# Patient Record
Sex: MALE | Race: WHITE | NOT HISPANIC OR LATINO
[De-identification: names, ages, dates, MRNs, and addresses within clinical notes are randomized per-mention and may not be internally consistent; named-entity substitution may affect disease eponyms.]

---

## 2017-01-20 ENCOUNTER — APPOINTMENT (OUTPATIENT)
Dept: UROLOGY | Facility: CLINIC | Age: 82
End: 2017-01-20

## 2017-07-21 ENCOUNTER — APPOINTMENT (OUTPATIENT)
Dept: UROLOGY | Facility: CLINIC | Age: 82
End: 2017-07-21

## 2017-07-26 LAB
APPEARANCE: CLEAR
BACTERIA UR CULT: NORMAL
BACTERIA: ABNORMAL
BILIRUBIN URINE: NEGATIVE
BLOOD URINE: ABNORMAL
CALCIUM OXALATE CRYSTALS: ABNORMAL
COLOR: ABNORMAL
GLUCOSE QUALITATIVE U: NORMAL MG/DL
HYALINE CASTS: 0 /LPF
KETONES URINE: NEGATIVE
LEUKOCYTE ESTERASE URINE: ABNORMAL
MICROSCOPIC-UA: NORMAL
NITRITE URINE: NEGATIVE
PH URINE: 5.5
PROTEIN URINE: 30 MG/DL
RED BLOOD CELLS URINE: 25 /HPF
SPECIFIC GRAVITY URINE: 1.02
SQUAMOUS EPITHELIAL CELLS: 2 /HPF
UROBILINOGEN URINE: 1 MG/DL
WHITE BLOOD CELLS URINE: 30 /HPF

## 2018-01-19 ENCOUNTER — APPOINTMENT (OUTPATIENT)
Dept: UROLOGY | Facility: CLINIC | Age: 83
End: 2018-01-19
Payer: MEDICARE

## 2018-01-19 PROCEDURE — 99214 OFFICE O/P EST MOD 30 MIN: CPT

## 2018-01-29 ENCOUNTER — APPOINTMENT (OUTPATIENT)
Dept: UROLOGY | Facility: CLINIC | Age: 83
End: 2018-01-29

## 2018-02-12 ENCOUNTER — APPOINTMENT (OUTPATIENT)
Dept: UROLOGY | Facility: CLINIC | Age: 83
End: 2018-02-12
Payer: MEDICARE

## 2018-02-12 ENCOUNTER — OUTPATIENT (OUTPATIENT)
Dept: OUTPATIENT SERVICES | Facility: HOSPITAL | Age: 83
LOS: 1 days | End: 2018-02-12
Payer: MEDICARE

## 2018-02-12 DIAGNOSIS — K46.9 UNSPECIFIED ABDOMINAL HERNIA WITHOUT OBSTRUCTION OR GANGRENE: Chronic | ICD-10-CM

## 2018-02-12 DIAGNOSIS — Z95.810 PRESENCE OF AUTOMATIC (IMPLANTABLE) CARDIAC DEFIBRILLATOR: Chronic | ICD-10-CM

## 2018-02-12 DIAGNOSIS — R97.20 ELEVATED PROSTATE, SPECIFIC ANTIGEN [PSA]: ICD-10-CM

## 2018-02-12 DIAGNOSIS — R35.0 FREQUENCY OF MICTURITION: ICD-10-CM

## 2018-02-12 DIAGNOSIS — Z98.89 OTHER SPECIFIED POSTPROCEDURAL STATES: Chronic | ICD-10-CM

## 2018-02-12 PROCEDURE — 76872 US TRANSRECTAL: CPT

## 2018-02-12 PROCEDURE — 76872 US TRANSRECTAL: CPT | Mod: 26

## 2018-02-12 PROCEDURE — 99213 OFFICE O/P EST LOW 20 MIN: CPT | Mod: 25

## 2018-02-16 DIAGNOSIS — N40.1 BENIGN PROSTATIC HYPERPLASIA WITH LOWER URINARY TRACT SYMPTOMS: ICD-10-CM

## 2018-02-16 DIAGNOSIS — R97.20 ELEVATED PROSTATE SPECIFIC ANTIGEN [PSA]: ICD-10-CM

## 2019-02-25 ENCOUNTER — APPOINTMENT (OUTPATIENT)
Dept: UROLOGY | Facility: CLINIC | Age: 84
End: 2019-02-25

## 2019-05-28 ENCOUNTER — TRANSCRIPTION ENCOUNTER (OUTPATIENT)
Age: 84
End: 2019-05-28

## 2019-05-31 ENCOUNTER — APPOINTMENT (OUTPATIENT)
Dept: UROLOGY | Facility: CLINIC | Age: 84
End: 2019-05-31
Payer: MEDICARE

## 2019-05-31 VITALS
HEART RATE: 88 BPM | OXYGEN SATURATION: 98 % | TEMPERATURE: 97.7 F | WEIGHT: 144 LBS | HEIGHT: 70 IN | BODY MASS INDEX: 20.62 KG/M2 | SYSTOLIC BLOOD PRESSURE: 125 MMHG | DIASTOLIC BLOOD PRESSURE: 78 MMHG

## 2019-05-31 PROCEDURE — 99213 OFFICE O/P EST LOW 20 MIN: CPT

## 2019-05-31 NOTE — HISTORY OF PRESENT ILLNESS
[FreeTextEntry1] : 83yo gentleman with cc of elevated PSA and hematuria. Pt with hx of TURP x3, last in 2016.  At baseline, he reports some spraying of urinary stream. Nocturia 2x on average. No urinary incontinence. No feelings of incomplete emptying but he has feelings of needing to go soon after.  No longer on any BPH meds. No significant bother. No issues with UTIs. Pt on eliquis for afib. He was previously on coumadin. He has had issues in the past with gross hematuria. He was switched to eliquis to aid in decreasing risk of hematuria. Minimal hematuria since switch to eliquis--none since July 2017. Never any sequelae. Several days ago he developed change in urine color with orange tone. He passed a clot. Has been clear since then. Has been taking eliquis throughout. UA showed only 3-10 RBC. UCx pending but appears negative. \par \par He has noted a slow increase in nocturia. A year ago he was getting up 2-3 times. Now its more (3-4). \par \par He recently went to see PCP and PSA was checked and noted to be elevated at 6.4. Pt remembers it last being 4.9 in 2015. Sisters son with prostate ca dx 62yo and tx with RT. Plan was made for eval with prostate sizing to calculate density and further stratify risk. Prostate today measured as 74cc. This corresponds to a density of 0.09. This is very reassuring that increased PSA is a function of increased prostate volume. \par \par Pt reports that L groin pain is still present. Pain is dull and constant. No radiation. No bulge. Pain is exacerbated by voiding. No clear alleviating factors. No nausea/vomiting. No fevers or chills. No dysuria. No increased urinary frequency or urgency.

## 2019-05-31 NOTE — ASSESSMENT
[FreeTextEntry1] : Gross hematuria (micro on urine) now resolved. Has had work-up multiple times prior. \par --F/u UCx\par --If recurs, CTU and cysto\par \par Nocturia. \par --Trial of flomax

## 2019-12-19 ENCOUNTER — APPOINTMENT (OUTPATIENT)
Dept: PULMONOLOGY | Facility: CLINIC | Age: 84
End: 2019-12-19
Payer: MEDICARE

## 2019-12-19 VITALS
BODY MASS INDEX: 21.92 KG/M2 | OXYGEN SATURATION: 96 % | WEIGHT: 148 LBS | HEART RATE: 88 BPM | RESPIRATION RATE: 16 BRPM | HEIGHT: 69 IN | SYSTOLIC BLOOD PRESSURE: 114 MMHG | DIASTOLIC BLOOD PRESSURE: 72 MMHG

## 2019-12-19 LAB — POCT - HEMOGLOBIN (HGB), QUANTITATIVE, TRANSCUTANEOUS: 10.8

## 2019-12-19 PROCEDURE — 94727 GAS DIL/WSHOT DETER LNG VOL: CPT

## 2019-12-19 PROCEDURE — 94060 EVALUATION OF WHEEZING: CPT

## 2019-12-19 PROCEDURE — 94729 DIFFUSING CAPACITY: CPT

## 2019-12-19 PROCEDURE — 88738 HGB QUANT TRANSCUTANEOUS: CPT

## 2019-12-19 PROCEDURE — 71046 X-RAY EXAM CHEST 2 VIEWS: CPT

## 2019-12-19 PROCEDURE — 99214 OFFICE O/P EST MOD 30 MIN: CPT | Mod: 25

## 2019-12-19 RX ORDER — TAMSULOSIN HYDROCHLORIDE 0.4 MG/1
0.4 CAPSULE ORAL
Qty: 30 | Refills: 11 | Status: DISCONTINUED | COMMUNITY
Start: 2019-05-31 | End: 2019-12-19

## 2019-12-19 NOTE — ASSESSMENT
[FreeTextEntry1] : Patient qualifies for oxygen based on oxygen desaturation with exertion relieved with supplementation.\par \par

## 2019-12-19 NOTE — PHYSICAL EXAM
[General Appearance - Well Developed] : well developed [Normal Appearance] : normal appearance [General Appearance - Well Nourished] : well nourished [Well Groomed] : well groomed [No Deformities] : no deformities [General Appearance - In No Acute Distress] : no acute distress [Normal Conjunctiva] : the conjunctiva exhibited no abnormalities [Eyelids - No Xanthelasma] : the eyelids demonstrated no xanthelasmas [Normal Oropharynx] : normal oropharynx [Low Lying Soft Palate] : low lying soft palate [Elongated Uvula] : elongated uvula [Neck Appearance] : the appearance of the neck was normal [Neck Cervical Mass (___cm)] : no neck mass was observed [Thyroid Diffuse Enlargement] : the thyroid was not enlarged [Thyroid Nodule] : there were no palpable thyroid nodules [FreeTextEntry1] : Reduced aeration bilaterally [Abdomen Soft] : soft [Abdomen Tenderness] : non-tender [Abdomen Mass (___ Cm)] : no abdominal mass palpated [Abnormal Walk] : normal gait [Gait - Sufficient For Exercise Testing] : the gait was sufficient for exercise testing [Nail Clubbing] : no clubbing of the fingernails [Cyanosis, Localized] : no localized cyanosis [Petechial Hemorrhages (___cm)] : no petechial hemorrhages [Skin Color & Pigmentation] : normal skin color and pigmentation [] : no rash [No Venous Stasis] : no venous stasis [Skin Lesions] : no skin lesions [No Skin Ulcers] : no skin ulcer [No Xanthoma] : no  xanthoma was observed [Deep Tendon Reflexes (DTR)] : deep tendon reflexes were 2+ and symmetric [Sensation] : the sensory exam was normal to light touch and pinprick [No Focal Deficits] : no focal deficits [Oriented To Time, Place, And Person] : oriented to person, place, and time [Impaired Insight] : insight and judgment were intact [Affect] : the affect was normal

## 2019-12-19 NOTE — HISTORY OF PRESENT ILLNESS
[FreeTextEntry1] : Followup for CHF\par Valvular heart disease\par COPD\par Uses oxygen at night, does not have portable oxygen\par Some shortness of breath with exertion

## 2019-12-19 NOTE — PROCEDURE
[FreeTextEntry1] : PFTs demonstrate moderate combined obstruction and restriction with interval decline\par Walking oximetry demonstrates oxygen desaturation with exertion to 85%\par With 3 L of nasal oxygen, oxygen saturation was maintained with exertion over 90%

## 2020-10-23 ENCOUNTER — APPOINTMENT (OUTPATIENT)
Dept: UROLOGY | Facility: CLINIC | Age: 85
End: 2020-10-23
Payer: MEDICARE

## 2020-10-23 VITALS
BODY MASS INDEX: 21.62 KG/M2 | WEIGHT: 146 LBS | DIASTOLIC BLOOD PRESSURE: 88 MMHG | SYSTOLIC BLOOD PRESSURE: 148 MMHG | HEART RATE: 92 BPM | HEIGHT: 69 IN

## 2020-10-23 VITALS — TEMPERATURE: 97.9 F

## 2020-10-23 PROCEDURE — 99213 OFFICE O/P EST LOW 20 MIN: CPT

## 2020-10-23 PROCEDURE — 99072 ADDL SUPL MATRL&STAF TM PHE: CPT

## 2020-10-23 RX ORDER — MONTELUKAST SODIUM 10 MG/1
TABLET, FILM COATED ORAL
Refills: 0 | Status: ACTIVE | COMMUNITY

## 2020-10-23 RX ORDER — OMEPRAZOLE 40 MG/1
40 CAPSULE, DELAYED RELEASE ORAL
Qty: 30 | Refills: 0 | Status: DISCONTINUED | COMMUNITY
Start: 2017-11-07 | End: 2020-10-23

## 2020-10-23 NOTE — ASSESSMENT
[FreeTextEntry1] : Gross hematuria in the past. None recently. has known BPH. No recent episode of hematuria. \par --RTC in 1y\par

## 2020-10-23 NOTE — HISTORY OF PRESENT ILLNESS
[FreeTextEntry1] : 84yo gentleman with cc of elevated PSA and hematuria. Pt with hx of TURP x3, last in 2016.  At baseline, he reports some spraying of urinary stream. Nocturia 2x on average. No urinary incontinence. No feelings of incomplete emptying but he has feelings of needing to go soon after.  No longer on any BPH meds. No significant bother. No issues with UTIs. Pt on eliquis for afib. He was previously on coumadin. He has had issues in the past with gross hematuria. He was switched to eliquis to aid in decreasing risk of hematuria. Minimal hematuria since switch to eliquis--none since July 2017. Had one bleeding episode since that change last year. None since last visit. \par \par He has noted a slow increase in nocturia. A year ago he was getting up 2-3 times. Now its more (3-4) but has been this way for the last year. He was trialed on meds with tamsulosin but did not feel it helped. \par \par He recently went to see PCP and PSA was checked and noted to be elevated at 6.4. Pt remembers it last being 4.9 in 2015. Sisters son with prostate ca dx 64yo and tx with RT. Plan was made for eval with prostate sizing to calculate density and further stratify risk. Prostate last measured as 74cc. This corresponds to a density of 0.09. This is very reassuring that increased PSA is a function of increased prostate volume.

## 2020-12-22 ENCOUNTER — APPOINTMENT (OUTPATIENT)
Dept: PULMONOLOGY | Facility: CLINIC | Age: 85
End: 2020-12-22
Payer: MEDICARE

## 2020-12-22 VITALS
TEMPERATURE: 98 F | HEIGHT: 69 IN | BODY MASS INDEX: 22.07 KG/M2 | SYSTOLIC BLOOD PRESSURE: 143 MMHG | DIASTOLIC BLOOD PRESSURE: 78 MMHG | WEIGHT: 149 LBS | OXYGEN SATURATION: 96 % | HEART RATE: 105 BPM

## 2020-12-22 PROCEDURE — 99213 OFFICE O/P EST LOW 20 MIN: CPT

## 2020-12-22 PROCEDURE — 99072 ADDL SUPL MATRL&STAF TM PHE: CPT

## 2020-12-23 NOTE — HISTORY OF PRESENT ILLNESS
[FreeTextEntry1] : Followup for CHF\par Valvular heart disease\par COPD\par Uses oxygen at night, does not have portable oxygen\par

## 2020-12-23 NOTE — PHYSICAL EXAM
[TextBox_11] : baggy eyelids [General Appearance - Well Developed] : well developed [Normal Appearance] : normal appearance [Well Groomed] : well groomed [General Appearance - Well Nourished] : well nourished [No Deformities] : no deformities [General Appearance - In No Acute Distress] : no acute distress [Normal Conjunctiva] : the conjunctiva exhibited no abnormalities [Eyelids - No Xanthelasma] : the eyelids demonstrated no xanthelasmas [Normal Oropharynx] : normal oropharynx [Low Lying Soft Palate] : low lying soft palate [Elongated Uvula] : elongated uvula [Neck Appearance] : the appearance of the neck was normal [Neck Cervical Mass (___cm)] : no neck mass was observed [Thyroid Diffuse Enlargement] : the thyroid was not enlarged [Thyroid Nodule] : there were no palpable thyroid nodules [FreeTextEntry1] : Reduced aeration bilaterally [Abdomen Soft] : soft [Abdomen Tenderness] : non-tender [Abdomen Mass (___ Cm)] : no abdominal mass palpated [Abnormal Walk] : normal gait [Gait - Sufficient For Exercise Testing] : the gait was sufficient for exercise testing [Nail Clubbing] : no clubbing of the fingernails [Cyanosis, Localized] : no localized cyanosis [Petechial Hemorrhages (___cm)] : no petechial hemorrhages [Skin Color & Pigmentation] : normal skin color and pigmentation [] : no rash [No Venous Stasis] : no venous stasis [Skin Lesions] : no skin lesions [No Skin Ulcers] : no skin ulcer [No Xanthoma] : no  xanthoma was observed [Deep Tendon Reflexes (DTR)] : deep tendon reflexes were 2+ and symmetric [Sensation] : the sensory exam was normal to light touch and pinprick [No Focal Deficits] : no focal deficits [Oriented To Time, Place, And Person] : oriented to person, place, and time [Impaired Insight] : insight and judgment were intact [Affect] : the affect was normal

## 2021-01-29 ENCOUNTER — APPOINTMENT (OUTPATIENT)
Dept: PULMONOLOGY | Facility: CLINIC | Age: 86
End: 2021-01-29
Payer: MEDICARE

## 2021-01-29 VITALS
DIASTOLIC BLOOD PRESSURE: 68 MMHG | TEMPERATURE: 98.5 F | SYSTOLIC BLOOD PRESSURE: 108 MMHG | OXYGEN SATURATION: 94 % | HEART RATE: 91 BPM

## 2021-01-29 PROCEDURE — 99072 ADDL SUPL MATRL&STAF TM PHE: CPT

## 2021-01-29 PROCEDURE — 99213 OFFICE O/P EST LOW 20 MIN: CPT | Mod: 25

## 2021-01-29 PROCEDURE — 94618 PULMONARY STRESS TESTING: CPT

## 2021-01-30 NOTE — ASSESSMENT
[FreeTextEntry1] : Patient qualifies with continued oxygen supplementation for diagnosis of congestive heart failure and oxygen desaturation with exertion.\par \par

## 2021-01-30 NOTE — PHYSICAL EXAM
[TextBox_11] : baggy eyelids [General Appearance - Well Developed] : well developed [Normal Appearance] : normal appearance [Well Groomed] : well groomed [General Appearance - Well Nourished] : well nourished [No Deformities] : no deformities [General Appearance - In No Acute Distress] : no acute distress [Normal Conjunctiva] : the conjunctiva exhibited no abnormalities [Eyelids - No Xanthelasma] : the eyelids demonstrated no xanthelasmas [Normal Oropharynx] : normal oropharynx [Low Lying Soft Palate] : low lying soft palate [Elongated Uvula] : elongated uvula [Neck Appearance] : the appearance of the neck was normal [Thyroid Diffuse Enlargement] : the thyroid was not enlarged [Neck Cervical Mass (___cm)] : no neck mass was observed [Thyroid Nodule] : there were no palpable thyroid nodules [FreeTextEntry1] : Reduced aeration bilaterally [Abdomen Soft] : soft [Abdomen Tenderness] : non-tender [Abdomen Mass (___ Cm)] : no abdominal mass palpated [Gait - Sufficient For Exercise Testing] : the gait was sufficient for exercise testing [Abnormal Walk] : normal gait [Nail Clubbing] : no clubbing of the fingernails [Cyanosis, Localized] : no localized cyanosis [Petechial Hemorrhages (___cm)] : no petechial hemorrhages [] : no rash [Skin Color & Pigmentation] : normal skin color and pigmentation [No Venous Stasis] : no venous stasis [Skin Lesions] : no skin lesions [No Skin Ulcers] : no skin ulcer [No Xanthoma] : no  xanthoma was observed [Deep Tendon Reflexes (DTR)] : deep tendon reflexes were 2+ and symmetric [Sensation] : the sensory exam was normal to light touch and pinprick [No Focal Deficits] : no focal deficits [Oriented To Time, Place, And Person] : oriented to person, place, and time [Impaired Insight] : insight and judgment were intact [Affect] : the affect was normal

## 2021-01-30 NOTE — HISTORY OF PRESENT ILLNESS
[FreeTextEntry1] : Followup for CHF\par Valvular heart disease\par COPD\par Uses oxygen at night\par He reports that he has a portable oxygen system although at the last visit he said that he did not and he needs 1.  We had attempted to do overnight oximetry but was an unsuccessful study.\par

## 2021-01-30 NOTE — PROCEDURE
[FreeTextEntry1] : Walking pulse oximetry:.  Baseline saturation 93% decreased to 87% with 6 minutes of exertion.\par \par Walking oximetry with supplemental oxygen baseline 93% oxygen saturation maintained above 90% with exertion for 6 minutes.

## 2021-07-16 ENCOUNTER — APPOINTMENT (OUTPATIENT)
Dept: PULMONOLOGY | Facility: CLINIC | Age: 86
End: 2021-07-16
Payer: MEDICARE

## 2021-07-16 VITALS
DIASTOLIC BLOOD PRESSURE: 73 MMHG | SYSTOLIC BLOOD PRESSURE: 112 MMHG | OXYGEN SATURATION: 95 % | TEMPERATURE: 98.2 F | HEART RATE: 84 BPM

## 2021-07-16 PROCEDURE — 94010 BREATHING CAPACITY TEST: CPT

## 2021-07-16 PROCEDURE — 99213 OFFICE O/P EST LOW 20 MIN: CPT | Mod: 25

## 2021-07-16 RX ORDER — GLYCOPYRROLATE 1 MG/1
1 TABLET ORAL
Refills: 0 | Status: DISCONTINUED | COMMUNITY
End: 2021-07-16

## 2021-07-16 RX ORDER — OMEPRAZOLE 20 MG/1
20 TABLET, DELAYED RELEASE ORAL
Refills: 0 | Status: ACTIVE | COMMUNITY

## 2021-07-16 RX ORDER — APIXABAN 5 MG/1
5 TABLET, FILM COATED ORAL
Refills: 0 | Status: ACTIVE | COMMUNITY

## 2021-07-16 RX ORDER — LORATADINE 10 MG/1
10 CAPSULE, LIQUID FILLED ORAL
Refills: 0 | Status: ACTIVE | COMMUNITY

## 2021-07-16 RX ORDER — GLYCOPYRROLATE 1 MG/1
1 TABLET ORAL 3 TIMES DAILY
Qty: 90 | Refills: 5 | Status: DISCONTINUED | COMMUNITY
Start: 2021-03-22 | End: 2021-07-16

## 2021-07-17 NOTE — ASSESSMENT
[FreeTextEntry1] : Overall stable\par \par Trial of inhaler-Trelegy\par Proper use and technique for inhaler demonstrated and explained.\par \par \par \par

## 2021-07-17 NOTE — HISTORY OF PRESENT ILLNESS
[FreeTextEntry1] : Followup for CHF\par Valvular heart disease\par COPD\par Uses oxygen at night\par chronic dyspnea\par

## 2021-11-11 ENCOUNTER — APPOINTMENT (OUTPATIENT)
Dept: UROLOGY | Facility: CLINIC | Age: 86
End: 2021-11-11

## 2021-12-17 ENCOUNTER — APPOINTMENT (OUTPATIENT)
Dept: UROLOGY | Facility: CLINIC | Age: 86
End: 2021-12-17
Payer: MEDICARE

## 2021-12-17 DIAGNOSIS — R35.1 NOCTURIA: ICD-10-CM

## 2021-12-17 PROCEDURE — 99214 OFFICE O/P EST MOD 30 MIN: CPT

## 2021-12-20 LAB
APPEARANCE: CLEAR
BACTERIA UR CULT: NORMAL
BACTERIA: NEGATIVE
BILIRUBIN URINE: NEGATIVE
BLOOD URINE: NEGATIVE
COLOR: YELLOW
GLUCOSE QUALITATIVE U: NEGATIVE
HYALINE CASTS: 0 /LPF
KETONES URINE: NEGATIVE
LEUKOCYTE ESTERASE URINE: NEGATIVE
MICROSCOPIC-UA: NORMAL
NITRITE URINE: NEGATIVE
PH URINE: 6.5
PROTEIN URINE: ABNORMAL
RED BLOOD CELLS URINE: 8 /HPF
SPECIFIC GRAVITY URINE: 1.01
SQUAMOUS EPITHELIAL CELLS: 0 /HPF
UROBILINOGEN URINE: NORMAL
WHITE BLOOD CELLS URINE: 1 /HPF

## 2021-12-20 NOTE — PHYSICAL EXAM
[General Appearance - Well Developed] : well developed [General Appearance - Well Nourished] : well nourished [Normal Appearance] : normal appearance [Well Groomed] : well groomed [General Appearance - In No Acute Distress] : no acute distress [Abdomen Soft] : soft [Abdomen Tenderness] : non-tender [Edema] : no peripheral edema [] : no respiratory distress [Respiration, Rhythm And Depth] : normal respiratory rhythm and effort [Exaggerated Use Of Accessory Muscles For Inspiration] : no accessory muscle use [Oriented To Time, Place, And Person] : oriented to person, place, and time [Affect] : the affect was normal [Mood] : the mood was normal [Not Anxious] : not anxious [No Focal Deficits] : no focal deficits [Urinary Bladder Findings] : the bladder was normal on palpation

## 2021-12-20 NOTE — HISTORY OF PRESENT ILLNESS
[FreeTextEntry1] : 87 yo gentleman with cc of elevated PSA and hematuria. Pt with hx of TURP x3, last in 2016. At baseline, he reports some spraying of urinary stream. Nocturia 2x on average. No urinary incontinence. No feelings of incomplete emptying but he has feelings of needing to go soon after. No longer on any BPH meds. No significant bother. No issues with UTIs. Pt on eliquis for afib. He was previously on coumadin. He has had issues in the past with gross hematuria. He was switched to eliquis to aid in decreasing risk of hematuria. Minimal hematuria since switch to eliquis--none since July 2017. Had one bleeding episode since that change last year. None since last visit. \par \par He has noted a slow increase in nocturia. A year ago he was getting up 2-3 times. Now its more (3-4) but has been this way for the last year. He was trialed on meds with tamsulosin but did not feel it helped. \par \par In 2019 pt went to see PCP and PSA was checked and noted to be elevated at 6.4. Pt remembers it last being 4.9 in 2015. Sisters son with prostate ca dx 62yo and tx with RT. Plan was made for eval with prostate sizing to calculate density and further stratify risk. Prostate last measured as 74cc. This corresponds to a density of 0.09. This is very reassuring that increased PSA is a function of increased prostate volume. \par \par Today (12/17/2021), patient returns to clinic for his yearly follow up. He states that his symptoms of urinary frequency have remained the same and that he goes about once every hour. He wakes up 3-4x a night which is unchanged from last year. Mr. Morse complains that his stream is weak at times but that it is not bothersome to him. He strains for about 5-10 seconds before urination starts. In October, he had one episode of hematuria (red urine with no clots per patient) and dropped off a urine sample the day after. Urine culture was negative at the time but U/A was not performed. He has had hematuria in the past, but the last episode was many years ago. He has tried tamsulosin in the past and states that it did not help him. He is amenable to trying something else to help his symptoms. \par \par PVR was 15 cc. \par

## 2021-12-20 NOTE — ASSESSMENT
[FreeTextEntry1] : BPH s/p TURP x3 (with frequency, nocturia, straining and weak stream)\par - U/A, Urine Culture\par - Trial of alfuzosin  --> discussed risks and benefits \par - Return to clinic in 1 month to discuss if alfuzosin helps improve symptoms\par \par Gross Hematuria\par - U/A, Urine Culture, and Urine Cytology\par - Due to fact that patient has not had an episode of gross hematuria in many years, join decision making was utilized to decide to work up. \par - Recommend CTU but pt reports high cost. Agreed to renal U/S eval instead. disucssed that this is less sensitive\par - Cystoscopy in 1 month

## 2021-12-22 LAB — URINE CYTOLOGY: NORMAL

## 2022-01-01 ENCOUNTER — APPOINTMENT (OUTPATIENT)
Dept: GASTROENTEROLOGY | Facility: CLINIC | Age: 87
End: 2022-01-01

## 2022-01-01 ENCOUNTER — APPOINTMENT (OUTPATIENT)
Dept: UROLOGY | Facility: CLINIC | Age: 87
End: 2022-01-01

## 2022-01-01 ENCOUNTER — NON-APPOINTMENT (OUTPATIENT)
Age: 87
End: 2022-01-01

## 2022-01-01 ENCOUNTER — APPOINTMENT (OUTPATIENT)
Dept: PULMONOLOGY | Facility: CLINIC | Age: 87
End: 2022-01-01

## 2022-01-01 ENCOUNTER — APPOINTMENT (OUTPATIENT)
Dept: UROLOGY | Facility: CLINIC | Age: 87
End: 2022-01-01
Payer: MEDICARE

## 2022-01-01 ENCOUNTER — TRANSCRIPTION ENCOUNTER (OUTPATIENT)
Age: 87
End: 2022-01-01

## 2022-01-01 ENCOUNTER — APPOINTMENT (OUTPATIENT)
Dept: PULMONOLOGY | Facility: CLINIC | Age: 87
End: 2022-01-01
Payer: MEDICARE

## 2022-01-01 ENCOUNTER — INPATIENT (INPATIENT)
Facility: HOSPITAL | Age: 87
LOS: 4 days | Discharge: NURSING, REHAB OR LONG-TERM CARE | End: 2022-12-22
Attending: INTERNAL MEDICINE | Admitting: INTERNAL MEDICINE
Payer: MEDICARE

## 2022-01-01 ENCOUNTER — INPATIENT (INPATIENT)
Facility: HOSPITAL | Age: 87
LOS: 5 days | Discharge: HOME | End: 2022-12-08
Attending: INTERNAL MEDICINE | Admitting: INTERNAL MEDICINE

## 2022-01-01 VITALS
SYSTOLIC BLOOD PRESSURE: 103 MMHG | OXYGEN SATURATION: 92 % | HEIGHT: 69 IN | WEIGHT: 138 LBS | BODY MASS INDEX: 20.44 KG/M2 | HEART RATE: 88 BPM | TEMPERATURE: 98 F | DIASTOLIC BLOOD PRESSURE: 58 MMHG

## 2022-01-01 VITALS
HEIGHT: 69 IN | HEART RATE: 91 BPM | DIASTOLIC BLOOD PRESSURE: 60 MMHG | WEIGHT: 135.13 LBS | TEMPERATURE: 97.3 F | SYSTOLIC BLOOD PRESSURE: 110 MMHG | OXYGEN SATURATION: 99 % | BODY MASS INDEX: 20.01 KG/M2

## 2022-01-01 VITALS
OXYGEN SATURATION: 91 % | DIASTOLIC BLOOD PRESSURE: 57 MMHG | TEMPERATURE: 99 F | HEART RATE: 81 BPM | RESPIRATION RATE: 18 BRPM | SYSTOLIC BLOOD PRESSURE: 106 MMHG

## 2022-01-01 VITALS
TEMPERATURE: 103 F | DIASTOLIC BLOOD PRESSURE: 78 MMHG | RESPIRATION RATE: 22 BRPM | HEIGHT: 69 IN | HEART RATE: 88 BPM | WEIGHT: 199.96 LBS | OXYGEN SATURATION: 100 % | SYSTOLIC BLOOD PRESSURE: 127 MMHG

## 2022-01-01 VITALS
SYSTOLIC BLOOD PRESSURE: 110 MMHG | TEMPERATURE: 98 F | HEART RATE: 82 BPM | DIASTOLIC BLOOD PRESSURE: 58 MMHG | OXYGEN SATURATION: 99 % | RESPIRATION RATE: 18 BRPM

## 2022-01-01 VITALS
OXYGEN SATURATION: 98 % | TEMPERATURE: 97 F | HEART RATE: 80 BPM | DIASTOLIC BLOOD PRESSURE: 61 MMHG | RESPIRATION RATE: 18 BRPM | SYSTOLIC BLOOD PRESSURE: 99 MMHG

## 2022-01-01 VITALS
OXYGEN SATURATION: 94 % | HEIGHT: 69 IN | WEIGHT: 146 LBS | DIASTOLIC BLOOD PRESSURE: 62 MMHG | BODY MASS INDEX: 21.62 KG/M2 | SYSTOLIC BLOOD PRESSURE: 100 MMHG | HEART RATE: 88 BPM | TEMPERATURE: 98.3 F

## 2022-01-01 VITALS
SYSTOLIC BLOOD PRESSURE: 110 MMHG | TEMPERATURE: 97.2 F | BODY MASS INDEX: 20.44 KG/M2 | WEIGHT: 138 LBS | HEIGHT: 69 IN | DIASTOLIC BLOOD PRESSURE: 60 MMHG

## 2022-01-01 VITALS
HEART RATE: 81 BPM | OXYGEN SATURATION: 100 % | TEMPERATURE: 97.4 F | SYSTOLIC BLOOD PRESSURE: 108 MMHG | DIASTOLIC BLOOD PRESSURE: 72 MMHG

## 2022-01-01 DIAGNOSIS — E43 UNSPECIFIED SEVERE PROTEIN-CALORIE MALNUTRITION: ICD-10-CM

## 2022-01-01 DIAGNOSIS — I47.20 VENTRICULAR TACHYCARDIA, UNSPECIFIED: ICD-10-CM

## 2022-01-01 DIAGNOSIS — Z90.79 ACQUIRED ABSENCE OF OTHER GENITAL ORGAN(S): ICD-10-CM

## 2022-01-01 DIAGNOSIS — Z87.891 PERSONAL HISTORY OF NICOTINE DEPENDENCE: ICD-10-CM

## 2022-01-01 DIAGNOSIS — Z95.810 PRESENCE OF AUTOMATIC (IMPLANTABLE) CARDIAC DEFIBRILLATOR: ICD-10-CM

## 2022-01-01 DIAGNOSIS — K46.9 UNSPECIFIED ABDOMINAL HERNIA WITHOUT OBSTRUCTION OR GANGRENE: Chronic | ICD-10-CM

## 2022-01-01 DIAGNOSIS — I27.20 PULMONARY HYPERTENSION, UNSPECIFIED: ICD-10-CM

## 2022-01-01 DIAGNOSIS — Z98.89 OTHER SPECIFIED POSTPROCEDURAL STATES: Chronic | ICD-10-CM

## 2022-01-01 DIAGNOSIS — N39.0 URINARY TRACT INFECTION, SITE NOT SPECIFIED: ICD-10-CM

## 2022-01-01 DIAGNOSIS — Z95.810 PRESENCE OF AUTOMATIC (IMPLANTABLE) CARDIAC DEFIBRILLATOR: Chronic | ICD-10-CM

## 2022-01-01 DIAGNOSIS — E78.5 HYPERLIPIDEMIA, UNSPECIFIED: ICD-10-CM

## 2022-01-01 DIAGNOSIS — D64.9 ANEMIA, UNSPECIFIED: ICD-10-CM

## 2022-01-01 DIAGNOSIS — K21.9 GASTRO-ESOPHAGEAL REFLUX DISEASE WITHOUT ESOPHAGITIS: ICD-10-CM

## 2022-01-01 DIAGNOSIS — Z87.438 PERSONAL HISTORY OF OTHER DISEASES OF MALE GENITAL ORGANS: ICD-10-CM

## 2022-01-01 DIAGNOSIS — J96.01 ACUTE RESPIRATORY FAILURE WITH HYPOXIA: ICD-10-CM

## 2022-01-01 DIAGNOSIS — N40.0 BENIGN PROSTATIC HYPERPLASIA WITHOUT LOWER URINARY TRACT SYMPTOMS: ICD-10-CM

## 2022-01-01 DIAGNOSIS — Z82.49 FAMILY HISTORY OF ISCHEMIC HEART DISEASE AND OTHER DISEASES OF THE CIRCULATORY SYSTEM: ICD-10-CM

## 2022-01-01 DIAGNOSIS — Z86.16 PERSONAL HISTORY OF COVID-19: ICD-10-CM

## 2022-01-01 DIAGNOSIS — K63.5 POLYP OF COLON: ICD-10-CM

## 2022-01-01 DIAGNOSIS — R63.8 OTHER SYMPTOMS AND SIGNS CONCERNING FOOD AND FLUID INTAKE: ICD-10-CM

## 2022-01-01 DIAGNOSIS — R10.32 LEFT LOWER QUADRANT PAIN: ICD-10-CM

## 2022-01-01 DIAGNOSIS — I42.9 CARDIOMYOPATHY, UNSPECIFIED: ICD-10-CM

## 2022-01-01 DIAGNOSIS — R31.29 OTHER MICROSCOPIC HEMATURIA: ICD-10-CM

## 2022-01-01 DIAGNOSIS — I50.23 ACUTE ON CHRONIC SYSTOLIC (CONGESTIVE) HEART FAILURE: ICD-10-CM

## 2022-01-01 DIAGNOSIS — R33.9 RETENTION OF URINE, UNSPECIFIED: ICD-10-CM

## 2022-01-01 DIAGNOSIS — G47.33 OBSTRUCTIVE SLEEP APNEA (ADULT) (PEDIATRIC): ICD-10-CM

## 2022-01-01 DIAGNOSIS — I35.1 NONRHEUMATIC AORTIC (VALVE) INSUFFICIENCY: ICD-10-CM

## 2022-01-01 DIAGNOSIS — Z91.018 ALLERGY TO OTHER FOODS: ICD-10-CM

## 2022-01-01 DIAGNOSIS — I50.9 HEART FAILURE, UNSPECIFIED: ICD-10-CM

## 2022-01-01 DIAGNOSIS — I34.0 NONRHEUMATIC MITRAL (VALVE) INSUFFICIENCY: ICD-10-CM

## 2022-01-01 DIAGNOSIS — I71.40 ABDOMINAL AORTIC ANEURYSM, WITHOUT RUPTURE, UNSPECIFIED: ICD-10-CM

## 2022-01-01 DIAGNOSIS — K58.9 IRRITABLE BOWEL SYNDROME W/OUT DIARRHEA: ICD-10-CM

## 2022-01-01 DIAGNOSIS — I11.0 HYPERTENSIVE HEART DISEASE WITH HEART FAILURE: ICD-10-CM

## 2022-01-01 DIAGNOSIS — Z88.5 ALLERGY STATUS TO NARCOTIC AGENT: ICD-10-CM

## 2022-01-01 DIAGNOSIS — J44.9 CHRONIC OBSTRUCTIVE PULMONARY DISEASE, UNSPECIFIED: ICD-10-CM

## 2022-01-01 DIAGNOSIS — A49.1 STREPTOCOCCAL INFECTION, UNSPECIFIED SITE: ICD-10-CM

## 2022-01-01 DIAGNOSIS — N17.9 ACUTE KIDNEY FAILURE, UNSPECIFIED: ICD-10-CM

## 2022-01-01 DIAGNOSIS — Z98.890 OTHER SPECIFIED POSTPROCEDURAL STATES: ICD-10-CM

## 2022-01-01 DIAGNOSIS — U07.1 COVID-19: ICD-10-CM

## 2022-01-01 DIAGNOSIS — Z79.899 OTHER LONG TERM (CURRENT) DRUG THERAPY: ICD-10-CM

## 2022-01-01 DIAGNOSIS — R31.0 GROSS HEMATURIA: ICD-10-CM

## 2022-01-01 DIAGNOSIS — I48.20 CHRONIC ATRIAL FIBRILLATION, UNSPECIFIED: ICD-10-CM

## 2022-01-01 DIAGNOSIS — K58.9 IRRITABLE BOWEL SYNDROME WITHOUT DIARRHEA: ICD-10-CM

## 2022-01-01 DIAGNOSIS — N40.1 BENIGN PROSTATIC HYPERPLASIA WITH LOWER URINARY TRACT SYMPMS: ICD-10-CM

## 2022-01-01 DIAGNOSIS — Z86.79 PERSONAL HISTORY OF OTHER DISEASES OF THE CIRCULATORY SYSTEM: ICD-10-CM

## 2022-01-01 DIAGNOSIS — A40.9 STREPTOCOCCAL SEPSIS, UNSPECIFIED: ICD-10-CM

## 2022-01-01 DIAGNOSIS — A40.8 OTHER STREPTOCOCCAL SEPSIS: ICD-10-CM

## 2022-01-01 DIAGNOSIS — I50.22 CHRONIC SYSTOLIC (CONGESTIVE) HEART FAILURE: ICD-10-CM

## 2022-01-01 DIAGNOSIS — Z79.01 LONG TERM (CURRENT) USE OF ANTICOAGULANTS: ICD-10-CM

## 2022-01-01 DIAGNOSIS — Z20.822 CONTACT WITH AND (SUSPECTED) EXPOSURE TO COVID-19: ICD-10-CM

## 2022-01-01 DIAGNOSIS — I25.10 ATHEROSCLEROTIC HEART DISEASE OF NATIVE CORONARY ARTERY WITHOUT ANGINA PECTORIS: ICD-10-CM

## 2022-01-01 DIAGNOSIS — R06.02 SHORTNESS OF BREATH: ICD-10-CM

## 2022-01-01 DIAGNOSIS — R65.20 SEVERE SEPSIS WITHOUT SEPTIC SHOCK: ICD-10-CM

## 2022-01-01 LAB
-  AMIKACIN: SIGNIFICANT CHANGE UP
-  AMOXICILLIN/CLAVULANIC ACID: SIGNIFICANT CHANGE UP
-  AMPICILLIN/SULBACTAM: SIGNIFICANT CHANGE UP
-  AMPICILLIN: SIGNIFICANT CHANGE UP
-  AZTREONAM: SIGNIFICANT CHANGE UP
-  CEFAZOLIN: SIGNIFICANT CHANGE UP
-  CEFEPIME: SIGNIFICANT CHANGE UP
-  CEFOXITIN: SIGNIFICANT CHANGE UP
-  CEFTRIAXONE: SIGNIFICANT CHANGE UP
-  CEFTRIAXONE: SIGNIFICANT CHANGE UP
-  CIPROFLOXACIN: SIGNIFICANT CHANGE UP
-  CLINDAMYCIN: SIGNIFICANT CHANGE UP
-  ERTAPENEM: SIGNIFICANT CHANGE UP
-  ERYTHROMYCIN: SIGNIFICANT CHANGE UP
-  GENTAMICIN: SIGNIFICANT CHANGE UP
-  IMIPENEM: SIGNIFICANT CHANGE UP
-  LEVOFLOXACIN: SIGNIFICANT CHANGE UP
-  LEVOFLOXACIN: SIGNIFICANT CHANGE UP
-  MEROPENEM: SIGNIFICANT CHANGE UP
-  NITROFURANTOIN: SIGNIFICANT CHANGE UP
-  PENICILLIN: SIGNIFICANT CHANGE UP
-  PIPERACILLIN/TAZOBACTAM: SIGNIFICANT CHANGE UP
-  STREPTOCOCCUS SP. (NOT GRP A, B OR S PNEUMONIAE): SIGNIFICANT CHANGE UP
-  TOBRAMYCIN: SIGNIFICANT CHANGE UP
-  TRIMETHOPRIM/SULFAMETHOXAZOLE: SIGNIFICANT CHANGE UP
-  VANCOMYCIN: SIGNIFICANT CHANGE UP
24R-OH-CALCIDIOL SERPL-MCNC: 66.7 NG/ML — SIGNIFICANT CHANGE UP (ref 30–80)
24R-OH-CALCIDIOL SERPL-MCNC: 70.2 NG/ML — SIGNIFICANT CHANGE UP (ref 30–80)
ALBUMIN SERPL ELPH-MCNC: 2.9 G/DL — LOW (ref 3.3–5)
ALBUMIN SERPL ELPH-MCNC: 2.9 G/DL — LOW (ref 3.3–5)
ALBUMIN SERPL ELPH-MCNC: 3 G/DL — LOW (ref 3.3–5)
ALBUMIN SERPL ELPH-MCNC: 3 G/DL — LOW (ref 3.3–5)
ALBUMIN SERPL ELPH-MCNC: 3.3 G/DL — SIGNIFICANT CHANGE UP (ref 3.3–5)
ALBUMIN SERPL ELPH-MCNC: 3.3 G/DL — SIGNIFICANT CHANGE UP (ref 3.3–5)
ALBUMIN SERPL ELPH-MCNC: 3.6 G/DL — SIGNIFICANT CHANGE UP (ref 3.3–5)
ALBUMIN SERPL ELPH-MCNC: 4 G/DL — SIGNIFICANT CHANGE UP (ref 3.3–5)
ALBUMIN SERPL ELPH-MCNC: 4.2 G/DL
ALBUMIN SERPL ELPH-MCNC: 4.2 G/DL — SIGNIFICANT CHANGE UP (ref 3.3–5)
ALP BLD-CCNC: 164 U/L
ALP SERPL-CCNC: 148 U/L — HIGH (ref 40–120)
ALP SERPL-CCNC: 154 U/L — HIGH (ref 40–120)
ALP SERPL-CCNC: 156 U/L — HIGH (ref 40–120)
ALP SERPL-CCNC: 162 U/L — HIGH (ref 40–120)
ALP SERPL-CCNC: 164 U/L — HIGH (ref 40–120)
ALP SERPL-CCNC: 168 U/L — HIGH (ref 40–120)
ALP SERPL-CCNC: 177 U/L — HIGH (ref 40–120)
ALP SERPL-CCNC: 181 U/L — HIGH (ref 40–120)
ALP SERPL-CCNC: 183 U/L — HIGH (ref 40–120)
ALT FLD-CCNC: 13 U/L — SIGNIFICANT CHANGE UP (ref 4–41)
ALT FLD-CCNC: 14 U/L — SIGNIFICANT CHANGE UP (ref 4–41)
ALT FLD-CCNC: 15 U/L — SIGNIFICANT CHANGE UP (ref 4–41)
ALT FLD-CCNC: 17 U/L — SIGNIFICANT CHANGE UP (ref 4–41)
ALT FLD-CCNC: 18 U/L — SIGNIFICANT CHANGE UP (ref 12–78)
ALT FLD-CCNC: 19 U/L — SIGNIFICANT CHANGE UP (ref 12–78)
ALT FLD-CCNC: 19 U/L — SIGNIFICANT CHANGE UP (ref 12–78)
ALT FLD-CCNC: 21 U/L — SIGNIFICANT CHANGE UP (ref 12–78)
ALT FLD-CCNC: 22 U/L — SIGNIFICANT CHANGE UP (ref 12–78)
ALT SERPL-CCNC: 23 U/L
AMMONIA BLD-MCNC: 30 UMOL/L — SIGNIFICANT CHANGE UP (ref 11–32)
ANION GAP SERPL CALC-SCNC: 10 MMOL/L — SIGNIFICANT CHANGE UP (ref 7–14)
ANION GAP SERPL CALC-SCNC: 10 MMOL/L — SIGNIFICANT CHANGE UP (ref 7–14)
ANION GAP SERPL CALC-SCNC: 12 MMOL/L
ANION GAP SERPL CALC-SCNC: 12 MMOL/L — SIGNIFICANT CHANGE UP (ref 7–14)
ANION GAP SERPL CALC-SCNC: 2 MMOL/L — LOW (ref 5–17)
ANION GAP SERPL CALC-SCNC: 2 MMOL/L — LOW (ref 5–17)
ANION GAP SERPL CALC-SCNC: 3 MMOL/L — LOW (ref 5–17)
ANION GAP SERPL CALC-SCNC: 3 MMOL/L — LOW (ref 5–17)
ANION GAP SERPL CALC-SCNC: 4 MMOL/L — LOW (ref 5–17)
ANION GAP SERPL CALC-SCNC: 7 MMOL/L — SIGNIFICANT CHANGE UP (ref 5–17)
ANION GAP SERPL CALC-SCNC: 7 MMOL/L — SIGNIFICANT CHANGE UP (ref 7–14)
ANION GAP SERPL CALC-SCNC: 7 MMOL/L — SIGNIFICANT CHANGE UP (ref 7–14)
ANION GAP SERPL CALC-SCNC: 9 MMOL/L — SIGNIFICANT CHANGE UP (ref 7–14)
ANION GAP SERPL CALC-SCNC: 9 MMOL/L — SIGNIFICANT CHANGE UP (ref 7–14)
APPEARANCE UR: ABNORMAL
APTT BLD: 42.5 SEC — HIGH (ref 27–36.3)
AST SERPL-CCNC: 20 U/L — SIGNIFICANT CHANGE UP (ref 15–37)
AST SERPL-CCNC: 20 U/L — SIGNIFICANT CHANGE UP (ref 4–40)
AST SERPL-CCNC: 22 U/L — SIGNIFICANT CHANGE UP (ref 15–37)
AST SERPL-CCNC: 23 U/L — SIGNIFICANT CHANGE UP (ref 4–40)
AST SERPL-CCNC: 28 U/L — SIGNIFICANT CHANGE UP (ref 15–37)
AST SERPL-CCNC: 29 U/L — SIGNIFICANT CHANGE UP (ref 4–40)
AST SERPL-CCNC: 30 U/L — SIGNIFICANT CHANGE UP (ref 4–40)
AST SERPL-CCNC: 31 U/L
AST SERPL-CCNC: 32 U/L — SIGNIFICANT CHANGE UP (ref 15–37)
AST SERPL-CCNC: 38 U/L — HIGH (ref 15–37)
BACTERIA # UR AUTO: ABNORMAL
BASE EXCESS BLDA CALC-SCNC: 12.9 MMOL/L — HIGH (ref -2–3)
BASOPHILS # BLD AUTO: 0.02 K/UL — SIGNIFICANT CHANGE UP (ref 0–0.2)
BASOPHILS # BLD AUTO: 0.04 K/UL — SIGNIFICANT CHANGE UP (ref 0–0.2)
BASOPHILS # BLD AUTO: 0.04 K/UL — SIGNIFICANT CHANGE UP (ref 0–0.2)
BASOPHILS # BLD AUTO: 0.05 K/UL — SIGNIFICANT CHANGE UP (ref 0–0.2)
BASOPHILS # BLD AUTO: 0.06 K/UL
BASOPHILS NFR BLD AUTO: 0.2 % — SIGNIFICANT CHANGE UP (ref 0–2)
BASOPHILS NFR BLD AUTO: 0.9 % — SIGNIFICANT CHANGE UP (ref 0–2)
BASOPHILS NFR BLD AUTO: 1 % — SIGNIFICANT CHANGE UP (ref 0–2)
BASOPHILS NFR BLD AUTO: 1.2 % — SIGNIFICANT CHANGE UP (ref 0–2)
BASOPHILS NFR BLD AUTO: 1.6 %
BILIRUB DIRECT SERPL-MCNC: 0.3 MG/DL — SIGNIFICANT CHANGE UP (ref 0–0.3)
BILIRUB DIRECT SERPL-MCNC: 0.8 MG/DL — HIGH (ref 0–0.3)
BILIRUB INDIRECT FLD-MCNC: 0.3 MG/DL — SIGNIFICANT CHANGE UP (ref 0–1)
BILIRUB INDIRECT FLD-MCNC: 0.7 MG/DL — SIGNIFICANT CHANGE UP (ref 0.2–1)
BILIRUB SERPL-MCNC: 0.6 MG/DL — SIGNIFICANT CHANGE UP (ref 0.2–1.2)
BILIRUB SERPL-MCNC: 0.8 MG/DL — SIGNIFICANT CHANGE UP (ref 0.2–1.2)
BILIRUB SERPL-MCNC: 0.9 MG/DL — SIGNIFICANT CHANGE UP (ref 0.2–1.2)
BILIRUB SERPL-MCNC: 1.1 MG/DL — SIGNIFICANT CHANGE UP (ref 0.2–1.2)
BILIRUB SERPL-MCNC: 1.2 MG/DL
BILIRUB SERPL-MCNC: 1.3 MG/DL — HIGH (ref 0.2–1.2)
BILIRUB SERPL-MCNC: 1.5 MG/DL — HIGH (ref 0.2–1.2)
BILIRUB SERPL-MCNC: 1.6 MG/DL — HIGH (ref 0.2–1.2)
BILIRUB SERPL-MCNC: 2.2 MG/DL — HIGH (ref 0.2–1.2)
BILIRUB SERPL-MCNC: 2.3 MG/DL — HIGH (ref 0.2–1.2)
BILIRUB UR-MCNC: NEGATIVE — SIGNIFICANT CHANGE UP
BLD GP AB SCN SERPL QL: NEGATIVE — SIGNIFICANT CHANGE UP
BLOOD GAS COMMENTS ARTERIAL: SIGNIFICANT CHANGE UP
BUN SERPL-MCNC: 25 MG/DL — HIGH (ref 7–23)
BUN SERPL-MCNC: 25 MG/DL — HIGH (ref 7–23)
BUN SERPL-MCNC: 26 MG/DL
BUN SERPL-MCNC: 28 MG/DL — HIGH (ref 7–23)
BUN SERPL-MCNC: 28 MG/DL — HIGH (ref 7–23)
BUN SERPL-MCNC: 29 MG/DL — HIGH (ref 7–23)
BUN SERPL-MCNC: 29 MG/DL — HIGH (ref 7–23)
BUN SERPL-MCNC: 30 MG/DL — HIGH (ref 7–23)
BUN SERPL-MCNC: 30 MG/DL — HIGH (ref 7–23)
BUN SERPL-MCNC: 32 MG/DL — HIGH (ref 7–23)
BUN SERPL-MCNC: 35 MG/DL — HIGH (ref 7–23)
BUN SERPL-MCNC: 36 MG/DL — HIGH (ref 7–23)
CA-I BLD-SCNC: 5.9 MG/DL — HIGH (ref 4.5–5.6)
CALCIUM SERPL-MCNC: 10.1 MG/DL — SIGNIFICANT CHANGE UP (ref 8.4–10.5)
CALCIUM SERPL-MCNC: 10.2 MG/DL — SIGNIFICANT CHANGE UP (ref 8.4–10.5)
CALCIUM SERPL-MCNC: 10.5 MG/DL
CALCIUM SERPL-MCNC: 10.6 MG/DL — HIGH (ref 8.5–10.1)
CALCIUM SERPL-MCNC: 10.7 MG/DL — HIGH (ref 8.4–10.5)
CALCIUM SERPL-MCNC: 10.7 MG/DL — HIGH (ref 8.5–10.1)
CALCIUM SERPL-MCNC: 10.8 MG/DL — HIGH (ref 8.4–10.5)
CALCIUM SERPL-MCNC: 10.8 — SIGNIFICANT CHANGE UP
CALCIUM SERPL-MCNC: 10.9 MG/DL — HIGH (ref 8.4–10.5)
CALCIUM SERPL-MCNC: 10.9 MG/DL — HIGH (ref 8.4–10.5)
CALCIUM SERPL-MCNC: 11.4 MG/DL — HIGH (ref 8.5–10.1)
CALCIUM SERPL-MCNC: 11.4 MG/DL — HIGH (ref 8.5–10.1)
CALCIUM SERPL-MCNC: 11.7 MG/DL — HIGH (ref 8.5–10.1)
CALCIUM SERPL-MCNC: 11.9 MG/DL — HIGH (ref 8.5–10.1)
CHLORIDE SERPL-SCNC: 100 MMOL/L — SIGNIFICANT CHANGE UP (ref 96–108)
CHLORIDE SERPL-SCNC: 100 MMOL/L — SIGNIFICANT CHANGE UP (ref 98–107)
CHLORIDE SERPL-SCNC: 100 MMOL/L — SIGNIFICANT CHANGE UP (ref 98–107)
CHLORIDE SERPL-SCNC: 101 MMOL/L
CHLORIDE SERPL-SCNC: 102 MMOL/L — SIGNIFICANT CHANGE UP (ref 98–107)
CHLORIDE SERPL-SCNC: 103 MMOL/L — SIGNIFICANT CHANGE UP (ref 98–107)
CHLORIDE SERPL-SCNC: 98 MMOL/L — SIGNIFICANT CHANGE UP (ref 96–108)
CHLORIDE SERPL-SCNC: 98 MMOL/L — SIGNIFICANT CHANGE UP (ref 98–107)
CHLORIDE SERPL-SCNC: 99 MMOL/L — SIGNIFICANT CHANGE UP (ref 96–108)
CK MB BLD-MCNC: 6.3 % — HIGH (ref 0–2.5)
CK MB CFR SERPL CALC: 4.6 NG/ML — SIGNIFICANT CHANGE UP
CK SERPL-CCNC: 73 U/L — SIGNIFICANT CHANGE UP (ref 30–200)
CO2 BLDA-SCNC: 44 MMOL/L — HIGH (ref 19–24)
CO2 SERPL-SCNC: 24 MMOL/L
CO2 SERPL-SCNC: 26 MMOL/L — SIGNIFICANT CHANGE UP (ref 22–31)
CO2 SERPL-SCNC: 27 MMOL/L — SIGNIFICANT CHANGE UP (ref 22–31)
CO2 SERPL-SCNC: 28 MMOL/L — SIGNIFICANT CHANGE UP (ref 22–31)
CO2 SERPL-SCNC: 28 MMOL/L — SIGNIFICANT CHANGE UP (ref 22–31)
CO2 SERPL-SCNC: 33 MMOL/L — HIGH (ref 22–31)
CO2 SERPL-SCNC: 35 MMOL/L — HIGH (ref 22–31)
CO2 SERPL-SCNC: 36 MMOL/L — HIGH (ref 22–31)
CO2 SERPL-SCNC: 36 MMOL/L — HIGH (ref 22–31)
CO2 SERPL-SCNC: 40 MMOL/L — HIGH (ref 22–31)
COLOR SPEC: YELLOW — SIGNIFICANT CHANGE UP
CREAT SERPL-MCNC: 0.77 MG/DL — SIGNIFICANT CHANGE UP (ref 0.5–1.3)
CREAT SERPL-MCNC: 0.81 MG/DL — SIGNIFICANT CHANGE UP (ref 0.5–1.3)
CREAT SERPL-MCNC: 0.84 MG/DL — SIGNIFICANT CHANGE UP (ref 0.5–1.3)
CREAT SERPL-MCNC: 0.89 MG/DL — SIGNIFICANT CHANGE UP (ref 0.5–1.3)
CREAT SERPL-MCNC: 0.89 MG/DL — SIGNIFICANT CHANGE UP (ref 0.5–1.3)
CREAT SERPL-MCNC: 0.91 MG/DL — SIGNIFICANT CHANGE UP (ref 0.5–1.3)
CREAT SERPL-MCNC: 0.92 MG/DL — SIGNIFICANT CHANGE UP (ref 0.5–1.3)
CREAT SERPL-MCNC: 0.94 MG/DL
CREAT SERPL-MCNC: 0.96 MG/DL — SIGNIFICANT CHANGE UP (ref 0.5–1.3)
CREAT SERPL-MCNC: 0.97 MG/DL — SIGNIFICANT CHANGE UP (ref 0.5–1.3)
CREAT SERPL-MCNC: 0.99 MG/DL — SIGNIFICANT CHANGE UP (ref 0.5–1.3)
CREAT SERPL-MCNC: 1.01 MG/DL — SIGNIFICANT CHANGE UP (ref 0.5–1.3)
CREAT SERPL-MCNC: 1.02 MG/DL — SIGNIFICANT CHANGE UP (ref 0.5–1.3)
CREAT SERPL-MCNC: 1.04 MG/DL — SIGNIFICANT CHANGE UP (ref 0.5–1.3)
CREAT SERPL-MCNC: 1.32 MG/DL — HIGH (ref 0.5–1.3)
CULTURE RESULTS: SIGNIFICANT CHANGE UP
DIFF PNL FLD: ABNORMAL
EGFR: 53 ML/MIN/1.73M2 — LOW
EGFR: 70 ML/MIN/1.73M2 — SIGNIFICANT CHANGE UP
EGFR: 72 ML/MIN/1.73M2 — SIGNIFICANT CHANGE UP
EGFR: 72 ML/MIN/1.73M2 — SIGNIFICANT CHANGE UP
EGFR: 74 ML/MIN/1.73M2 — SIGNIFICANT CHANGE UP
EGFR: 76 ML/MIN/1.73M2 — SIGNIFICANT CHANGE UP
EGFR: 77 ML/MIN/1.73M2 — SIGNIFICANT CHANGE UP
EGFR: 79 ML/MIN/1.73M2
EGFR: 81 ML/MIN/1.73M2 — SIGNIFICANT CHANGE UP
EGFR: 82 ML/MIN/1.73M2 — SIGNIFICANT CHANGE UP
EGFR: 83 ML/MIN/1.73M2 — SIGNIFICANT CHANGE UP
EGFR: 83 ML/MIN/1.73M2 — SIGNIFICANT CHANGE UP
EGFR: 85 ML/MIN/1.73M2 — SIGNIFICANT CHANGE UP
EGFR: 86 ML/MIN/1.73M2 — SIGNIFICANT CHANGE UP
EGFR: 87 ML/MIN/1.73M2 — SIGNIFICANT CHANGE UP
EOSINOPHIL # BLD AUTO: 0 K/UL — SIGNIFICANT CHANGE UP (ref 0–0.5)
EOSINOPHIL # BLD AUTO: 0.09 K/UL — SIGNIFICANT CHANGE UP (ref 0–0.5)
EOSINOPHIL # BLD AUTO: 0.11 K/UL — SIGNIFICANT CHANGE UP (ref 0–0.5)
EOSINOPHIL # BLD AUTO: 0.12 K/UL — SIGNIFICANT CHANGE UP (ref 0–0.5)
EOSINOPHIL # BLD AUTO: 0.13 K/UL
EOSINOPHIL NFR BLD AUTO: 0 % — SIGNIFICANT CHANGE UP (ref 0–6)
EOSINOPHIL NFR BLD AUTO: 2.1 % — SIGNIFICANT CHANGE UP (ref 0–6)
EOSINOPHIL NFR BLD AUTO: 2.7 % — SIGNIFICANT CHANGE UP (ref 0–6)
EOSINOPHIL NFR BLD AUTO: 3 % — SIGNIFICANT CHANGE UP (ref 0–6)
EOSINOPHIL NFR BLD AUTO: 3.4 %
EPI CELLS # UR: SIGNIFICANT CHANGE UP
FERRITIN SERPL-MCNC: 31 NG/ML — SIGNIFICANT CHANGE UP (ref 30–400)
FLUAV AG NPH QL: SIGNIFICANT CHANGE UP
FLUAV AG NPH QL: SIGNIFICANT CHANGE UP
FLUBV AG NPH QL: SIGNIFICANT CHANGE UP
FLUBV AG NPH QL: SIGNIFICANT CHANGE UP
FOLATE SERPL-MCNC: >20 NG/ML — HIGH (ref 3.1–17.5)
GAS PNL BLDA: SIGNIFICANT CHANGE UP
GLUCOSE BLDC GLUCOMTR-MCNC: 120 MG/DL — HIGH (ref 70–99)
GLUCOSE SERPL-MCNC: 105 MG/DL — HIGH (ref 70–99)
GLUCOSE SERPL-MCNC: 106 MG/DL — HIGH (ref 70–99)
GLUCOSE SERPL-MCNC: 114 MG/DL — HIGH (ref 70–99)
GLUCOSE SERPL-MCNC: 116 MG/DL — HIGH (ref 70–99)
GLUCOSE SERPL-MCNC: 120 MG/DL — HIGH (ref 70–99)
GLUCOSE SERPL-MCNC: 124 MG/DL — HIGH (ref 70–99)
GLUCOSE SERPL-MCNC: 126 MG/DL — HIGH (ref 70–99)
GLUCOSE SERPL-MCNC: 135 MG/DL — HIGH (ref 70–99)
GLUCOSE SERPL-MCNC: 139 MG/DL
GLUCOSE SERPL-MCNC: 165 MG/DL — HIGH (ref 70–99)
GLUCOSE SERPL-MCNC: 85 MG/DL — SIGNIFICANT CHANGE UP (ref 70–99)
GLUCOSE SERPL-MCNC: 95 MG/DL — SIGNIFICANT CHANGE UP (ref 70–99)
GLUCOSE SERPL-MCNC: 96 MG/DL — SIGNIFICANT CHANGE UP (ref 70–99)
GLUCOSE SERPL-MCNC: 97 MG/DL — SIGNIFICANT CHANGE UP (ref 70–99)
GLUCOSE UR QL: NEGATIVE MG/DL — SIGNIFICANT CHANGE UP
GRAM STN FLD: SIGNIFICANT CHANGE UP
HCO3 BLDA-SCNC: 42 MMOL/L — HIGH (ref 21–28)
HCT VFR BLD CALC: 22.7 % — LOW (ref 39–50)
HCT VFR BLD CALC: 24.1 % — LOW (ref 39–50)
HCT VFR BLD CALC: 25.3 % — LOW (ref 39–50)
HCT VFR BLD CALC: 25.4 % — LOW (ref 39–50)
HCT VFR BLD CALC: 25.5 % — LOW (ref 39–50)
HCT VFR BLD CALC: 25.8 % — LOW (ref 39–50)
HCT VFR BLD CALC: 26.8 % — LOW (ref 39–50)
HCT VFR BLD CALC: 27.2 %
HCT VFR BLD CALC: 27.3 % — LOW (ref 39–50)
HCT VFR BLD CALC: 27.7 % — LOW (ref 39–50)
HCT VFR BLD CALC: 28.4 % — LOW (ref 39–50)
HCT VFR BLD CALC: 28.7 % — LOW (ref 39–50)
HCT VFR BLD CALC: 29.3 % — LOW (ref 39–50)
HCT VFR BLD CALC: 32.4 % — LOW (ref 39–50)
HCT VFR BLD CALC: 32.7 % — LOW (ref 39–50)
HCT VFR BLD CALC: 33.2 % — LOW (ref 39–50)
HCT VFR BLD CALC: 34.9 % — LOW (ref 39–50)
HCT VFR BLD CALC: 34.9 % — LOW (ref 39–50)
HCT VFR BLD CALC: 35.8 % — LOW (ref 39–50)
HGB BLD-MCNC: 10 G/DL — LOW (ref 13–17)
HGB BLD-MCNC: 10 G/DL — LOW (ref 13–17)
HGB BLD-MCNC: 6.5 G/DL — CRITICAL LOW (ref 13–17)
HGB BLD-MCNC: 6.9 G/DL — CRITICAL LOW (ref 13–17)
HGB BLD-MCNC: 7.2 G/DL — LOW (ref 13–17)
HGB BLD-MCNC: 7.3 G/DL — LOW (ref 13–17)
HGB BLD-MCNC: 7.4 G/DL — LOW (ref 13–17)
HGB BLD-MCNC: 7.8 G/DL
HGB BLD-MCNC: 7.8 G/DL — LOW (ref 13–17)
HGB BLD-MCNC: 7.9 G/DL — LOW (ref 13–17)
HGB BLD-MCNC: 8 G/DL — LOW (ref 13–17)
HGB BLD-MCNC: 8.2 G/DL — LOW (ref 13–17)
HGB BLD-MCNC: 8.6 G/DL — LOW (ref 13–17)
HGB BLD-MCNC: 8.6 G/DL — LOW (ref 13–17)
HGB BLD-MCNC: 9 G/DL — LOW (ref 13–17)
HGB BLD-MCNC: 9.5 G/DL — LOW (ref 13–17)
HGB BLD-MCNC: 9.6 G/DL — LOW (ref 13–17)
HGB BLD-MCNC: 9.7 G/DL — LOW (ref 13–17)
HGB BLD-MCNC: 9.9 G/DL — LOW (ref 13–17)
HOROWITZ INDEX BLDA+IHG-RTO: 32 — SIGNIFICANT CHANGE UP
IANC: 2.62 K/UL — SIGNIFICANT CHANGE UP (ref 1.8–7.4)
IANC: 2.72 K/UL — SIGNIFICANT CHANGE UP (ref 1.8–7.4)
IANC: 3.07 K/UL — SIGNIFICANT CHANGE UP (ref 1.8–7.4)
IMM GRANULOCYTES NFR BLD AUTO: 0.3 %
IMM GRANULOCYTES NFR BLD AUTO: 0.5 % — SIGNIFICANT CHANGE UP (ref 0–0.9)
IMM GRANULOCYTES NFR BLD AUTO: 0.5 % — SIGNIFICANT CHANGE UP (ref 0–0.9)
IMM GRANULOCYTES NFR BLD AUTO: 0.7 % — SIGNIFICANT CHANGE UP (ref 0–0.9)
IMM GRANULOCYTES NFR BLD AUTO: 1.6 % — HIGH (ref 0–0.9)
INR BLD: 1.45 RATIO — HIGH (ref 0.88–1.16)
INR BLD: 2.13 RATIO — HIGH (ref 0.88–1.16)
IRON SATN MFR SERPL: 28 UG/DL — LOW (ref 45–165)
IRON SATN MFR SERPL: 8 % — LOW (ref 14–50)
KETONES UR-MCNC: NEGATIVE — SIGNIFICANT CHANGE UP
LACTATE SERPL-SCNC: 1.5 MMOL/L — SIGNIFICANT CHANGE UP (ref 0.7–2)
LEUKOCYTE ESTERASE UR-ACNC: ABNORMAL
LYMPHOCYTES # BLD AUTO: 0.6 K/UL — LOW (ref 1–3.3)
LYMPHOCYTES # BLD AUTO: 0.64 K/UL — LOW (ref 1–3.3)
LYMPHOCYTES # BLD AUTO: 0.74 K/UL — LOW (ref 1–3.3)
LYMPHOCYTES # BLD AUTO: 0.75 K/UL
LYMPHOCYTES # BLD AUTO: 0.75 K/UL — LOW (ref 1–3.3)
LYMPHOCYTES # BLD AUTO: 14.8 % — SIGNIFICANT CHANGE UP (ref 13–44)
LYMPHOCYTES # BLD AUTO: 18.2 % — SIGNIFICANT CHANGE UP (ref 13–44)
LYMPHOCYTES # BLD AUTO: 18.8 % — SIGNIFICANT CHANGE UP (ref 13–44)
LYMPHOCYTES # BLD AUTO: 4.9 % — LOW (ref 13–44)
LYMPHOCYTES NFR BLD AUTO: 19.8 %
MAGNESIUM SERPL-MCNC: 1.7 MG/DL — SIGNIFICANT CHANGE UP (ref 1.6–2.6)
MAGNESIUM SERPL-MCNC: 1.8 MG/DL — SIGNIFICANT CHANGE UP (ref 1.6–2.6)
MAGNESIUM SERPL-MCNC: 1.8 MG/DL — SIGNIFICANT CHANGE UP (ref 1.6–2.6)
MAGNESIUM SERPL-MCNC: 1.9 MG/DL — SIGNIFICANT CHANGE UP (ref 1.6–2.6)
MAGNESIUM SERPL-MCNC: 2 MG/DL — SIGNIFICANT CHANGE UP (ref 1.6–2.6)
MAGNESIUM SERPL-MCNC: 2.3 MG/DL — SIGNIFICANT CHANGE UP (ref 1.6–2.6)
MAGNESIUM SERPL-MCNC: 2.4 MG/DL — SIGNIFICANT CHANGE UP (ref 1.6–2.6)
MAN DIFF?: NORMAL
MCHC RBC-ENTMCNC: 24.3 PG — LOW (ref 27–34)
MCHC RBC-ENTMCNC: 24.5 PG — LOW (ref 27–34)
MCHC RBC-ENTMCNC: 24.7 PG — LOW (ref 27–34)
MCHC RBC-ENTMCNC: 24.8 PG — LOW (ref 27–34)
MCHC RBC-ENTMCNC: 25 PG — LOW (ref 27–34)
MCHC RBC-ENTMCNC: 25.4 PG
MCHC RBC-ENTMCNC: 25.5 PG — LOW (ref 27–34)
MCHC RBC-ENTMCNC: 25.5 PG — LOW (ref 27–34)
MCHC RBC-ENTMCNC: 25.6 PG — LOW (ref 27–34)
MCHC RBC-ENTMCNC: 25.7 PG — LOW (ref 27–34)
MCHC RBC-ENTMCNC: 25.7 PG — LOW (ref 27–34)
MCHC RBC-ENTMCNC: 25.8 PG — LOW (ref 27–34)
MCHC RBC-ENTMCNC: 25.9 PG — LOW (ref 27–34)
MCHC RBC-ENTMCNC: 26.1 PG — LOW (ref 27–34)
MCHC RBC-ENTMCNC: 27.9 G/DL — LOW (ref 32–36)
MCHC RBC-ENTMCNC: 28.2 GM/DL — LOW (ref 32–36)
MCHC RBC-ENTMCNC: 28.4 G/DL — LOW (ref 32–36)
MCHC RBC-ENTMCNC: 28.6 GM/DL — LOW (ref 32–36)
MCHC RBC-ENTMCNC: 28.6 GM/DL — LOW (ref 32–36)
MCHC RBC-ENTMCNC: 28.7 G/DL — LOW (ref 32–36)
MCHC RBC-ENTMCNC: 28.7 GM/DL
MCHC RBC-ENTMCNC: 28.7 GM/DL — LOW (ref 32–36)
MCHC RBC-ENTMCNC: 28.9 G/DL — LOW (ref 32–36)
MCHC RBC-ENTMCNC: 28.9 GM/DL — LOW (ref 32–36)
MCHC RBC-ENTMCNC: 29.1 G/DL — LOW (ref 32–36)
MCHC RBC-ENTMCNC: 29.1 GM/DL — LOW (ref 32–36)
MCHC RBC-ENTMCNC: 29.2 GM/DL — LOW (ref 32–36)
MCHC RBC-ENTMCNC: 29.9 G/DL — LOW (ref 32–36)
MCHC RBC-ENTMCNC: 30 GM/DL — LOW (ref 32–36)
MCHC RBC-ENTMCNC: 30 GM/DL — LOW (ref 32–36)
MCHC RBC-ENTMCNC: 30.3 GM/DL — LOW (ref 32–36)
MCHC RBC-ENTMCNC: 30.6 GM/DL — LOW (ref 32–36)
MCHC RBC-ENTMCNC: 30.7 GM/DL — LOW (ref 32–36)
MCV RBC AUTO: 83.5 FL — SIGNIFICANT CHANGE UP (ref 80–100)
MCV RBC AUTO: 84.4 FL — SIGNIFICANT CHANGE UP (ref 80–100)
MCV RBC AUTO: 84.5 FL — SIGNIFICANT CHANGE UP (ref 80–100)
MCV RBC AUTO: 84.7 FL — SIGNIFICANT CHANGE UP (ref 80–100)
MCV RBC AUTO: 84.8 FL — SIGNIFICANT CHANGE UP (ref 80–100)
MCV RBC AUTO: 85 FL — SIGNIFICANT CHANGE UP (ref 80–100)
MCV RBC AUTO: 85 FL — SIGNIFICANT CHANGE UP (ref 80–100)
MCV RBC AUTO: 85.5 FL — SIGNIFICANT CHANGE UP (ref 80–100)
MCV RBC AUTO: 85.5 FL — SIGNIFICANT CHANGE UP (ref 80–100)
MCV RBC AUTO: 85.7 FL — SIGNIFICANT CHANGE UP (ref 80–100)
MCV RBC AUTO: 85.8 FL — SIGNIFICANT CHANGE UP (ref 80–100)
MCV RBC AUTO: 86.1 FL — SIGNIFICANT CHANGE UP (ref 80–100)
MCV RBC AUTO: 87.3 FL — SIGNIFICANT CHANGE UP (ref 80–100)
MCV RBC AUTO: 88.1 FL — SIGNIFICANT CHANGE UP (ref 80–100)
MCV RBC AUTO: 88.6 FL
MCV RBC AUTO: 89 FL — SIGNIFICANT CHANGE UP (ref 80–100)
MCV RBC AUTO: 89.3 FL — SIGNIFICANT CHANGE UP (ref 80–100)
MCV RBC AUTO: 91.1 FL — SIGNIFICANT CHANGE UP (ref 80–100)
MCV RBC AUTO: 91.3 FL — SIGNIFICANT CHANGE UP (ref 80–100)
METHOD TYPE: SIGNIFICANT CHANGE UP
MONOCYTES # BLD AUTO: 0.43 K/UL — SIGNIFICANT CHANGE UP (ref 0–0.9)
MONOCYTES # BLD AUTO: 0.43 K/UL — SIGNIFICANT CHANGE UP (ref 0–0.9)
MONOCYTES # BLD AUTO: 0.46 K/UL — SIGNIFICANT CHANGE UP (ref 0–0.9)
MONOCYTES # BLD AUTO: 0.49 K/UL
MONOCYTES # BLD AUTO: 0.74 K/UL — SIGNIFICANT CHANGE UP (ref 0–0.9)
MONOCYTES NFR BLD AUTO: 10.6 % — SIGNIFICANT CHANGE UP (ref 2–14)
MONOCYTES NFR BLD AUTO: 10.6 % — SIGNIFICANT CHANGE UP (ref 2–14)
MONOCYTES NFR BLD AUTO: 10.8 % — SIGNIFICANT CHANGE UP (ref 2–14)
MONOCYTES NFR BLD AUTO: 13 %
MONOCYTES NFR BLD AUTO: 6 % — SIGNIFICANT CHANGE UP (ref 2–14)
NEUTROPHILS # BLD AUTO: 10.76 K/UL — HIGH (ref 1.8–7.4)
NEUTROPHILS # BLD AUTO: 2.34 K/UL
NEUTROPHILS # BLD AUTO: 2.62 K/UL — SIGNIFICANT CHANGE UP (ref 1.8–7.4)
NEUTROPHILS # BLD AUTO: 2.72 K/UL — SIGNIFICANT CHANGE UP (ref 1.8–7.4)
NEUTROPHILS # BLD AUTO: 3.07 K/UL — SIGNIFICANT CHANGE UP (ref 1.8–7.4)
NEUTROPHILS NFR BLD AUTO: 61.9 %
NEUTROPHILS NFR BLD AUTO: 65.9 % — SIGNIFICANT CHANGE UP (ref 43–77)
NEUTROPHILS NFR BLD AUTO: 66.8 % — SIGNIFICANT CHANGE UP (ref 43–77)
NEUTROPHILS NFR BLD AUTO: 70.9 % — SIGNIFICANT CHANGE UP (ref 43–77)
NEUTROPHILS NFR BLD AUTO: 87.3 % — HIGH (ref 43–77)
NITRITE UR-MCNC: NEGATIVE — SIGNIFICANT CHANGE UP
NRBC # BLD: 0 /100 WBCS — SIGNIFICANT CHANGE UP (ref 0–0)
NRBC # FLD: 0 K/UL — SIGNIFICANT CHANGE UP (ref 0–0)
NT-PROBNP SERPL-MCNC: 681 PG/ML
NT-PROBNP SERPL-SCNC: 8787 PG/ML — HIGH (ref 0–450)
OB PNL STL: POSITIVE
ORGANISM # SPEC MICROSCOPIC CNT: SIGNIFICANT CHANGE UP
PCO2 BLDA: 72 MMHG — CRITICAL HIGH (ref 32–46)
PH BLDA: 7.37 — SIGNIFICANT CHANGE UP (ref 7.35–7.45)
PH UR: 5 — SIGNIFICANT CHANGE UP (ref 5–8)
PHOSPHATE SERPL-MCNC: 2.5 MG/DL — SIGNIFICANT CHANGE UP (ref 2.5–4.5)
PHOSPHATE SERPL-MCNC: 2.7 MG/DL — SIGNIFICANT CHANGE UP (ref 2.5–4.5)
PHOSPHATE SERPL-MCNC: 3 MG/DL — SIGNIFICANT CHANGE UP (ref 2.5–4.5)
PHOSPHATE SERPL-MCNC: 3.2 MG/DL — SIGNIFICANT CHANGE UP (ref 2.5–4.5)
PHOSPHATE SERPL-MCNC: 3.4 MG/DL — SIGNIFICANT CHANGE UP (ref 2.5–4.5)
PHOSPHATE SERPL-MCNC: 3.5 MG/DL — SIGNIFICANT CHANGE UP (ref 2.5–4.5)
PLATELET # BLD AUTO: 124 K/UL — LOW (ref 150–400)
PLATELET # BLD AUTO: 127 K/UL — LOW (ref 150–400)
PLATELET # BLD AUTO: 137 K/UL — LOW (ref 150–400)
PLATELET # BLD AUTO: 138 K/UL — LOW (ref 150–400)
PLATELET # BLD AUTO: 138 K/UL — LOW (ref 150–400)
PLATELET # BLD AUTO: 140 K/UL — LOW (ref 150–400)
PLATELET # BLD AUTO: 145 K/UL — LOW (ref 150–400)
PLATELET # BLD AUTO: 146 K/UL — LOW (ref 150–400)
PLATELET # BLD AUTO: 148 K/UL — LOW (ref 150–400)
PLATELET # BLD AUTO: 149 K/UL — LOW (ref 150–400)
PLATELET # BLD AUTO: 150 K/UL — SIGNIFICANT CHANGE UP (ref 150–400)
PLATELET # BLD AUTO: 154 K/UL — SIGNIFICANT CHANGE UP (ref 150–400)
PLATELET # BLD AUTO: 168 K/UL
PLATELET # BLD AUTO: 172 K/UL — SIGNIFICANT CHANGE UP (ref 150–400)
PLATELET # BLD AUTO: 173 K/UL — SIGNIFICANT CHANGE UP (ref 150–400)
PLATELET # BLD AUTO: 181 K/UL — SIGNIFICANT CHANGE UP (ref 150–400)
PLATELET # BLD AUTO: 183 K/UL — SIGNIFICANT CHANGE UP (ref 150–400)
PLATELET # BLD AUTO: 196 K/UL — SIGNIFICANT CHANGE UP (ref 150–400)
PLATELET # BLD AUTO: 198 K/UL — SIGNIFICANT CHANGE UP (ref 150–400)
PO2 BLDA: 179 MMHG — HIGH (ref 83–108)
POTASSIUM SERPL-MCNC: 3.5 MMOL/L — SIGNIFICANT CHANGE UP (ref 3.5–5.3)
POTASSIUM SERPL-MCNC: 3.8 MMOL/L — SIGNIFICANT CHANGE UP (ref 3.5–5.3)
POTASSIUM SERPL-MCNC: 3.9 MMOL/L — SIGNIFICANT CHANGE UP (ref 3.5–5.3)
POTASSIUM SERPL-MCNC: 4.1 MMOL/L — SIGNIFICANT CHANGE UP (ref 3.5–5.3)
POTASSIUM SERPL-MCNC: 4.5 MMOL/L — SIGNIFICANT CHANGE UP (ref 3.5–5.3)
POTASSIUM SERPL-MCNC: 4.5 MMOL/L — SIGNIFICANT CHANGE UP (ref 3.5–5.3)
POTASSIUM SERPL-MCNC: 4.6 MMOL/L — SIGNIFICANT CHANGE UP (ref 3.5–5.3)
POTASSIUM SERPL-MCNC: 4.8 MMOL/L — SIGNIFICANT CHANGE UP (ref 3.5–5.3)
POTASSIUM SERPL-SCNC: 3.5 MMOL/L — SIGNIFICANT CHANGE UP (ref 3.5–5.3)
POTASSIUM SERPL-SCNC: 3.8 MMOL/L — SIGNIFICANT CHANGE UP (ref 3.5–5.3)
POTASSIUM SERPL-SCNC: 3.9 MMOL/L — SIGNIFICANT CHANGE UP (ref 3.5–5.3)
POTASSIUM SERPL-SCNC: 4.1 MMOL/L — SIGNIFICANT CHANGE UP (ref 3.5–5.3)
POTASSIUM SERPL-SCNC: 4.3 MMOL/L
POTASSIUM SERPL-SCNC: 4.5 MMOL/L — SIGNIFICANT CHANGE UP (ref 3.5–5.3)
POTASSIUM SERPL-SCNC: 4.5 MMOL/L — SIGNIFICANT CHANGE UP (ref 3.5–5.3)
POTASSIUM SERPL-SCNC: 4.6 MMOL/L — SIGNIFICANT CHANGE UP (ref 3.5–5.3)
POTASSIUM SERPL-SCNC: 4.8 MMOL/L — SIGNIFICANT CHANGE UP (ref 3.5–5.3)
PROT SERPL-MCNC: 5.7 G/DL — LOW (ref 6–8.3)
PROT SERPL-MCNC: 6 G/DL — SIGNIFICANT CHANGE UP (ref 6–8.3)
PROT SERPL-MCNC: 6.3 GM/DL — SIGNIFICANT CHANGE UP (ref 6–8.3)
PROT SERPL-MCNC: 6.4 G/DL
PROT SERPL-MCNC: 6.5 GM/DL — SIGNIFICANT CHANGE UP (ref 6–8.3)
PROT SERPL-MCNC: 6.6 GM/DL — SIGNIFICANT CHANGE UP (ref 6–8.3)
PROT SERPL-MCNC: 6.6 GM/DL — SIGNIFICANT CHANGE UP (ref 6–8.3)
PROT SERPL-MCNC: 6.7 G/DL — SIGNIFICANT CHANGE UP (ref 6–8.3)
PROT SERPL-MCNC: 6.8 GM/DL — SIGNIFICANT CHANGE UP (ref 6–8.3)
PROT SERPL-MCNC: 6.9 G/DL — SIGNIFICANT CHANGE UP (ref 6–8.3)
PROT UR-MCNC: 100 MG/DL
PROTHROM AB SERPL-ACNC: 16.9 SEC — HIGH (ref 10.5–13.4)
PROTHROM AB SERPL-ACNC: 24.9 SEC — HIGH (ref 10.5–13.4)
PTH RELATED PROT SERPL-MCNC: <2 PMOL/L — SIGNIFICANT CHANGE UP
PTH-INTACT FLD-MCNC: 110 PG/ML — HIGH (ref 15–65)
PTH-INTACT FLD-MCNC: 93 PG/ML — HIGH (ref 15–65)
RBC # BLD: 2.67 M/UL — LOW (ref 4.2–5.8)
RBC # BLD: 2.82 M/UL — LOW (ref 4.2–5.8)
RBC # BLD: 2.96 M/UL — LOW (ref 4.2–5.8)
RBC # BLD: 2.96 M/UL — LOW (ref 4.2–5.8)
RBC # BLD: 3 M/UL — LOW (ref 4.2–5.8)
RBC # BLD: 3.07 M/UL
RBC # BLD: 3.09 M/UL — LOW (ref 4.2–5.8)
RBC # BLD: 3.11 M/UL — LOW (ref 4.2–5.8)
RBC # BLD: 3.16 M/UL — LOW (ref 4.2–5.8)
RBC # BLD: 3.21 M/UL — LOW (ref 4.2–5.8)
RBC # BLD: 3.23 M/UL — LOW (ref 4.2–5.8)
RBC # BLD: 3.35 M/UL — LOW (ref 4.2–5.8)
RBC # BLD: 3.36 M/UL — LOW (ref 4.2–5.8)
RBC # BLD: 3.47 M/UL — LOW (ref 4.2–5.8)
RBC # BLD: 3.71 M/UL — LOW (ref 4.2–5.8)
RBC # BLD: 3.71 M/UL — LOW (ref 4.2–5.8)
RBC # BLD: 3.73 M/UL — LOW (ref 4.2–5.8)
RBC # BLD: 3.83 M/UL — LOW (ref 4.2–5.8)
RBC # BLD: 3.91 M/UL — LOW (ref 4.2–5.8)
RBC # BLD: 3.92 M/UL — LOW (ref 4.2–5.8)
RBC # FLD: 16.1 % — HIGH (ref 10.3–14.5)
RBC # FLD: 16.1 % — HIGH (ref 10.3–14.5)
RBC # FLD: 16.2 % — HIGH (ref 10.3–14.5)
RBC # FLD: 16.4 % — HIGH (ref 10.3–14.5)
RBC # FLD: 16.6 % — HIGH (ref 10.3–14.5)
RBC # FLD: 16.6 % — HIGH (ref 10.3–14.5)
RBC # FLD: 16.8 % — HIGH (ref 10.3–14.5)
RBC # FLD: 17.4 %
RBC # FLD: 18.1 % — HIGH (ref 10.3–14.5)
RBC # FLD: 18.1 % — HIGH (ref 10.3–14.5)
RBC # FLD: 18.2 % — HIGH (ref 10.3–14.5)
RBC # FLD: 18.3 % — HIGH (ref 10.3–14.5)
RBC # FLD: 18.4 % — HIGH (ref 10.3–14.5)
RBC # FLD: 18.5 % — HIGH (ref 10.3–14.5)
RBC CASTS # UR COMP ASSIST: SIGNIFICANT CHANGE UP /HPF (ref 0–4)
RETICS #: 39.2 K/UL — SIGNIFICANT CHANGE UP (ref 25–125)
RETICS/RBC NFR: 1.3 % — SIGNIFICANT CHANGE UP (ref 0.5–2.5)
RH IG SCN BLD-IMP: POSITIVE — SIGNIFICANT CHANGE UP
RSV RNA NPH QL NAA+NON-PROBE: SIGNIFICANT CHANGE UP
SAO2 % BLDA: 99.5 % — HIGH (ref 94–98)
SARS-COV-2 RNA SPEC QL NAA+PROBE: DETECTED
SARS-COV-2 RNA SPEC QL NAA+PROBE: SIGNIFICANT CHANGE UP
SODIUM SERPL-SCNC: 134 MMOL/L — LOW (ref 135–145)
SODIUM SERPL-SCNC: 135 MMOL/L — SIGNIFICANT CHANGE UP (ref 135–145)
SODIUM SERPL-SCNC: 135 MMOL/L — SIGNIFICANT CHANGE UP (ref 135–145)
SODIUM SERPL-SCNC: 136 MMOL/L — SIGNIFICANT CHANGE UP (ref 135–145)
SODIUM SERPL-SCNC: 136 MMOL/L — SIGNIFICANT CHANGE UP (ref 135–145)
SODIUM SERPL-SCNC: 137 MMOL/L — SIGNIFICANT CHANGE UP (ref 135–145)
SODIUM SERPL-SCNC: 138 MMOL/L
SODIUM SERPL-SCNC: 138 MMOL/L — SIGNIFICANT CHANGE UP (ref 135–145)
SODIUM SERPL-SCNC: 138 MMOL/L — SIGNIFICANT CHANGE UP (ref 135–145)
SODIUM SERPL-SCNC: 139 MMOL/L — SIGNIFICANT CHANGE UP (ref 135–145)
SODIUM SERPL-SCNC: 142 MMOL/L — SIGNIFICANT CHANGE UP (ref 135–145)
SP GR SPEC: 1.01 — SIGNIFICANT CHANGE UP (ref 1.01–1.02)
SPECIMEN SOURCE: SIGNIFICANT CHANGE UP
TIBC SERPL-MCNC: 347 UG/DL — SIGNIFICANT CHANGE UP (ref 220–430)
TROPONIN I, HIGH SENSITIVITY RESULT: 90.2 NG/L — HIGH
TROPONIN I, HIGH SENSITIVITY RESULT: 93.2 NG/L — HIGH
TROPONIN T, HIGH SENSITIVITY RESULT: 66 NG/L — CRITICAL HIGH
TSH SERPL-MCNC: 0.87 UIU/ML — SIGNIFICANT CHANGE UP (ref 0.36–3.74)
UIBC SERPL-MCNC: 319 UG/DL — SIGNIFICANT CHANGE UP (ref 110–370)
UROBILINOGEN FLD QL: 4 MG/DL
VIT B12 SERPL-MCNC: 1043 PG/ML — HIGH (ref 200–900)
VIT D25+D1,25 OH+D1,25 PNL SERPL-MCNC: 50.9 PG/ML — SIGNIFICANT CHANGE UP (ref 19.9–79.3)
WBC # BLD: 10.5 K/UL — SIGNIFICANT CHANGE UP (ref 3.8–10.5)
WBC # BLD: 12.32 K/UL — HIGH (ref 3.8–10.5)
WBC # BLD: 12.39 K/UL — HIGH (ref 3.8–10.5)
WBC # BLD: 3.73 K/UL — LOW (ref 3.8–10.5)
WBC # BLD: 3.98 K/UL — SIGNIFICANT CHANGE UP (ref 3.8–10.5)
WBC # BLD: 4 K/UL — SIGNIFICANT CHANGE UP (ref 3.8–10.5)
WBC # BLD: 4.07 K/UL — SIGNIFICANT CHANGE UP (ref 3.8–10.5)
WBC # BLD: 4.09 K/UL — SIGNIFICANT CHANGE UP (ref 3.8–10.5)
WBC # BLD: 4.33 K/UL — SIGNIFICANT CHANGE UP (ref 3.8–10.5)
WBC # BLD: 4.73 K/UL — SIGNIFICANT CHANGE UP (ref 3.8–10.5)
WBC # BLD: 5.07 K/UL — SIGNIFICANT CHANGE UP (ref 3.8–10.5)
WBC # BLD: 5.23 K/UL — SIGNIFICANT CHANGE UP (ref 3.8–10.5)
WBC # BLD: 5.97 K/UL — SIGNIFICANT CHANGE UP (ref 3.8–10.5)
WBC # BLD: 6.88 K/UL — SIGNIFICANT CHANGE UP (ref 3.8–10.5)
WBC # BLD: 6.91 K/UL — SIGNIFICANT CHANGE UP (ref 3.8–10.5)
WBC # BLD: 6.97 K/UL — SIGNIFICANT CHANGE UP (ref 3.8–10.5)
WBC # BLD: 7.57 K/UL — SIGNIFICANT CHANGE UP (ref 3.8–10.5)
WBC # BLD: 9.56 K/UL — SIGNIFICANT CHANGE UP (ref 3.8–10.5)
WBC # FLD AUTO: 10.5 K/UL — SIGNIFICANT CHANGE UP (ref 3.8–10.5)
WBC # FLD AUTO: 12.32 K/UL — HIGH (ref 3.8–10.5)
WBC # FLD AUTO: 12.39 K/UL — HIGH (ref 3.8–10.5)
WBC # FLD AUTO: 3.73 K/UL — LOW (ref 3.8–10.5)
WBC # FLD AUTO: 3.78 K/UL
WBC # FLD AUTO: 3.98 K/UL — SIGNIFICANT CHANGE UP (ref 3.8–10.5)
WBC # FLD AUTO: 4 K/UL — SIGNIFICANT CHANGE UP (ref 3.8–10.5)
WBC # FLD AUTO: 4.07 K/UL — SIGNIFICANT CHANGE UP (ref 3.8–10.5)
WBC # FLD AUTO: 4.09 K/UL — SIGNIFICANT CHANGE UP (ref 3.8–10.5)
WBC # FLD AUTO: 4.33 K/UL — SIGNIFICANT CHANGE UP (ref 3.8–10.5)
WBC # FLD AUTO: 4.73 K/UL — SIGNIFICANT CHANGE UP (ref 3.8–10.5)
WBC # FLD AUTO: 5.07 K/UL — SIGNIFICANT CHANGE UP (ref 3.8–10.5)
WBC # FLD AUTO: 5.23 K/UL — SIGNIFICANT CHANGE UP (ref 3.8–10.5)
WBC # FLD AUTO: 5.97 K/UL — SIGNIFICANT CHANGE UP (ref 3.8–10.5)
WBC # FLD AUTO: 6.88 K/UL — SIGNIFICANT CHANGE UP (ref 3.8–10.5)
WBC # FLD AUTO: 6.91 K/UL — SIGNIFICANT CHANGE UP (ref 3.8–10.5)
WBC # FLD AUTO: 6.97 K/UL — SIGNIFICANT CHANGE UP (ref 3.8–10.5)
WBC # FLD AUTO: 7.57 K/UL — SIGNIFICANT CHANGE UP (ref 3.8–10.5)
WBC # FLD AUTO: 9.56 K/UL — SIGNIFICANT CHANGE UP (ref 3.8–10.5)
WBC UR QL: ABNORMAL

## 2022-01-01 PROCEDURE — 71045 X-RAY EXAM CHEST 1 VIEW: CPT | Mod: 26

## 2022-01-01 PROCEDURE — 71046 X-RAY EXAM CHEST 2 VIEWS: CPT

## 2022-01-01 PROCEDURE — 78278 ACUTE GI BLOOD LOSS IMAGING: CPT | Mod: 26

## 2022-01-01 PROCEDURE — 99223 1ST HOSP IP/OBS HIGH 75: CPT

## 2022-01-01 PROCEDURE — 52000 CYSTOURETHROSCOPY: CPT

## 2022-01-01 PROCEDURE — 99222 1ST HOSP IP/OBS MODERATE 55: CPT

## 2022-01-01 PROCEDURE — 99214 OFFICE O/P EST MOD 30 MIN: CPT | Mod: 25

## 2022-01-01 PROCEDURE — 99233 SBSQ HOSP IP/OBS HIGH 50: CPT

## 2022-01-01 PROCEDURE — 93010 ELECTROCARDIOGRAM REPORT: CPT

## 2022-01-01 PROCEDURE — 71250 CT THORAX DX C-: CPT | Mod: 26

## 2022-01-01 PROCEDURE — 99232 SBSQ HOSP IP/OBS MODERATE 35: CPT

## 2022-01-01 PROCEDURE — 99212 OFFICE O/P EST SF 10 MIN: CPT

## 2022-01-01 PROCEDURE — 99285 EMERGENCY DEPT VISIT HI MDM: CPT

## 2022-01-01 PROCEDURE — 99239 HOSP IP/OBS DSCHRG MGMT >30: CPT

## 2022-01-01 PROCEDURE — 99231 SBSQ HOSP IP/OBS SF/LOW 25: CPT | Mod: GC

## 2022-01-01 PROCEDURE — 99214 OFFICE O/P EST MOD 30 MIN: CPT

## 2022-01-01 PROCEDURE — 99213 OFFICE O/P EST LOW 20 MIN: CPT | Mod: 25

## 2022-01-01 PROCEDURE — 99222 1ST HOSP IP/OBS MODERATE 55: CPT | Mod: GC

## 2022-01-01 PROCEDURE — 99053 MED SERV 10PM-8AM 24 HR FAC: CPT

## 2022-01-01 PROCEDURE — 94010 BREATHING CAPACITY TEST: CPT

## 2022-01-01 PROCEDURE — 99232 SBSQ HOSP IP/OBS MODERATE 35: CPT | Mod: GC

## 2022-01-01 PROCEDURE — 74177 CT ABD & PELVIS W/CONTRAST: CPT | Mod: 26

## 2022-01-01 PROCEDURE — 74174 CTA ABD&PLVS W/CONTRAST: CPT | Mod: 26

## 2022-01-01 PROCEDURE — 70450 CT HEAD/BRAIN W/O DYE: CPT | Mod: 26

## 2022-01-01 PROCEDURE — 93306 TTE W/DOPPLER COMPLETE: CPT | Mod: 26

## 2022-01-01 PROCEDURE — 82270 OCCULT BLOOD FECES: CPT

## 2022-01-01 RX ORDER — PANTOPRAZOLE SODIUM 20 MG/1
80 TABLET, DELAYED RELEASE ORAL ONCE
Refills: 0 | Status: COMPLETED | OUTPATIENT
Start: 2022-01-01 | End: 2022-01-01

## 2022-01-01 RX ORDER — CHOLECALCIFEROL (VITAMIN D3) 125 MCG
2000 CAPSULE ORAL DAILY
Refills: 0 | Status: DISCONTINUED | OUTPATIENT
Start: 2022-01-01 | End: 2022-01-01

## 2022-01-01 RX ORDER — ACETAMINOPHEN 500 MG
650 TABLET ORAL EVERY 6 HOURS
Refills: 0 | Status: DISCONTINUED | OUTPATIENT
Start: 2022-01-01 | End: 2022-01-01

## 2022-01-01 RX ORDER — REMDESIVIR 5 MG/ML
200 INJECTION INTRAVENOUS EVERY 24 HOURS
Refills: 0 | Status: COMPLETED | OUTPATIENT
Start: 2022-01-01 | End: 2022-01-01

## 2022-01-01 RX ORDER — APIXABAN 2.5 MG/1
1 TABLET, FILM COATED ORAL
Qty: 0 | Refills: 0 | DISCHARGE

## 2022-01-01 RX ORDER — MONTELUKAST 4 MG/1
10 TABLET, CHEWABLE ORAL AT BEDTIME
Refills: 0 | Status: DISCONTINUED | OUTPATIENT
Start: 2022-01-01 | End: 2022-01-01

## 2022-01-01 RX ORDER — SPIRONOLACTONE 25 MG/1
12.5 TABLET, FILM COATED ORAL DAILY
Refills: 0 | Status: DISCONTINUED | OUTPATIENT
Start: 2022-01-01 | End: 2022-01-01

## 2022-01-01 RX ORDER — FUROSEMIDE 40 MG
20 TABLET ORAL DAILY
Refills: 0 | Status: DISCONTINUED | OUTPATIENT
Start: 2022-01-01 | End: 2022-01-01

## 2022-01-01 RX ORDER — ALBUTEROL 90 UG/1
2 AEROSOL, METERED ORAL EVERY 6 HOURS
Refills: 0 | Status: DISCONTINUED | OUTPATIENT
Start: 2022-01-01 | End: 2022-01-01

## 2022-01-01 RX ORDER — ACETAMINOPHEN 500 MG
650 TABLET ORAL ONCE
Refills: 0 | Status: COMPLETED | OUTPATIENT
Start: 2022-01-01 | End: 2022-01-01

## 2022-01-01 RX ORDER — IRON SUCROSE 20 MG/ML
200 INJECTION, SOLUTION INTRAVENOUS ONCE
Refills: 0 | Status: COMPLETED | OUTPATIENT
Start: 2022-01-01 | End: 2022-01-01

## 2022-01-01 RX ORDER — ATORVASTATIN CALCIUM 80 MG/1
1 TABLET, FILM COATED ORAL
Qty: 0 | Refills: 0 | DISCHARGE
Start: 2022-01-01

## 2022-01-01 RX ORDER — REMDESIVIR 5 MG/ML
INJECTION INTRAVENOUS
Refills: 0 | Status: COMPLETED | OUTPATIENT
Start: 2022-01-01 | End: 2022-01-01

## 2022-01-01 RX ORDER — ASCORBIC ACID 60 MG
1 TABLET,CHEWABLE ORAL
Qty: 0 | Refills: 0 | DISCHARGE

## 2022-01-01 RX ORDER — GLYCOPYRROLATE 1 MG/1
1 TABLET ORAL
Qty: 90 | Refills: 5 | Status: ACTIVE | COMMUNITY
Start: 2021-03-23 | End: 1900-01-01

## 2022-01-01 RX ORDER — CARVEDILOL PHOSPHATE 80 MG/1
3.12 CAPSULE, EXTENDED RELEASE ORAL EVERY 12 HOURS
Refills: 0 | Status: DISCONTINUED | OUTPATIENT
Start: 2022-01-01 | End: 2022-01-01

## 2022-01-01 RX ORDER — TORSEMIDE 20 MG/1
20 TABLET ORAL
Refills: 0 | Status: ACTIVE | COMMUNITY

## 2022-01-01 RX ORDER — CEFPODOXIME PROXETIL 200 MG/1
200 TABLET, FILM COATED ORAL
Qty: 14 | Refills: 0 | Status: ACTIVE | COMMUNITY
Start: 2022-01-01 | End: 1900-01-01

## 2022-01-01 RX ORDER — AZITHROMYCIN 500 MG/1
500 TABLET, FILM COATED ORAL ONCE
Refills: 0 | Status: COMPLETED | OUTPATIENT
Start: 2022-01-01 | End: 2022-01-01

## 2022-01-01 RX ORDER — FUROSEMIDE 40 MG
20 TABLET ORAL
Qty: 0 | Refills: 0 | DISCHARGE
Start: 2022-01-01

## 2022-01-01 RX ORDER — MONTELUKAST 4 MG/1
10 TABLET, CHEWABLE ORAL DAILY
Refills: 0 | Status: DISCONTINUED | OUTPATIENT
Start: 2022-01-01 | End: 2022-01-01

## 2022-01-01 RX ORDER — BUDESONIDE AND FORMOTEROL FUMARATE DIHYDRATE 160; 4.5 UG/1; UG/1
2 AEROSOL RESPIRATORY (INHALATION)
Refills: 0 | Status: DISCONTINUED | OUTPATIENT
Start: 2022-01-01 | End: 2022-01-01

## 2022-01-01 RX ORDER — LACTULOSE 10 G/15ML
10 SOLUTION ORAL
Refills: 0 | Status: COMPLETED | OUTPATIENT
Start: 2022-01-01 | End: 2022-01-01

## 2022-01-01 RX ORDER — MIDODRINE HYDROCHLORIDE 2.5 MG/1
5 TABLET ORAL THREE TIMES A DAY
Refills: 0 | Status: DISCONTINUED | OUTPATIENT
Start: 2022-01-01 | End: 2022-01-01

## 2022-01-01 RX ORDER — LOSARTAN POTASSIUM 100 MG/1
25 TABLET, FILM COATED ORAL EVERY 24 HOURS
Refills: 0 | Status: DISCONTINUED | OUTPATIENT
Start: 2022-01-01 | End: 2022-01-01

## 2022-01-01 RX ORDER — LORATADINE 10 MG/1
10 TABLET ORAL DAILY
Refills: 0 | Status: DISCONTINUED | OUTPATIENT
Start: 2022-01-01 | End: 2022-01-01

## 2022-01-01 RX ORDER — REMDESIVIR 5 MG/ML
100 INJECTION INTRAVENOUS EVERY 24 HOURS
Refills: 0 | Status: COMPLETED | OUTPATIENT
Start: 2022-01-01 | End: 2022-01-01

## 2022-01-01 RX ORDER — MIDODRINE HYDROCHLORIDE 2.5 MG/1
1 TABLET ORAL
Qty: 0 | Refills: 0 | DISCHARGE
Start: 2022-01-01

## 2022-01-01 RX ORDER — HEPARIN SODIUM 5000 [USP'U]/ML
5000 INJECTION INTRAVENOUS; SUBCUTANEOUS EVERY 12 HOURS
Refills: 0 | Status: DISCONTINUED | OUTPATIENT
Start: 2022-01-01 | End: 2022-01-01

## 2022-01-01 RX ORDER — FUROSEMIDE 40 MG
40 TABLET ORAL DAILY
Refills: 0 | Status: DISCONTINUED | OUTPATIENT
Start: 2022-01-01 | End: 2022-01-01

## 2022-01-01 RX ORDER — AMIODARONE HYDROCHLORIDE 400 MG/1
400 TABLET ORAL EVERY 8 HOURS
Refills: 0 | Status: DISCONTINUED | OUTPATIENT
Start: 2022-01-01 | End: 2022-01-01

## 2022-01-01 RX ORDER — LOSARTAN POTASSIUM 25 MG/1
25 TABLET, FILM COATED ORAL
Refills: 0 | Status: DISCONTINUED | COMMUNITY
End: 2022-01-01

## 2022-01-01 RX ORDER — ASPIRIN/CALCIUM CARB/MAGNESIUM 324 MG
1 TABLET ORAL
Qty: 0 | Refills: 0 | DISCHARGE
Start: 2022-01-01

## 2022-01-01 RX ORDER — TAMSULOSIN HYDROCHLORIDE 0.4 MG/1
0.4 CAPSULE ORAL AT BEDTIME
Refills: 0 | Status: DISCONTINUED | OUTPATIENT
Start: 2022-01-01 | End: 2022-01-01

## 2022-01-01 RX ORDER — FUROSEMIDE 40 MG
20 TABLET ORAL ONCE
Refills: 0 | Status: COMPLETED | OUTPATIENT
Start: 2022-01-01 | End: 2022-01-01

## 2022-01-01 RX ORDER — FUROSEMIDE 40 MG
20 TABLET ORAL
Refills: 0 | Status: DISCONTINUED | OUTPATIENT
Start: 2022-01-01 | End: 2022-01-01

## 2022-01-01 RX ORDER — ROBINUL 0.2 MG/ML
1 INJECTION INTRAMUSCULAR; INTRAVENOUS
Refills: 0 | Status: DISCONTINUED | OUTPATIENT
Start: 2022-01-01 | End: 2022-01-01

## 2022-01-01 RX ORDER — FLUTICASONE PROPIONATE 220 MCG
0 AEROSOL WITH ADAPTER (GRAM) INHALATION
Qty: 0 | Refills: 0 | DISCHARGE

## 2022-01-01 RX ORDER — SPIRONOLACTONE 25 MG/1
12.5 TABLET, FILM COATED ORAL
Qty: 0 | Refills: 0 | DISCHARGE

## 2022-01-01 RX ORDER — LORATADINE 10 MG/1
1 TABLET ORAL
Qty: 0 | Refills: 0 | DISCHARGE

## 2022-01-01 RX ORDER — SODIUM CHLORIDE 9 MG/ML
500 INJECTION INTRAMUSCULAR; INTRAVENOUS; SUBCUTANEOUS ONCE
Refills: 0 | Status: DISCONTINUED | OUTPATIENT
Start: 2022-01-01 | End: 2022-01-01

## 2022-01-01 RX ORDER — ALFUZOSIN HYDROCHLORIDE 10 MG/1
10 TABLET, EXTENDED RELEASE ORAL
Qty: 30 | Refills: 11 | Status: DISCONTINUED | COMMUNITY
Start: 2021-12-17 | End: 2022-01-01

## 2022-01-01 RX ORDER — MAGNESIUM SULFATE 500 MG/ML
2 VIAL (ML) INJECTION ONCE
Refills: 0 | Status: COMPLETED | OUTPATIENT
Start: 2022-01-01 | End: 2022-01-01

## 2022-01-01 RX ORDER — FLUTICASONE FUROATE, UMECLIDINIUM BROMIDE AND VILANTEROL TRIFENATATE 100; 62.5; 25 UG/1; UG/1; UG/1
100-62.5-25 POWDER RESPIRATORY (INHALATION) DAILY
Qty: 3 | Refills: 2 | Status: ACTIVE | COMMUNITY
Start: 2021-07-17 | End: 1900-01-01

## 2022-01-01 RX ORDER — CEFTRIAXONE 500 MG/1
1000 INJECTION, POWDER, FOR SOLUTION INTRAMUSCULAR; INTRAVENOUS ONCE
Refills: 0 | Status: COMPLETED | OUTPATIENT
Start: 2022-01-01 | End: 2022-01-01

## 2022-01-01 RX ORDER — DICYCLOMINE HYDROCHLORIDE 10 MG/1
10 CAPSULE ORAL 3 TIMES DAILY
Qty: 90 | Refills: 3 | Status: DISCONTINUED | COMMUNITY
Start: 2022-01-01 | End: 2022-01-01

## 2022-01-01 RX ORDER — ASPIRIN/CALCIUM CARB/MAGNESIUM 324 MG
81 TABLET ORAL DAILY
Refills: 0 | Status: DISCONTINUED | OUTPATIENT
Start: 2022-01-01 | End: 2022-01-01

## 2022-01-01 RX ORDER — ASCORBIC ACID 60 MG
500 TABLET,CHEWABLE ORAL DAILY
Refills: 0 | Status: DISCONTINUED | OUTPATIENT
Start: 2022-01-01 | End: 2022-01-01

## 2022-01-01 RX ORDER — ATORVASTATIN CALCIUM 80 MG/1
20 TABLET, FILM COATED ORAL AT BEDTIME
Refills: 0 | Status: DISCONTINUED | OUTPATIENT
Start: 2022-01-01 | End: 2022-01-01

## 2022-01-01 RX ORDER — ROBINUL 0.2 MG/ML
1 INJECTION INTRAMUSCULAR; INTRAVENOUS
Qty: 0 | Refills: 0 | DISCHARGE

## 2022-01-01 RX ORDER — FLUTICASONE FUROATE, UMECLIDINIUM BROMIDE AND VILANTEROL TRIFENATATE 200; 62.5; 25 UG/1; UG/1; UG/1
1 POWDER RESPIRATORY (INHALATION)
Qty: 0 | Refills: 0 | DISCHARGE

## 2022-01-01 RX ORDER — AMIODARONE HYDROCHLORIDE 400 MG/1
TABLET ORAL
Refills: 0 | Status: DISCONTINUED | OUTPATIENT
Start: 2022-01-01 | End: 2022-01-01

## 2022-01-01 RX ORDER — CARVEDILOL PHOSPHATE 80 MG/1
1 CAPSULE, EXTENDED RELEASE ORAL
Qty: 0 | Refills: 0 | DISCHARGE

## 2022-01-01 RX ORDER — AMOXICILLIN AND CLAVULANATE POTASSIUM 875; 125 MG/1; MG/1
875-125 TABLET, COATED ORAL
Qty: 4 | Refills: 0 | Status: ACTIVE | COMMUNITY
Start: 2022-01-01 | End: 1900-01-01

## 2022-01-01 RX ORDER — ALFUZOSIN HYDROCHLORIDE 10 MG/1
1 TABLET, EXTENDED RELEASE ORAL
Qty: 0 | Refills: 0 | DISCHARGE

## 2022-01-01 RX ORDER — CEFTRIAXONE 500 MG/1
1000 INJECTION, POWDER, FOR SOLUTION INTRAMUSCULAR; INTRAVENOUS EVERY 24 HOURS
Refills: 0 | Status: DISCONTINUED | OUTPATIENT
Start: 2022-01-01 | End: 2022-01-01

## 2022-01-01 RX ORDER — AMIODARONE HYDROCHLORIDE 400 MG/1
200 TABLET ORAL DAILY
Refills: 0 | Status: DISCONTINUED | OUTPATIENT
Start: 2022-01-01 | End: 2022-01-01

## 2022-01-01 RX ORDER — CEFTRIAXONE 500 MG/1
2 INJECTION, POWDER, FOR SOLUTION INTRAMUSCULAR; INTRAVENOUS
Qty: 0 | Refills: 0 | DISCHARGE
Start: 2022-01-01

## 2022-01-01 RX ORDER — FLUTICASONE PROPIONATE 50 MCG
1 SPRAY, SUSPENSION NASAL
Refills: 0 | Status: DISCONTINUED | OUTPATIENT
Start: 2022-01-01 | End: 2022-01-01

## 2022-01-01 RX ORDER — MONTELUKAST 4 MG/1
1 TABLET, CHEWABLE ORAL
Qty: 0 | Refills: 0 | DISCHARGE

## 2022-01-01 RX ORDER — LOSARTAN POTASSIUM 100 MG/1
1 TABLET, FILM COATED ORAL
Qty: 0 | Refills: 0 | DISCHARGE

## 2022-01-01 RX ORDER — CEFTRIAXONE 500 MG/1
2000 INJECTION, POWDER, FOR SOLUTION INTRAMUSCULAR; INTRAVENOUS EVERY 24 HOURS
Refills: 0 | Status: DISCONTINUED | OUTPATIENT
Start: 2022-01-01 | End: 2022-01-01

## 2022-01-01 RX ORDER — AMIODARONE HYDROCHLORIDE 400 MG/1
1 TABLET ORAL
Qty: 0 | Refills: 0 | DISCHARGE
Start: 2022-01-01

## 2022-01-01 RX ADMIN — Medication 20 MILLIGRAM(S): at 13:34

## 2022-01-01 RX ADMIN — Medication 2000 UNIT(S): at 09:20

## 2022-01-01 RX ADMIN — LOSARTAN POTASSIUM 25 MILLIGRAM(S): 100 TABLET, FILM COATED ORAL at 21:09

## 2022-01-01 RX ADMIN — Medication 25 GRAM(S): at 01:09

## 2022-01-01 RX ADMIN — CEFTRIAXONE 100 MILLIGRAM(S): 500 INJECTION, POWDER, FOR SOLUTION INTRAMUSCULAR; INTRAVENOUS at 11:19

## 2022-01-01 RX ADMIN — REMDESIVIR 500 MILLIGRAM(S): 5 INJECTION INTRAVENOUS at 13:26

## 2022-01-01 RX ADMIN — MONTELUKAST 10 MILLIGRAM(S): 4 TABLET, CHEWABLE ORAL at 21:08

## 2022-01-01 RX ADMIN — Medication 2000 UNIT(S): at 12:00

## 2022-01-01 RX ADMIN — Medication 650 MILLIGRAM(S): at 21:53

## 2022-01-01 RX ADMIN — ATORVASTATIN CALCIUM 20 MILLIGRAM(S): 80 TABLET, FILM COATED ORAL at 20:43

## 2022-01-01 RX ADMIN — LORATADINE 10 MILLIGRAM(S): 10 TABLET ORAL at 10:18

## 2022-01-01 RX ADMIN — AMIODARONE HYDROCHLORIDE 400 MILLIGRAM(S): 400 TABLET ORAL at 21:24

## 2022-01-01 RX ADMIN — BUDESONIDE AND FORMOTEROL FUMARATE DIHYDRATE 2 PUFF(S): 160; 4.5 AEROSOL RESPIRATORY (INHALATION) at 10:38

## 2022-01-01 RX ADMIN — REMDESIVIR 500 MILLIGRAM(S): 5 INJECTION INTRAVENOUS at 18:19

## 2022-01-01 RX ADMIN — LORATADINE 10 MILLIGRAM(S): 10 TABLET ORAL at 12:41

## 2022-01-01 RX ADMIN — CEFTRIAXONE 100 MILLIGRAM(S): 500 INJECTION, POWDER, FOR SOLUTION INTRAMUSCULAR; INTRAVENOUS at 10:20

## 2022-01-01 RX ADMIN — BUDESONIDE AND FORMOTEROL FUMARATE DIHYDRATE 2 PUFF(S): 160; 4.5 AEROSOL RESPIRATORY (INHALATION) at 08:47

## 2022-01-01 RX ADMIN — CEFTRIAXONE 100 MILLIGRAM(S): 500 INJECTION, POWDER, FOR SOLUTION INTRAMUSCULAR; INTRAVENOUS at 11:36

## 2022-01-01 RX ADMIN — BUDESONIDE AND FORMOTEROL FUMARATE DIHYDRATE 2 PUFF(S): 160; 4.5 AEROSOL RESPIRATORY (INHALATION) at 10:18

## 2022-01-01 RX ADMIN — CEFTRIAXONE 100 MILLIGRAM(S): 500 INJECTION, POWDER, FOR SOLUTION INTRAMUSCULAR; INTRAVENOUS at 09:34

## 2022-01-01 RX ADMIN — MONTELUKAST 10 MILLIGRAM(S): 4 TABLET, CHEWABLE ORAL at 22:09

## 2022-01-01 RX ADMIN — AMIODARONE HYDROCHLORIDE 400 MILLIGRAM(S): 400 TABLET ORAL at 21:28

## 2022-01-01 RX ADMIN — Medication 1 DROP(S): at 17:26

## 2022-01-01 RX ADMIN — CEFTRIAXONE 100 MILLIGRAM(S): 500 INJECTION, POWDER, FOR SOLUTION INTRAMUSCULAR; INTRAVENOUS at 11:22

## 2022-01-01 RX ADMIN — LORATADINE 10 MILLIGRAM(S): 10 TABLET ORAL at 09:20

## 2022-01-01 RX ADMIN — ROBINUL 1 MILLIGRAM(S): 0.2 INJECTION INTRAMUSCULAR; INTRAVENOUS at 05:53

## 2022-01-01 RX ADMIN — BUDESONIDE AND FORMOTEROL FUMARATE DIHYDRATE 2 PUFF(S): 160; 4.5 AEROSOL RESPIRATORY (INHALATION) at 21:22

## 2022-01-01 RX ADMIN — Medication 20 MILLIGRAM(S): at 05:07

## 2022-01-01 RX ADMIN — LACTULOSE 10 GRAM(S): 10 SOLUTION ORAL at 05:25

## 2022-01-01 RX ADMIN — LORATADINE 10 MILLIGRAM(S): 10 TABLET ORAL at 13:25

## 2022-01-01 RX ADMIN — ATORVASTATIN CALCIUM 20 MILLIGRAM(S): 80 TABLET, FILM COATED ORAL at 21:28

## 2022-01-01 RX ADMIN — Medication 20 MILLIGRAM(S): at 12:04

## 2022-01-01 RX ADMIN — MONTELUKAST 10 MILLIGRAM(S): 4 TABLET, CHEWABLE ORAL at 21:09

## 2022-01-01 RX ADMIN — TAMSULOSIN HYDROCHLORIDE 0.4 MILLIGRAM(S): 0.4 CAPSULE ORAL at 21:22

## 2022-01-01 RX ADMIN — Medication 500 MILLIGRAM(S): at 09:20

## 2022-01-01 RX ADMIN — Medication 1 SPRAY(S): at 18:22

## 2022-01-01 RX ADMIN — MIDODRINE HYDROCHLORIDE 5 MILLIGRAM(S): 2.5 TABLET ORAL at 18:33

## 2022-01-01 RX ADMIN — LACTULOSE 10 GRAM(S): 10 SOLUTION ORAL at 17:05

## 2022-01-01 RX ADMIN — HEPARIN SODIUM 5000 UNIT(S): 5000 INJECTION INTRAVENOUS; SUBCUTANEOUS at 18:25

## 2022-01-01 RX ADMIN — Medication 81 MILLIGRAM(S): at 11:45

## 2022-01-01 RX ADMIN — Medication 500 MILLIGRAM(S): at 12:00

## 2022-01-01 RX ADMIN — Medication 650 MILLIGRAM(S): at 05:47

## 2022-01-01 RX ADMIN — BUDESONIDE AND FORMOTEROL FUMARATE DIHYDRATE 2 PUFF(S): 160; 4.5 AEROSOL RESPIRATORY (INHALATION) at 20:51

## 2022-01-01 RX ADMIN — HEPARIN SODIUM 5000 UNIT(S): 5000 INJECTION INTRAVENOUS; SUBCUTANEOUS at 05:07

## 2022-01-01 RX ADMIN — CARVEDILOL PHOSPHATE 3.12 MILLIGRAM(S): 80 CAPSULE, EXTENDED RELEASE ORAL at 21:21

## 2022-01-01 RX ADMIN — Medication 500 MILLIGRAM(S): at 10:18

## 2022-01-01 RX ADMIN — LACTULOSE 10 GRAM(S): 10 SOLUTION ORAL at 18:26

## 2022-01-01 RX ADMIN — Medication 20 MILLIGRAM(S): at 13:25

## 2022-01-01 RX ADMIN — Medication 2000 UNIT(S): at 11:36

## 2022-01-01 RX ADMIN — MONTELUKAST 10 MILLIGRAM(S): 4 TABLET, CHEWABLE ORAL at 20:56

## 2022-01-01 RX ADMIN — Medication 2000 UNIT(S): at 13:25

## 2022-01-01 RX ADMIN — REMDESIVIR 500 MILLIGRAM(S): 5 INJECTION INTRAVENOUS at 21:22

## 2022-01-01 RX ADMIN — CARVEDILOL PHOSPHATE 3.12 MILLIGRAM(S): 80 CAPSULE, EXTENDED RELEASE ORAL at 05:42

## 2022-01-01 RX ADMIN — HEPARIN SODIUM 5000 UNIT(S): 5000 INJECTION INTRAVENOUS; SUBCUTANEOUS at 18:38

## 2022-01-01 RX ADMIN — BUDESONIDE AND FORMOTEROL FUMARATE DIHYDRATE 2 PUFF(S): 160; 4.5 AEROSOL RESPIRATORY (INHALATION) at 09:19

## 2022-01-01 RX ADMIN — REMDESIVIR 500 MILLIGRAM(S): 5 INJECTION INTRAVENOUS at 19:09

## 2022-01-01 RX ADMIN — Medication 1 DROP(S): at 18:23

## 2022-01-01 RX ADMIN — MIDODRINE HYDROCHLORIDE 5 MILLIGRAM(S): 2.5 TABLET ORAL at 12:04

## 2022-01-01 RX ADMIN — Medication 20 MILLIGRAM(S): at 17:04

## 2022-01-01 RX ADMIN — HEPARIN SODIUM 5000 UNIT(S): 5000 INJECTION INTRAVENOUS; SUBCUTANEOUS at 18:33

## 2022-01-01 RX ADMIN — Medication 1 SPRAY(S): at 21:22

## 2022-01-01 RX ADMIN — HEPARIN SODIUM 5000 UNIT(S): 5000 INJECTION INTRAVENOUS; SUBCUTANEOUS at 17:45

## 2022-01-01 RX ADMIN — ROBINUL 1 MILLIGRAM(S): 0.2 INJECTION INTRAMUSCULAR; INTRAVENOUS at 17:28

## 2022-01-01 RX ADMIN — AMIODARONE HYDROCHLORIDE 400 MILLIGRAM(S): 400 TABLET ORAL at 06:11

## 2022-01-01 RX ADMIN — TAMSULOSIN HYDROCHLORIDE 0.4 MILLIGRAM(S): 0.4 CAPSULE ORAL at 21:08

## 2022-01-01 RX ADMIN — PANTOPRAZOLE SODIUM 80 MILLIGRAM(S): 20 TABLET, DELAYED RELEASE ORAL at 19:02

## 2022-01-01 RX ADMIN — Medication 81 MILLIGRAM(S): at 14:06

## 2022-01-01 RX ADMIN — SPIRONOLACTONE 12.5 MILLIGRAM(S): 25 TABLET, FILM COATED ORAL at 12:41

## 2022-01-01 RX ADMIN — Medication 20 MILLIGRAM(S): at 05:27

## 2022-01-01 RX ADMIN — Medication 1 SPRAY(S): at 17:28

## 2022-01-01 RX ADMIN — HEPARIN SODIUM 5000 UNIT(S): 5000 INJECTION INTRAVENOUS; SUBCUTANEOUS at 06:11

## 2022-01-01 RX ADMIN — CARVEDILOL PHOSPHATE 3.12 MILLIGRAM(S): 80 CAPSULE, EXTENDED RELEASE ORAL at 17:29

## 2022-01-01 RX ADMIN — LORATADINE 10 MILLIGRAM(S): 10 TABLET ORAL at 12:00

## 2022-01-01 RX ADMIN — TAMSULOSIN HYDROCHLORIDE 0.4 MILLIGRAM(S): 0.4 CAPSULE ORAL at 22:09

## 2022-01-01 RX ADMIN — MIDODRINE HYDROCHLORIDE 5 MILLIGRAM(S): 2.5 TABLET ORAL at 14:07

## 2022-01-01 RX ADMIN — Medication 2000 UNIT(S): at 10:18

## 2022-01-01 RX ADMIN — CARVEDILOL PHOSPHATE 3.12 MILLIGRAM(S): 80 CAPSULE, EXTENDED RELEASE ORAL at 17:10

## 2022-01-01 RX ADMIN — ROBINUL 1 MILLIGRAM(S): 0.2 INJECTION INTRAMUSCULAR; INTRAVENOUS at 21:22

## 2022-01-01 RX ADMIN — CEFTRIAXONE 100 MILLIGRAM(S): 500 INJECTION, POWDER, FOR SOLUTION INTRAMUSCULAR; INTRAVENOUS at 06:21

## 2022-01-01 RX ADMIN — LOSARTAN POTASSIUM 25 MILLIGRAM(S): 100 TABLET, FILM COATED ORAL at 21:22

## 2022-01-01 RX ADMIN — Medication 81 MILLIGRAM(S): at 13:34

## 2022-01-01 RX ADMIN — AMIODARONE HYDROCHLORIDE 400 MILLIGRAM(S): 400 TABLET ORAL at 22:57

## 2022-01-01 RX ADMIN — CEFTRIAXONE 100 MILLIGRAM(S): 500 INJECTION, POWDER, FOR SOLUTION INTRAMUSCULAR; INTRAVENOUS at 09:42

## 2022-01-01 RX ADMIN — Medication 20 MILLIGRAM(S): at 19:01

## 2022-01-01 RX ADMIN — Medication 1 SPRAY(S): at 05:37

## 2022-01-01 RX ADMIN — HEPARIN SODIUM 5000 UNIT(S): 5000 INJECTION INTRAVENOUS; SUBCUTANEOUS at 05:53

## 2022-01-01 RX ADMIN — SPIRONOLACTONE 12.5 MILLIGRAM(S): 25 TABLET, FILM COATED ORAL at 05:42

## 2022-01-01 RX ADMIN — Medication 81 MILLIGRAM(S): at 12:04

## 2022-01-01 RX ADMIN — IRON SUCROSE 200 MILLIGRAM(S): 20 INJECTION, SOLUTION INTRAVENOUS at 13:26

## 2022-01-01 RX ADMIN — Medication 20 MILLIGRAM(S): at 05:41

## 2022-01-01 RX ADMIN — Medication 500 MILLIGRAM(S): at 11:36

## 2022-01-01 RX ADMIN — BUDESONIDE AND FORMOTEROL FUMARATE DIHYDRATE 2 PUFF(S): 160; 4.5 AEROSOL RESPIRATORY (INHALATION) at 22:09

## 2022-01-01 RX ADMIN — AZITHROMYCIN 255 MILLIGRAM(S): 500 TABLET, FILM COATED ORAL at 08:59

## 2022-01-01 RX ADMIN — AMIODARONE HYDROCHLORIDE 400 MILLIGRAM(S): 400 TABLET ORAL at 12:04

## 2022-01-01 RX ADMIN — LORATADINE 10 MILLIGRAM(S): 10 TABLET ORAL at 11:36

## 2022-01-01 RX ADMIN — REMDESIVIR 500 MILLIGRAM(S): 5 INJECTION INTRAVENOUS at 17:26

## 2022-01-01 RX ADMIN — ROBINUL 1 MILLIGRAM(S): 0.2 INJECTION INTRAMUSCULAR; INTRAVENOUS at 18:22

## 2022-01-01 RX ADMIN — HEPARIN SODIUM 5000 UNIT(S): 5000 INJECTION INTRAVENOUS; SUBCUTANEOUS at 17:04

## 2022-01-01 RX ADMIN — BUDESONIDE AND FORMOTEROL FUMARATE DIHYDRATE 2 PUFF(S): 160; 4.5 AEROSOL RESPIRATORY (INHALATION) at 21:09

## 2022-01-01 RX ADMIN — ATORVASTATIN CALCIUM 20 MILLIGRAM(S): 80 TABLET, FILM COATED ORAL at 21:24

## 2022-01-01 RX ADMIN — TAMSULOSIN HYDROCHLORIDE 0.4 MILLIGRAM(S): 0.4 CAPSULE ORAL at 20:55

## 2022-01-01 RX ADMIN — LACTULOSE 10 GRAM(S): 10 SOLUTION ORAL at 05:08

## 2022-01-01 RX ADMIN — Medication 20 MILLIGRAM(S): at 14:07

## 2022-01-01 RX ADMIN — MIDODRINE HYDROCHLORIDE 5 MILLIGRAM(S): 2.5 TABLET ORAL at 05:28

## 2022-01-01 RX ADMIN — Medication 1 SPRAY(S): at 05:54

## 2022-01-01 RX ADMIN — Medication 20 MILLIGRAM(S): at 05:53

## 2022-01-01 RX ADMIN — AMIODARONE HYDROCHLORIDE 400 MILLIGRAM(S): 400 TABLET ORAL at 05:27

## 2022-01-01 RX ADMIN — AMIODARONE HYDROCHLORIDE 400 MILLIGRAM(S): 400 TABLET ORAL at 05:08

## 2022-01-01 RX ADMIN — HEPARIN SODIUM 5000 UNIT(S): 5000 INJECTION INTRAVENOUS; SUBCUTANEOUS at 05:24

## 2022-01-01 RX ADMIN — Medication 20 MILLIGRAM(S): at 05:25

## 2022-01-01 RX ADMIN — Medication 2000 UNIT(S): at 12:41

## 2022-01-01 RX ADMIN — Medication 20 MILLIGRAM(S): at 14:26

## 2022-01-01 RX ADMIN — Medication 20 MILLIGRAM(S): at 06:11

## 2022-01-01 RX ADMIN — Medication 20 MILLIGRAM(S): at 17:28

## 2022-01-01 RX ADMIN — MONTELUKAST 10 MILLIGRAM(S): 4 TABLET, CHEWABLE ORAL at 21:22

## 2022-01-01 RX ADMIN — LOSARTAN POTASSIUM 25 MILLIGRAM(S): 100 TABLET, FILM COATED ORAL at 17:30

## 2022-01-01 RX ADMIN — Medication 650 MILLIGRAM(S): at 21:23

## 2022-01-01 RX ADMIN — TAMSULOSIN HYDROCHLORIDE 0.4 MILLIGRAM(S): 0.4 CAPSULE ORAL at 21:09

## 2022-01-01 RX ADMIN — AMIODARONE HYDROCHLORIDE 400 MILLIGRAM(S): 400 TABLET ORAL at 14:06

## 2022-01-01 RX ADMIN — Medication 20 MILLIGRAM(S): at 09:20

## 2022-01-01 RX ADMIN — CARVEDILOL PHOSPHATE 3.12 MILLIGRAM(S): 80 CAPSULE, EXTENDED RELEASE ORAL at 21:09

## 2022-01-01 RX ADMIN — LOSARTAN POTASSIUM 25 MILLIGRAM(S): 100 TABLET, FILM COATED ORAL at 17:10

## 2022-01-01 RX ADMIN — Medication 500 MILLIGRAM(S): at 12:41

## 2022-01-01 RX ADMIN — BUDESONIDE AND FORMOTEROL FUMARATE DIHYDRATE 2 PUFF(S): 160; 4.5 AEROSOL RESPIRATORY (INHALATION) at 10:10

## 2022-01-01 RX ADMIN — Medication 500 MILLIGRAM(S): at 13:25

## 2022-01-01 RX ADMIN — MIDODRINE HYDROCHLORIDE 5 MILLIGRAM(S): 2.5 TABLET ORAL at 17:05

## 2022-01-01 RX ADMIN — Medication 81 MILLIGRAM(S): at 11:20

## 2022-01-01 RX ADMIN — ROBINUL 1 MILLIGRAM(S): 0.2 INJECTION INTRAMUSCULAR; INTRAVENOUS at 05:37

## 2022-01-01 RX ADMIN — HEPARIN SODIUM 5000 UNIT(S): 5000 INJECTION INTRAVENOUS; SUBCUTANEOUS at 05:27

## 2022-01-04 ENCOUNTER — NON-APPOINTMENT (OUTPATIENT)
Age: 87
End: 2022-01-04

## 2022-01-11 RX ORDER — BUDESONIDE, GLYCOPYRROLATE, AND FORMOTEROL FUMARATE 160; 9; 4.8 UG/1; UG/1; UG/1
160-9-4.8 AEROSOL, METERED RESPIRATORY (INHALATION)
Qty: 1 | Refills: 5 | Status: ACTIVE | COMMUNITY
Start: 2022-01-11 | End: 1900-01-01

## 2022-03-11 NOTE — ASSESSMENT
[FreeTextEntry1] : Overall picture appears to be that of worsening congestive heart failure likely secondary to noncompliance with diuretics.  Resume diuretic therapy and follow-up x-ray short-term.  Also has anemia which needs to be addressed.  Do not have prior records regarding this issue.  Will reach out to patient to discuss further

## 2022-03-11 NOTE — HISTORY OF PRESENT ILLNESS
[TextBox_4] : Increasing shortness of breath with exertion\par Has oxygen for nighttime and POC\par \par Significant increase oxygen use.  Has not been using diuretics regularly because of need to get up to urinate and need to remain near her house.

## 2022-04-01 NOTE — ASSESSMENT
[FreeTextEntry1] : Overall picture appears to be that of worsening congestive heart failure likely secondary to noncompliance with diuretics.  Much improved with resumption of diuretic plus transfusion.  Follow-up in 3 months continue Trelegy samples provided

## 2022-04-01 NOTE — HISTORY OF PRESENT ILLNESS
[TextBox_4] : Prior: Increasing shortness of breath with exertion\par Has oxygen for nighttime and POC\par \par Significant increase oxygen use.  Has not been using diuretics regularly because of need to get up to urinate and need to remain near her house.\par \par Current: 15 pounds down with diuretics.  Received transfusion\par \par Feels much better

## 2022-08-01 PROBLEM — I50.9 CONGESTIVE HEART FAILURE: Status: ACTIVE | Noted: 2019-12-19

## 2022-08-01 PROBLEM — R10.32 LEFT INGUINAL PAIN: Status: ACTIVE | Noted: 2017-07-21

## 2022-08-01 NOTE — ASSESSMENT
[FreeTextEntry1] : Mr. Morse  is an 87-year-old male with several significant comorbid conditions.  The  patient has both cardiac and pulmonary disease and is on anticoagulation.  From the gastrointestinal perspective the patient does have a history of reflux which does not seem to be problematic at the present time.  His appetite is fair but he has lost significant amounts of weight.  There is no dysphagia or nocturnal regurgitation.  The patient's bowel movements are sometimes incomplete with a tenacious looser stool.  He has been on an antispasmodic for many years due to his underlying diverticular spasm.  He has  not demonstrated any significant anticholinergic effects including any possible urinary symptoms.  The patient will be switched over to a different low-dose antispasmodic since the glycopyrrolate is not helping him.  The patient was told to resume soluble fiber.  The patient's stool is strongly guaiac positive and the differential diagnosis does include a significant intracolonic pathology.  The patient had a colonoscopy done 9 years ago and is not a suitable candidate to repeat this exam.  I did recommend a CAT scan of the abdomen and pelvis to assure that there is no significant intra-abdominal pathology .  Patient is not receptive to this suggestion at the present time.  The patient did have bleeding from his gums for several days but I doubt that this is the sole cause of the blood in his stool.  Cachorro  will get back to me with a progress report in 10 days.

## 2022-08-01 NOTE — HISTORY OF PRESENT ILLNESS
[FreeTextEntry1] : I saw patient Cachorro Morse in the office today.  The patient is an 87-year-old male who has a history of congestive heart failure coronary artery disease, hypertension and COPD.  From the gastrointestinal perspective the patient has been treated over the years for gastroesophageal reflux disease as well as irritable bowel and diverticulosis.  The patient has had colonoscopies in his past and the last was approximately 9 years ago.  At that time the patient had a non-adenomatous polyp.  The patient does have known diverticulosis.  Cachorro complains of a sensation of incomplete evacuation with tenacious stools and lower abdominal cramping.  The patient has had significant weight loss but he blames this on his underlying COPD.  He does use oxygen at night.  He does not notice any gross blood in the stool but is recently been evaluated and treated for anemia.  The patient was given iron infusions and 1 blood transfusion but notices that he has not had a remarkable response to this treatment.  The patient does remain weak.  The patient has a known history of urinary issues.  Patient has been on an antispasmodic for a number of years with no adverse urological issues.  Cachorro does not smoke, does not abuse caffeine or ethanol.  Patient is currently on Eliquis and does admit to bleeding at times from his gums.  The patient stopped taking soluble fiber supplement since he stated that it was too expensive.  A bone marrow exam was recommended by the hematologist but the patient refused.

## 2022-08-01 NOTE — PHYSICAL EXAM
[General Appearance - Alert] : alert [General Appearance - In No Acute Distress] : in no acute distress [Respiration, Rhythm And Depth] : normal respiratory rhythm and effort [Apical Impulse] : the apical impulse was normal [Heart Rate And Rhythm] : heart rate was normal and rhythm regular [FreeTextEntry1] : There is a harsh grade 2/6 systolic murmur. [Bowel Sounds] : normal bowel sounds [Abdomen Soft] : soft [Abdomen Tenderness] : non-tender [] : no hepato-splenomegaly [Normal Sphincter Tone] : normal sphincter tone [No Rectal Mass] : no rectal mass [Occult Blood Positive] : stool positive for occult blood

## 2022-08-01 NOTE — REVIEW OF SYSTEMS
[Feeling Poorly] : feeling poorly [Feeling Tired] : feeling tired [Recent Weight Loss (___ Lbs)] : recent [unfilled] ~Ulb weight loss [As noted in HPI] : as noted in HPI [Shortness Of Breath] : shortness of breath [SOB on Exertion] : shortness of breath during exertion [Abdominal Pain] : abdominal pain [Heartburn] : heartburn [As Noted in HPI] : as noted in HPI

## 2022-10-21 PROBLEM — K58.9 IRRITABLE BOWEL SYNDROME: Status: ACTIVE | Noted: 2021-03-22

## 2022-10-21 PROBLEM — D64.9 ANEMIA: Status: ACTIVE | Noted: 2022-01-01

## 2022-10-21 NOTE — ASSESSMENT
[FreeTextEntry1] : Cachorro is an 87-year-old male who has multiple significant comorbid conditions.  Had a recent endovascular repair of an abdominal aortic aneurysm.  The patient is also under treatment for congestive heart failure.  Theoretically the patient does require repeat colonoscopy with the attempt of endoscopic removal of the large polyps that were not addressed in the transverse colon at [facility] [place].  The procedure will require endoscopic mucosal resection with likely a large defect in the colonic mucosa.  Patient will need to hold his anticoagulation for an extended period of time and I will need to reach out to the cardiologist to see if this is technically feasible.  The patient also underwent an endovascular repair of a aneurysm and we will need to decide what the appropriate timing is for scheduling such a procedure.  We will be reaching out to the patient's cardiologist for further input.  Corine will report back to me any acute changes in his gastrointestinal status.

## 2022-10-21 NOTE — HISTORY OF PRESENT ILLNESS
[de-identified] : The patient had an upper endoscopy done which did not reveal any significant upper GI bleeding. [FreeTextEntry1] : Colonoscopy done at [facility] in August 2022 which revealed multiple large polyps.

## 2022-10-21 NOTE — PHYSICAL EXAM
[Alert] : alert [No Acute Distress] : no acute distress [No Respiratory Distress] : no respiratory distress [de-identified] : There was scant bibasilar crepitus. [de-identified] : Heart rate appeared to be regular today. [de-identified] : The abdomen was soft with positive bowel sounds.

## 2022-10-21 NOTE — REVIEW OF SYSTEMS
[Fever] : no fever [Chills] : no chills [Feeling Tired] : feeling tired [SOB on Exertion] : shortness of breath during exertion [As Noted in HPI] : as noted in HPI [FreeTextEntry7] : History of GI bleeding with multiple colon polyps.

## 2022-10-28 NOTE — PHYSICAL EXAM
[General Appearance - Well Developed] : well developed [General Appearance - Well Nourished] : well nourished [Normal Appearance] : normal appearance [Well Groomed] : well groomed [General Appearance - In No Acute Distress] : no acute distress [Abdomen Soft] : soft [Abdomen Tenderness] : non-tender [Urinary Bladder Findings] : the bladder was normal on palpation [Edema] : no peripheral edema [] : no respiratory distress [Respiration, Rhythm And Depth] : normal respiratory rhythm and effort [Exaggerated Use Of Accessory Muscles For Inspiration] : no accessory muscle use [Oriented To Time, Place, And Person] : oriented to person, place, and time [Affect] : the affect was normal [Mood] : the mood was normal [Not Anxious] : not anxious [No Focal Deficits] : no focal deficits

## 2022-11-06 PROBLEM — J44.9 CHRONIC OBSTRUCTIVE PULMONARY DISEASE, UNSPECIFIED COPD TYPE: Status: ACTIVE | Noted: 2019-12-19

## 2022-11-06 PROBLEM — R06.02 CHRONIC SHORTNESS OF BREATH: Status: ACTIVE | Noted: 2022-01-01

## 2022-11-06 NOTE — HISTORY OF PRESENT ILLNESS
[TextBox_4] : History of congestive heart failure and COPD currently on Trelegy inhaler.  Status post hospitalization for endovascular AAA repair.  Postop had issues with urinary retention which appears to have resolved.  Chronic shortness of breath interval change.

## 2022-11-06 NOTE — ASSESSMENT
[FreeTextEntry1] : Combination of CHF and COPD status post endovascular AAA repair recent history of urinary retention.  Significant interval improvement on inhalers though unclear how much this reflects treatment of CHF or COPD we will reach out to urology to see if current inhaler is relevant to his urology issues

## 2022-11-14 PROBLEM — R31.0 GROSS HEMATURIA: Status: ACTIVE | Noted: 2017-01-24

## 2022-11-14 NOTE — HISTORY OF PRESENT ILLNESS
[FreeTextEntry1] : 86yo gentleman with cc of elevated PSA and hematuria. Pt with hx of TURP x3, last in 2016. At baseline, he reports some spraying of urinary stream. Nocturia 2x on average. No urinary incontinence. No feelings of incomplete emptying but he has feelings of needing to go soon after. No longer on any BPH meds. No significant bother. No issues with UTIs. Pt on eliquis for afib. He was previously on coumadin. He has had issues in the past with gross hematuria. He was switched to eliquis to aid in decreasing risk of hematuria. Minimal hematuria since switch to eliquis. \par \par He has noted a slow increase in nocturia. A year ago he was getting up 2-3 times. Now its more (3-4) but has been this way for the last year. He was trialed on meds with tamsulosin but did not feel it helped. Tried alfuzosin and same. He does not have a lot of bother at this point. \par \par In 2019 pt went to see PCP and PSA was checked and noted to be elevated at 6.4. Pt remembers it last being 4.9 in 2015. Sisters son with prostate ca dx 62yo and tx with RT. Plan was made for eval with prostate sizing to calculate density and further stratify risk. Prostate last measured as 74cc. This corresponds to a density of 0.09. This is very reassuring that increased PSA is a function of increased prostate volume. \par \par Pt reports that since last visit, had endovascular AAA repair and was in the hospital for 9d. He had CT, colonoscopy and endoscopy all during visit for what sounds like anemia. He reports during hospitalization they had issue with vazquez placement. Developed gross hematuria. He was discharged with vazquez in place but had dislodgement and ended up going back and having vazquez removed. His urine has been clear since then. Pt still regaining strength and trying to gain some weight.

## 2022-11-14 NOTE — ASSESSMENT
[FreeTextEntry1] : BPH s/p TURP x3. recent bother after vazquez for hospitalization during AAA repair. Emptying well. Reassurance provided\par --Bowel regimen\par --Encourage fluid intake\par \par Gross Hematuria after difficult vazquez. Negative eval

## 2022-11-15 PROBLEM — N39.0 ACUTE UTI: Status: RESOLVED | Noted: 2022-01-01 | Resolved: 2022-01-01

## 2022-12-02 NOTE — ED PROVIDER NOTE - CLINICAL SUMMARY MEDICAL DECISION MAKING FREE TEXT BOX
87-year-old male PMH chronic anemia, hypertension, hyperlipidemia, A. fib on Eliquis, CHF on torsemide and spironolactone, presenting from clinic with low hemoglobin (7.0), associated with dyspnea on exertion, no chest pain/lightheadedness. Given hx and physical, ddx includes but is not limited to  anemia, CHF exacerbation, electrolyte abnormalities.  Low concern for GI bleed given no hematochezia or melena, but will obtain SERGO and fecal occult.  plan for labs EKG chest x-ray reassess. Patient likely TBA for blood transfusion.

## 2022-12-02 NOTE — ED PROVIDER NOTE - OBJECTIVE STATEMENT
87-year-old male PMH chronic anemia, hypertension, hyperlipidemia, A. fib on Eliquis, CHF on torsemide and spironolactone, presenting from clinic with low hemoglobin (7.0), associated with dyspnea on exertion, no chest pain/lightheadedness. *** pending completion 87-year-old male PMH chronic anemia, hypertension, hyperlipidemia, A. fib on Eliquis, CHF on torsemide and spironolactone, presenting from clinic with low hemoglobin (7.0), associated with dyspnea on exertion, no chest pain/lightheadedness. Patient has history of chronic anemia,  was supposed to receive a bone marrow biopsy however was found to have low hemoglobin and was then sent to the emergency department.  Patient denies hematuria, hematochezia, hematemesis, melena, abdominal pain, chest pain, nausea, vomiting, diarrhea.

## 2022-12-02 NOTE — ED ADULT NURSE REASSESSMENT NOTE - CONDITION
Handoff rpt received from THAIS Salazar. Pt + COvid sent by PMD for blood transfusion HGB 7
Tolerating 1st unit PBC, vss no s&s reaction. call bell in reach.

## 2022-12-02 NOTE — ED PROVIDER NOTE - PHYSICAL EXAMINATION
GENERAL: Awake. Alert. NAD. Well nourished.  HEENT: NC/AT, Conjunctival pallor, no scleral icterus. Airway patent. Moist mucous membranes. Mucosal pallor.  LUNGS: CTAB. No wheezes or rales noted.  CARDIAC: Chest non-tender to palpation. RRR.  ABDOMEN: No masses noted. Soft, NT, ND, no rebound, no guarding.  EXT: No edema, no calf tenderness, distal pulses 2+ bilaterally  NEURO: A&Ox3. Moving all extremities. Sensation and strength intact throughout.   SKIN: Warm and dry.  PSYCH: Normal affect.

## 2022-12-02 NOTE — ED ADULT TRIAGE NOTE - CHIEF COMPLAINT QUOTE
Patient sent to ER by MD for blood transfusion with hgb of 7.0. Pt had been at [facility] for bone marrow test originally.

## 2022-12-02 NOTE — ED PROVIDER NOTE - PROGRESS NOTE DETAILS
Toshia Clemente DO (PGY2):  patient hemoglobin 7.2 in emergency department today.  Given history of heart failure, will transfuse for hemoglobin goal greater than 8. Will give Lasix 20 IV with pRBC. Oh: spoke with [facility] health will admit, prbc, lasix, tele monitoring.

## 2022-12-02 NOTE — ED ADULT NURSE REASSESSMENT NOTE - COMFORT CARE
pt using urinal,/assisted to bathroom/plan of care explained/repositioned
call bell in reach./assisted to bathroom/plan of care explained/repositioned

## 2022-12-02 NOTE — ED ADULT NURSE REASSESSMENT NOTE - GENERAL PATIENT STATE
Denies feeling lightheaded, dizzy, denies cp denies shortness of breath. vss./comfortable appearance

## 2022-12-02 NOTE — ED ADULT NURSE REASSESSMENT NOTE - SYMPTOMS
Pt on cm HX of arythmias tele tech reporting episode of v-tach see trend review tt Zeus provided to MD.. Pt denies cp palpitation, no shortness of breath denies dizziness or lightheadness. vss will cont to Monitor. Pt is on tele monitoring. +Pacemaker/defibrillator./none
none

## 2022-12-02 NOTE — ED PROVIDER NOTE - NSICDXPASTSURGICALHX_GEN_ALL_CORE_FT
PAST SURGICAL HISTORY:  AICD (automatic cardioverter/defibrillator) present placed 2006, replaced 2009    H/O angioplasty 1994    Hernia bilateral IHR 1991    S/P TURP (status post transurethral resection of prostate) x 2- Oct, Nov 2014

## 2022-12-02 NOTE — ED PROVIDER NOTE - NSICDXPASTMEDICALHX_GEN_ALL_CORE_FT
PAST MEDICAL HISTORY:  A-fib dx 1981    BPH (benign prostatic hyperplasia)     CAD (coronary artery disease) MI in 1994 balloon angioplasty    CHF (congestive heart failure)     GERD (gastroesophageal reflux disease)     HLD (hyperlipidemia)     HTN (hypertension)     IBS (irritable bowel syndrome) with constipation    PITO (obstructive sleep apnea) sleeps with O2 unable to tolerate cpap    Peripheral edema     SSS (sick sinus syndrome) AICD placed 11/06 generator change 10/09

## 2022-12-02 NOTE — ED PROVIDER NOTE - ATTENDING CONTRIBUTION TO CARE
I (Jose C) agree with above, I performed a history and physical. Counseled ines medical staff, physician assistant, and/or medical student on medical decision making as documented. Medical decisions and treatment interventions were made in real time during the patient encounter. Additionally and/or with the following exceptions: Patient is a 87-year-old male past medical history of COPD, BPH with TURP, hypertension, CHF, GERD, AAA with endovascular repair who is presenting to the emergency department with low hemoglobin.  Was at his hematologist office as an outpatient for a bone marrow biopsy, and was found to have hemoglobin of 7.  Has had transfusions before.  Has chronic shortness of breath on exertion, but no new worsening shortness of breath.  No episodes of chest pain.  Patient is elderly appearing but well-appearing.  Does have conjunctival pallor is neuro intact.  Will obtain EKG, screening labs, troponin, BNP, and chest x-ray , patient has no history of melena but will require SERGO with guaiac.  If patient's hemoglobin is less than 8, plan to transfuse.

## 2022-12-02 NOTE — ED ADULT NURSE REASSESSMENT NOTE - ANCILLARY STATUS
Pt placed on cm 1st unit of PBC intiated to run over 3 hours at 10pm . S&s of transfusion reaction discussed with pt, pt verbalizing understanding pt st" I had a transfusion before in April" ./EKG at bedside

## 2022-12-03 NOTE — PATIENT PROFILE ADULT - FALL HARM RISK - HARM RISK INTERVENTIONS
Assistance OOB with selected safe patient handling equipment/Communicate Risk of Fall with Harm to all staff/Reinforce activity limits and safety measures with patient and family/Tailored Fall Risk Interventions/Use of alarms - bed, chair and/or voice tab/Visual Cue: Yellow wristband and red socks/Bed in lowest position, wheels locked, appropriate side rails in place/Call bell, personal items and telephone in reach/Instruct patient to call for assistance before getting out of bed or chair/Non-slip footwear when patient is out of bed/[place] to call system/Physically safe environment - no spills, clutter or unnecessary equipment/Purposeful Proactive Rounding/Room/bathroom lighting operational, light cord in reach

## 2022-12-03 NOTE — H&P ADULT - PROBLEM SELECTOR PLAN 2
-recent EGD w/ Mendez's esophagus, no melena or black stool.  -no ulcers w/ bleeding  -not on PPI  -low concern for UGIB  -had c-scope that showed 2.5 cm transverse colonic polyp remaining, was to f/u w/ Dr. Brenner. Can email GI here to see if this is an inpatient eval or outpatient.

## 2022-12-03 NOTE — DISCHARGE NOTE PROVIDER - HOSPITAL COURSE
Patient is an 87 year M hx of chronic anemia, CAD, HTN, HLD, afib on eliquis, BPH, AAA (had saccular dilation w/ stent placement August 2022), CHF EF 35% on torsemide prn, colonic polyps w/ 2.5 cm remaining in transverse colon (to be evaluated w/ Dr. Brenner outpt), recent endoscopy w/ Mendez's esophagus, who presents w/ anemia.       Anemia.   Hg 7.2 received 1 unit pRBC  -had received 40 mg iv lasix prior  -had outpatient bone marrow biopsy, will f/u w/ his heme/onc on results in a week  -CTA abd/pelvis preliminary without any AAA leak.       Colonic polyp.   recent EGD w/ Mendez's esophagus, no melena or black stool.  no ulcers w/ bleeding  not on PPI  low concern for UGIB  had c-scope that showed 2.5 cm transverse colonic polyp remaining, was to f/u w/ Dr. Brenner. Can email GI here to see if this is an inpatient eval or outpatient.    2019 novel coronavirus disease (COVID-19).   currently on RA  remdesivir 12/4-12/8.      Chronic systolic congestive heart failure.   -currently euvolemic  -chest xray had mild pulm vasc congestion earlier  -fluid restrict 1.2 L per day  -On IV lasix.       COPD, mild.   symbicort BID  -proair prn.      S/P AAA (abdominal aortic aneurysm) repair.   had stent placed August 2022  -no abdominal pain, low concern for issue w/ AAA  -CT a/p w/ iv contrast preliminarily without any evidence for leak.      BPH (benign prostatic hyperplasia).   can give flomax 0.4 mg daily.      Nutrition, metabolism, and development symptoms.    F-1.2 L F restriction      Hospital course discussed with medical attending. Patient is medically stable for discharge home Patient is an 87 year M hx of chronic anemia, CAD, HTN, HLD, afib on eliquis, BPH, AAA (had saccular dilation w/ stent placement August 2022), CHF EF 35% on torsemide prn, colonic polyps w/ 2.5 cm remaining in transverse colon (to be evaluated w/ Dr. Brenner outpt), recent endoscopy w/ Mendez's esophagus, who presents w/ anemia.       Anemia.   Hg 7.2 received 1 unit pRBC  -had received 40 mg iv lasix prior  -had outpatient bone marrow biopsy, will f/u w/ his heme/onc on results in a week  -CTA abd/pelvis preliminary without any AAA leak.       Colonic polyp.   recent EGD w/ Mendez's esophagus, no melena or black stool.  no ulcers w/ bleeding  not on PPI  low concern for UGIB  had c-scope that showed 2.5 cm transverse colonic polyp remaining, was to f/u w/ Dr. Brenner. Can email GI here to see if this is an inpatient eval or outpatient.    2019 novel coronavirus disease (COVID-19).   currently on RA  remdesivir 12/4-12/8.  No oxygen requirements      Chronic systolic congestive heart failure.   -currently euvolemic  -chest xray had mild pulm vasc congestion earlier  -fluid restrict 1.2 L per day  Continue torsemide on discharge weekly       COPD, mild.   symbicort BID  -proair prn.      S/P AAA (abdominal aortic aneurysm) repair.   had stent placed August 2022  -no abdominal pain, low concern for issue w/ AAA  -CT a/p w/ iv contrast preliminarily without any evidence for leak.      BPH (benign prostatic hyperplasia).   can give flomax 0.4 mg daily.      Nutrition, metabolism, and development symptoms.    F-1.2 L F restriction      Hospital course discussed with medical attending. Patient is medically stable for discharge home. Patient plans to follow up outpatient with Dr. Wooten. Patient is an 87 year M hx of chronic anemia, CAD, HTN, HLD, afib on eliquis, BPH, AAA (had saccular dilation w/ stent placement August 2022), CHF EF 35% on torsemide prn, colonic polyps w/ 2.5 cm remaining in transverse colon (to be evaluated w/ Dr. Brenner outpt), recent endoscopy w/ Gonzales's esophagus, who presents w/ anemia.     ****INCOMPLETE****    Acute on chronic anemia  - Hgb 7.2 on admission s/p 1u PRBC  - H/H fluctuated while inpatient, now stable on day of discharge >8.0 (8.6)  -had outpatient bone marrow biopsy with hematology --> no abnormalities on prelim, final report pending   -CTA abd/pelvis preliminary without any AAA leak. Vascular surgery consulted -->drop in Hb not caused by Endoleak and no need for repeat imaging.   - GI consulted --> no urgent indication for endoscopy or colonoscopy  - Baseline HgB 7-8, recent EGD consistent with gonzales's esophagus  - Continue with PPI therapy, supplementing iron   - NM bleeding scan not tolerated for full length, no evidence of bleeding at 72 minute yuliet  - Blood in urine s/p vazquez catheter removal --> ______resolved?_____  - ____resume eliquis?____    Colonic polyp  - coloscopy august 2022 demonstrated  2.5 cm transverse colonic polyp  - recent EGD w/ Gonzales's esophagus, no melena or black stool.  - no ulcers w/ bleeding  - Outpatient follow up with Dr. Brenner    2019 novel coronavirus disease (COVID-19).   - Satting well on RA   - Remdesivir 12/4-12/8.    Chronic systolic congestive heart failure.   -currently euvolemic  -chest xray had mild pulm vasc congestion earlier  -fluid restrict 1.2 L per day  - Continue torsemide on discharge weekly     COPD, mild.   - symbicort BID  -proair prn.    S/P AAA (abdominal aortic aneurysm) repair.   -no abdominal pain, low concern for issue w/ AAA  -CT a/p w/ iv contrast preliminarily without any evidence for leak.  - Vascular surgery consulted and cleared patient     BPH (benign prostatic hyperplasia).   - can give flomax 0.4 mg daily.    Nutrition, metabolism, and development symptoms.    F-1.2 L F restriction    On 12/8/22, discussed with Dr. Thibodeaux, patient is medically cleared and optimized for discharge today. All medications were reviewed with attending, and sent to mutually agreed upon pharmacy.  Reviewed discharge medications with patient; All new medications requiring new prescription sent to pharmacy of patients choice. Reviewed need for prescription for previous home medications and new prescriptions sent if requested. Patient in agreement and understands. Patient plans to follow up outpatient with Dr. Wooten. Patient is an 87 year M hx of chronic anemia, CAD, HTN, HLD, afib on eliquis, BPH, AAA (had saccular dilation w/ stent placement August 2022), CHF EF 35% on torsemide prn, colonic polyps w/ 2.5 cm remaining in transverse colon (to be evaluated w/ Dr. Brenner outpt), recent endoscopy w/ Gonzales's esophagus, who presents w/ anemia.     Acute on chronic anemia  - Hgb 7.2 on admission s/p 1u PRBC , 4u total transfused   - H/H fluctuated while inpatient, now stable on day of discharge >8.0 (8.6)  - had outpatient bone marrow biopsy with hematology --> no abnormalities on prelim, final report pending   - CTA abd/pelvis preliminary without any AAA leak. Vascular surgery consulted -->drop in Hb not caused by Endoleak and no need for repeat imaging.   - GI consulted --> no urgent indication for endoscopy or colonoscopy  - Baseline HgB 7-8, recent EGD consistent with gonzales's esophagus  - Continue with PPI therapy, supplementing iron   - NM bleeding scan not tolerated for full length, no evidence of bleeding at 72 minute yuliet  - Blood in urine s/p vazquez catheter removal --> resolved  - Eliquis to be held 12/8, resume on 12/9     Colonic polyp  - coloscopy august 2022 demonstrated  2.5 cm transverse colonic polyp  - recent EGD w/ Gonzales's esophagus, no melena or black stool.  - no ulcers w/ bleeding  - Outpatient follow up with Dr. Brenner    2019 novel coronavirus disease (COVID-19).   - Satting well on RA   - Remdesivir 12/4-12/8.    Chronic systolic congestive heart failure.   -currently euvolemic  -chest xray had mild pulm vasc congestion earlier  -fluid restrict 1.2 L per day  - Continue torsemide on discharge weekly     COPD, mild.   - symbicort BID  -proair prn.    S/P AAA (abdominal aortic aneurysm) repair.   -no abdominal pain, low concern for issue w/ AAA  -CT a/p w/ iv contrast preliminarily without any evidence for leak.  - Vascular surgery consulted and cleared patient     BPH (benign prostatic hyperplasia).   - can give flomax 0.4 mg daily.    Nutrition, metabolism, and development symptoms.    F-1.2 L F restriction    On 12/8/22, discussed with Dr. Thibodeaux, patient is medically cleared and optimized for discharge today. All medications were reviewed with attending, and sent to mutually agreed upon pharmacy.  Reviewed discharge medications with patient; All new medications requiring new prescription sent to pharmacy of patients choice. Reviewed need for prescription for previous home medications and new prescriptions sent if requested. Patient in agreement and understands. Patient plans to follow up outpatient with Dr. Wooten. Patient is an 87 year M hx of chronic anemia, CAD, HTN, HLD, afib on eliquis, BPH, AAA (had saccular dilation w/ stent placement August 2022), CHF EF 35% on torsemide prn, colonic polyps w/ 2.5 cm remaining in transverse colon (to be evaluated w/ Dr. Brenner outpt), recent endoscopy w/ Gonzales's esophagus, who presents w/ anemia.     Acute on chronic anemia  - Hgb 7.2 on admission s/p 1u PRBC , 4u total transfused   - H/H fluctuated while inpatient, now stable on day of discharge >8.0 (8.6)  - had outpatient bone marrow biopsy with hematology --> no abnormalities on prelim, final report pending   - CTA abd/pelvis preliminary without any AAA leak. Vascular surgery consulted -->drop in Hb not caused by Endoleak and no need for repeat imaging.   - GI consulted --> no urgent indication for endoscopy or colonoscopy  - Baseline HgB 7-8, recent EGD consistent with gonzales's esophagus  - Continue with PPI therapy, supplementing iron   - NM bleeding scan not tolerated for full length, no evidence of bleeding at 72 minute yuliet  - Blood in urine s/p vazquez catheter removal --> resolved  - Eliquis to be held 12/8, resume on 12/9     Colonic polyp  - coloscopy august 2022 demonstrated  2.5 cm transverse colonic polyp  - recent EGD w/ Gonzales's esophagus, no melena or black stool.  - no ulcers w/ bleeding  - Outpatient follow up with Dr. Brenner    2019 novel coronavirus disease (COVID-19).   - Satting well on RA   - Remdesivir 12/4-12/8.    Chronic systolic congestive heart failure.   -currently euvolemic  -chest xray had mild pulm vasc congestion earlier  -fluid restrict 1.2 L per day  - Continue torsemide on discharge weekly     COPD, mild.   - symbicort BID  -proair prn.    S/P AAA (abdominal aortic aneurysm) repair.   -no abdominal pain, low concern for issue w/ AAA  -CT a/p w/ iv contrast preliminarily without any evidence for leak.  - Vascular surgery consulted and cleared patient     BPH (benign prostatic hyperplasia).   - can give flomax 0.4 mg daily.    Nutrition, metabolism, and development symptoms.    F-1.2 L F restriction    On 12/8/22, discussed with Dr. Thibodeaux, patient is medically cleared and optimized for discharge today. All medications were reviewed with attending, and sent to mutually agreed upon pharmacy.  Reviewed discharge medications with patient; All new medications requiring new prescription sent to pharmacy of patients choice. Reviewed need for prescription for previous home medications and new prescriptions sent if requested. Patient in agreement and understands. Patient plans to follow up outpatient with Dr. Wooten.      Attending Addendum:  Patient seen and examined by me on the discharge day. no cp, no sob, no n/v/d. no abdominal pain.  no headache, no dizziness. hgb stable. received 1 unit pRBC last night to keep above hgb 8 per GI.  stable on room air. nasal canula weaned off. s/p IV Lasix.  Switch back to home Torsemide. Eager to go home.   Medications reviewed. All questions answered in details. Follow up plan explained.  More than 30 mins were spent evaluating patient and coordinating care for discharge.  Discharge summary sent to pt's primary care physician at [facility].

## 2022-12-03 NOTE — DISCHARGE NOTE PROVIDER - CARE PROVIDER_API CALL
Gabe Brenner)  Gastroenterology; Internal Medicine  [address] [place] [id number]  Phone: [phone]  Fax: [phone]  Follow Up Time: 1 week   Gabe Brenner)  Gastroenterology; Internal Medicine  [address] [place] [address] [address]  [place] [address]  Phone: [phone]  Fax: [phone]  Follow Up Time: 1 week    Haylee Gamez)  Internal Medicine  [address] [place] [address]  Phone: [phone]  Fax: [phone]  Follow Up Time:

## 2022-12-03 NOTE — H&P ADULT - HISTORY OF PRESENT ILLNESS
Patient is an 87 year M hx of chronic anemia, CAD, HTN, HLD, afib on eliquis, BPH, AAA (had saccular dilation w/ stent placement August 2022), CHF EF 35% on torsemide prn, colonic polyps w/ 2.5 cm remaining in transverse colon (to be evaled w/ Dr. Brenner outpt), recent endoscopy w/ Mendez's esophagus, who presents w/ anemia. Per patient he has had chronic anemia most of his life, but hasn't needed too many blood transfusions (April 2022 he had one). He denies melena or hematochezia, and had a recent c-scope w/ 2 cm transverse polyp removed, had another 0.5 cm polyp removed, and has remaining 2.5 cm polyp. He also has diverticulosis. He had a bone marrow biopsy recently, and is awaiting results with his oncologist. Denies fevers, chills, nausea, vomiting, diarrhea, sob, headaches.     ED course: Chest xray with mild pulmonary vascular congestion, was given 40 mg iv lasix. Was given 1 unit pRBC denies sob.   Incidentally COVID-19 positive, but on RA.

## 2022-12-03 NOTE — DISCHARGE NOTE PROVIDER - NSDCFUADDAPPT_GEN_ALL_CORE_FT
Please follow up with your primary care provider in 1-2 weeks following discharge from the hospital for continued monitoring and management of your anemia.     Please follow up with your hematologist regarding the results of your bone marrow biopsy.     Please follow up with Dr. Brenner for further evaluation of colonic polyp and esophagitis.

## 2022-12-03 NOTE — DISCHARGE NOTE PROVIDER - PROVIDER TOKENS
PROVIDER:[TOKEN:[[id number]],FOLLOWUP:[1 week]] PROVIDER:[TOKEN:[[id number]],FOLLOWUP:[1 week]],PROVIDER:[TOKEN:[[id number]]]

## 2022-12-03 NOTE — ED ADULT NURSE REASSESSMENT NOTE - NS ED NURSE REASSESS COMMENT FT1
BREAK COVERAGE RN: Pt. a&ox4, respirations appear unlabored, VSS as noted. Denies chest pain, back pain, SOB, fevers, chills. comfort measures in place. Call bell within reach.

## 2022-12-03 NOTE — H&P ADULT - NSICDXPASTSURGICALHX_GEN_ALL_CORE_FT
PAST SURGICAL HISTORY:  AICD (automatic cardioverter/defibrillator) present placed 2006, replaced 2009    H/O angioplasty 1994    Hernia bilateral IHR 1991    S/P TURP (status post transurethral resection of prostate) x 2- Oct, Nov 2014     Rhomboid Transposition Flap Text: The defect edges were debeveled with a #15 scalpel blade.  Given the location of the defect and the proximity to free margins a rhomboid transposition flap was deemed most appropriate.  Using a sterile surgical marker, an appropriate rhomboid flap was drawn incorporating the defect.    The area thus outlined was incised deep to adipose tissue with a #15 scalpel blade.  The skin margins were undermined to an appropriate distance in all directions utilizing iris scissors.

## 2022-12-03 NOTE — DISCHARGE NOTE PROVIDER - CARE PROVIDERS DIRECT ADDRESSES
[email or web address]

## 2022-12-03 NOTE — H&P ADULT - PROBLEM SELECTOR PLAN 4
-currently euvolemic  -chest xray had mild pulm vasc congestion earlier  -fluid restrict 1.2 L per day  -strict ins and outs

## 2022-12-03 NOTE — DISCHARGE NOTE PROVIDER - NSDCCPCAREPLAN_GEN_ALL_CORE_FT
PRINCIPAL DISCHARGE DIAGNOSIS  Diagnosis: Anemia  Assessment and Plan of Treatment: you have received - 1u PRBC  evaluated by GI-avoid NSAIDs (Motrin, Advil or ALeve)   Sinnce Hg near baseline, no evidence of overt GI bleeding, and patient now COVID positive, no acute indication for urgent polyp removal; recommend outpatient follow up as scheduled with Dr. Buenrostro       PRINCIPAL DISCHARGE DIAGNOSIS  Diagnosis: Anemia  Assessment and Plan of Treatment: you have received - 1u PRBC  evaluated by GI-avoid NSAIDs (Motrin, Advil or ALeve)   Since Hg near baseline, no evidence of overt GI bleeding, and patient now COVID positive, no acute indication for urgent polyp removal; recommend outpatient follow up as scheduled with Dr. Buenrostro  had outpatient bone marrow biopsy. Follow up with heme/onc on results in a week      SECONDARY DISCHARGE DIAGNOSES  Diagnosis: Colonic polyp  Assessment and Plan of Treatment: recent EGD w/ Mendez's esophagus, no melena or black stool.  no ulcers w/ bleeding  You had a c-scope that showed 2.5 cm transverse colonic polyp remaining, Please follow up with  Dr. Brenner.    Diagnosis: 2019 novel coronavirus disease (COVID-19)  Assessment and Plan of Treatment: currently on room. Does not require oxygen  remdesivir 12/4-12/8.    Diagnosis: S/P AAA (abdominal aortic aneurysm) repair  Assessment and Plan of Treatment: Recent AAA repair.   history of  stent placement in August 2022  Low concern for  issue w/ AAA  A CT of the abdomen/pelvis  without any evidence for leak.      Diagnosis: Chronic systolic congestive heart failure  Assessment and Plan of Treatment: Does not appear to be fluid overload  chest xray with mild congestion   -fluid restrict 1.2 L per day  -On IV lasix. Transition to    Diagnosis: BPH (benign prostatic hyperplasia)  Assessment and Plan of Treatment: Continue flomax    Diagnosis: COPD, mild  Assessment and Plan of Treatment: Continue symbicort   -proair as needed     PRINCIPAL DISCHARGE DIAGNOSIS  Diagnosis: Anemia  Assessment and Plan of Treatment: - You came in with worsening anemia, you have received - 4 units of packed red blood cells   evaluated by GI-avoid NSAIDs (Motrin, Advil or Aleve)   - You blood count is now at baseline and there is no evidence of overt GI bleeding - so you can follow up as scheduled with Dr. Patton  -You had an outpatient bone marrow biopsy. Follow up with heme/onc on results in a week      SECONDARY DISCHARGE DIAGNOSES  Diagnosis: COPD, mild  Assessment and Plan of Treatment: Continue symbicort   -proair as needed    Diagnosis: Colonic polyp  Assessment and Plan of Treatment: recent EGD w/ Mendez's esophagus, no melena or black stool.  no ulcers w/ bleeding  You had a c-scope that showed 2.5 cm transverse colonic polyp remaining, Please follow up with  Dr. Brenner.    Diagnosis: 2019 novel coronavirus disease (COVID-19)  Assessment and Plan of Treatment: Currently on room. Does not require oxygen  remdesivir 12/4-12/8.    Diagnosis: Chronic systolic congestive heart failure  Assessment and Plan of Treatment: Does not appear to be fluid overload  chest xray with mild congestion   -fluid restrict 1.2 L per day  -On IV lasix. Transition to    Diagnosis: BPH (benign prostatic hyperplasia)  Assessment and Plan of Treatment: Please continue your current medication regimen and follow up with your primary care provider for continued monitoring and care.    Diagnosis: S/P AAA (abdominal aortic aneurysm) repair  Assessment and Plan of Treatment: You were evaluated by vascular surgery and found to have no bleeding from your AAA repair. Please follow up with your primary care provider in 1-2 weeks following discharge from the hospital for continued monitoring and management.

## 2022-12-03 NOTE — H&P ADULT - NSHPLABSRESULTS_GEN_ALL_CORE
LABS:                         7.2    4.07  )-----------( 181      ( 02 Dec 2022 16:00 )             25.5     12-02    137  |  103  |  28<H>  ----------------------------<  106<H>  4.5   |  27  |  0.96    Ca    10.7<H>      02 Dec 2022 16:00  Mg     1.90     12-02    TPro  6.7  /  Alb  4.0  /  TBili  0.9  /  DBili  x   /  AST  30  /  ALT  15  /  AlkPhos  181<H>  12-02    PT/INR - ( 02 Dec 2022 16:00 )   PT: 24.9 sec;   INR: 2.13 ratio         PTT - ( 02 Dec 2022 16:00 )  PTT:42.5 sec      RADIOLOGY, EKG & ADDITIONAL TESTS: Reviewed.

## 2022-12-03 NOTE — H&P ADULT - PROBLEM SELECTOR PLAN 8
F-1.2 L F restriction  E-replete prn  N-DASH/TLC 1.2 L fluid restriction  hold AC for now pending GI eval, if no role for intervention and low risk of continued bleeding can resume

## 2022-12-03 NOTE — H&P ADULT - PROBLEM SELECTOR PLAN 6
-had stent placed August 2022  -no abdominal pain, low concern for issue w/ AAA  -can obtain CT a/p w/ iv contrast

## 2022-12-03 NOTE — H&P ADULT - ASSESSMENT
Patient is an 87 year M hx of chronic anemia, CAD, HTN, HLD, afib on eliquis, BPH, AAA (had saccular dilation w/ stent placement August 2022), CHF EF 35% on torsemide prn, colonic polyps w/ 2.5 cm remaining in transverse colon (to be evaled w/ Dr. Brenner outpt), recent endoscopy w/ Mendez's esophagus, who presents w/ anemia.

## 2022-12-03 NOTE — DISCHARGE NOTE PROVIDER - NSDCMRMEDTOKEN_GEN_ALL_CORE_FT
alfuzosin 10 mg oral tablet, extended release: 1 tab(s) orally once a day  Cipro 250 mg oral tablet: 1 tab(s) orally every 12 hours  Coreg 3.125 mg oral tablet: 1 tab(s) orally 2 times a day  Coreg 3.125 mg oral tablet: 1 tab(s) orally 2 times a day  [facility] - oral capsule: 1 cap(s) orally once a day  Eliquis 5 mg oral tablet: 1 tab(s) orally 2 times a day  fluticasone nasal 27.5 mcg/inh nasal spray: 1 spray(s) nasal once a day, As Needed  fluticasone propionate: 50 micrograms as needed  glycopyrrolate 1 mg oral tablet: 2 tab(s) orally once a day (at bedtime)  glycopyrrolate 1 mg oral tablet: 1 tab(s) orally 2 times a day  loratadine 10 mg oral tablet: 1 tab(s) orally once a day  losartan 25 mg oral tablet: 1 tab(s) orally once a day  losartan 25 mg oral tablet: 1 tab(s) orally once a day (at bedtime)  multivitamin: daily  Percocet 5/325 oral tablet: 1 tab(s) orally every 4 hours, As Needed for pain  ranitidine 300 mg oral capsule: 1 cap(s) orally once a day (at bedtime)  Singulair 10 mg oral tablet: 1 tab(s) orally once a day  spironolactone: 12.5 milligram(s) orally once a day  spironolactone 25 mg oral tablet: 0.5 tab(s) orally every other day  super b complex 1 po daily:     torsemide 20 mg oral tablet: as needed for fluid overload  Trelegy Ellipta 200 mcg-62.5 mcg-25 mcg/inh inhalation powder: 1 puff(s) inhaled once a day  Vitamin C 500 mg oral capsule: 1 cap(s) orally once a day  vitamin c 500 mg po daily:     Vitamin D3 2000 intl units oral capsule: 1 cap(s) orally once a day   alfuzosin 10 mg oral tablet, extended release: 1 tab(s) orally once a day  Coreg 3.125 mg oral tablet: 1 tab(s) orally 2 times a day  [facility] - oral capsule: 1 cap(s) orally once a day  Eliquis 5 mg oral tablet: 1 tab(s) orally 2 times a day  fluticasone nasal 27.5 mcg/inh nasal spray: 1 spray(s) nasal once a day, As Needed  fluticasone propionate: 50 micrograms as needed  glycopyrrolate 1 mg oral tablet: 1 tab(s) orally 2 times a day  loratadine 10 mg oral tablet: 1 tab(s) orally once a day  losartan 25 mg oral tablet: 1 tab(s) orally once a day (at bedtime)  multivitamin: daily  Percocet 5/325 oral tablet: 1 tab(s) orally every 4 hours, As Needed for pain  ranitidine 300 mg oral capsule: 1 cap(s) orally once a day (at bedtime)  Singulair 10 mg oral tablet: 1 tab(s) orally once a day  spironolactone: 12.5 milligram(s) orally once a day  super b complex 1 po daily:     torsemide 20 mg oral tablet: orally once a week  Trelegy Ellipta 200 mcg-62.5 mcg-25 mcg/inh inhalation powder: 1 puff(s) inhaled once a day  Vitamin C 500 mg oral capsule: 1 cap(s) orally once a day  vitamin c 500 mg po daily:     Vitamin D3 2000 intl units oral capsule: 1 cap(s) orally once a day   alfuzosin 10 mg oral tablet, extended release: 1 tab(s) orally once a day  Coreg 3.125 mg oral tablet: 1 tab(s) orally 2 times a day  [facility] - oral capsule: 1 cap(s) orally once a day  fluticasone nasal 27.5 mcg/inh nasal spray: 1 spray(s) nasal once a day, As Needed  fluticasone propionate: 50 micrograms as needed  glycopyrrolate 1 mg oral tablet: 1 tab(s) orally 2 times a day  loratadine 10 mg oral tablet: 1 tab(s) orally once a day  losartan 25 mg oral tablet: 1 tab(s) orally once a day (at bedtime)  multivitamin: daily  Percocet 5/325 oral tablet: 1 tab(s) orally every 4 hours, As Needed for pain  ranitidine 300 mg oral capsule: 1 cap(s) orally once a day (at bedtime)  Singulair 10 mg oral tablet: 1 tab(s) orally once a day  spironolactone: 12.5 milligram(s) orally once a day  super b complex 1 po daily:     torsemide 20 mg oral tablet: orally once a week  Trelegy Ellipta 200 mcg-62.5 mcg-25 mcg/inh inhalation powder: 1 puff(s) inhaled once a day  Vitamin C 500 mg oral capsule: 1 cap(s) orally once a day  vitamin c 500 mg po daily:     Vitamin D3 2000 intl units oral capsule: 1 cap(s) orally once a day

## 2022-12-03 NOTE — H&P ADULT - PROBLEM SELECTOR PLAN 1
-Hg 7.2 given 1 unit pRBC  -euvolemic on exam after pRBC  -had received 40 mg iv lasix prior  -had outpatient bone marrow biopsy, will f/u w/ his heme/onc on results in a week  -email GI for colonic polyp below

## 2022-12-04 NOTE — DISCHARGE NOTE NURSING/CASE MANAGEMENT/SOCIAL WORK - PATIENT PORTAL LINK FT
You can access the FollowMyHealth Patient Portal offered by [facility] by registering at the following website: [email or web address]. By joining [facility]’s FollowMyHealth portal, you will also be able to view your health information using other applications (apps) compatible with our system.

## 2022-12-04 NOTE — CHART NOTE - NSCHARTNOTEFT_GEN_A_CORE
87M with PMhx of chronic anemia, CAD, HTN, HLD, afib on eliquis, BPH, AAA (had saccular dilation w/ stent placements August 2022 with Dr. Corado at [place]), CHF EF 35% on torsemide prn, colonic polyps w/ 2.5 cm remaining in transverse colon  recent endoscopy w/ Mendez's esophagus, who presents w/ anemia. Patient's discharge was rescinded today as final read of CT abdomen pelvis showed abdominal aortic endograft with 2 adjacent abdominal aortic aneurysms measuring 6.5 cm and 3.7 cm with an endoleak into the more inferior aneurysm; there appears to be a discontinuity in the graft at the level of the leak suspicious for a type III endoleak. Patient was evaluated by Vascular surgery, as per vascular no surgical intervention is required and to follow up with Dr. Corado at [place] this week ( CT CD is required prior to discharge ). Telemetry order discontinued as patient to patient to be discharged tomorrow.

## 2022-12-04 NOTE — DISCHARGE NOTE NURSING/CASE MANAGEMENT/SOCIAL WORK - NSDCPEPTCAREGIVEDUMATLIST _GEN_ALL_CORE
Heart Failure/Influenza Vaccination/Apixaban/Eliquis/Coronavirus/COVID19 Heart Failure/Influenza Vaccination/Coronavirus/COVID19

## 2022-12-04 NOTE — CHART NOTE - NSCHARTNOTEFT_GEN_A_CORE
Discharged rescinded at this time. Writer received called from Radiology regarding final read of CT abdomen. Imaging with evidence of endoleak representing a change from the preliminary   interpretation of no endoleak. Dr. Carolina made aware. Patient called and informed both patient and his spouse.

## 2022-12-04 NOTE — DISCHARGE NOTE NURSING/CASE MANAGEMENT/SOCIAL WORK - NSDCPEFALRISK_GEN_ALL_CORE
For information on Fall & Injury Prevention, visit: [email or web address] OR  [email or web address] OR  https://www.cdc.gov/steadi/patient.html

## 2022-12-05 NOTE — CHART NOTE - NSCHARTNOTEFT_GEN_A_CORE
Patient seen and examined for acute drop in Hb. On physical exam, abdomen soft, non-distended, non-tender to palpation, no rebound or guarding. VSS. From vascular standpoint, drop in Hb not caused by Endoleak. Patient is asymptomatic. Recommend GI consult to identify bleeding source. Communicated recommendations/findings with primary team.    Vascular Surgery  [id number] Patient seen and examined for acute drop in Hb. On physical exam, abdomen soft, non-distended, non-tender to palpation, no rebound or guarding. VSS. From vascular standpoint, drop in Hb not caused by Endoleak and no need for repeat imaging. Patient is asymptomatic. Recommend GI consult to identify bleeding source. Communicated recommendations/findings with primary team.    Vascular Surgery  [id number]

## 2022-12-05 NOTE — PROVIDER CONTACT NOTE (CRITICAL VALUE NOTIFICATION) - ASSESSMENT
Pt resting comfortably with no signs or symptoms of distress. No c/o of lightheadedness or dizziness.

## 2022-12-05 NOTE — CHART NOTE - NSCHARTNOTEFT_GEN_A_CORE
Contacted by RN ~7 AM of critical lab value of Hgb: 6.5. Hemoglobin previously 8 on 12/4. Medical history significant for  AAA repair. Presented to the hospital secondary to anemia. Provider assessed patient at bedside. Appeared in no acute distress. Denies dizziness, headache, SOB, palpitations, rectal bleeding.   - CT A A/P prelim -Infrarenal abdominal aortic aneurysm post stent graft treatment. No evidence of endoleak. 2. Massive cardiomegaly. 3. Layering bilateral pleural effusions. 4. Likely mild pulmonary edema.    Plan:   -Dropping Hgb concerning d/t medical history  -VSS  -Advised RN to draw STAT repeat Hgb   -Type and screen active  -If repeat persistently low will need a transfusion  -Vascular contacted awaiting response with further instructions  -Day team made aware of critical and will follow up on repeat and Vascular recommendations Contacted by RN ~7 AM of critical lab value of Hgb: 6.5. Hemoglobin previously 8 on 12/4. Medical history significant for  AAA repair. Presented to the hospital secondary to anemia. Provider assessed patient at bedside. Appeared in no acute distress. Denies dizziness, headache, SOB, palpitations, rectal bleeding.   - CT A A/P prelim -Infrarenal abdominal aortic aneurysm post stent graft treatment. No evidence of endoleak. 2. Massive cardiomegaly. 3. Layering bilateral pleural effusions. 4. Likely mild pulmonary edema.    Plan:   -Dropping Hgb concerning d/t medical history  -VSS  -Advised RN to draw STAT repeat Hgb   -Type and screen active  -If repeat persistently low will need a transfusion  -Vascular contacted; spoke with Andrade Ross who stated he would follow up on repeat Hgb and contact team if any repeat imaging suggested by team   -Day team made aware of critical and will follow up on repeat and Vascular recommendations Contacted by RN ~7 AM of critical lab value of Hgb: 6.5. Hemoglobin previously 8 on 12/4. Medical history significant for  AAA repair. Presented to the hospital secondary to anemia. Provider assessed patient at bedside. Appeared in no acute distress. Denies dizziness, headache, SOB, palpitations, rectal bleeding.   - CT A A/P prelim -Infrarenal abdominal aortic aneurysm post stent graft treatment. No evidence of endoleak. 2. Massive cardiomegaly. 3. Layering bilateral pleural effusions. 4. Likely mild pulmonary edema.    PHYSICAL EXAM:  GENERAL: NAD, well-developed  HEAD:  Atraumatic, Normocephalic  EYES: EOMI, PERRLA, conjunctiva and sclera clear  NECK: Supple, No JVD  CHEST/LUNG: Clear to auscultation bilaterally; No wheeze/rhonchi/rale  HEART: Regular rate and rhythm; No murmurs, rubs, or gallops  ABDOMEN: Soft, Nontender, Nondistended; Bowel sounds present  EXTREMITIES:  2+ Peripheral Pulses, No clubbing, cyanosis, or edema  PSYCH: AAOx3  NEUROLOGY: non-focal  SKIN: No rashes or lesions      Plan:   -Dropping Hgb concerning d/t medical history  -VSS  -Advised RN to draw STAT repeat Hgb   -Type and screen active  -If repeat persistently low will need a transfusion  -Vascular contacted; spoke with Andrade Ross who stated he would follow up on repeat Hgb and contact team if any repeat imaging suggested by team   -Day team made aware of critical and will follow up on repeat and Vascular recommendations

## 2022-12-05 NOTE — PROVIDER CONTACT NOTE (OTHER) - ACTION/TREATMENT ORDERED:
ACP notified, medications rescheduled to 8am, will notify AM RN to recheck BP, if BP still to low, AM RN to notify ACP.

## 2022-12-05 NOTE — CONSULT NOTE ADULT - SUBJECTIVE AND OBJECTIVE BOX
[facility], Division of Infectious Diseases  JH Crockett S. Shah, Y. Patel, G. Yunior   [phone]  after hours and weekends [phone]    LAUREL HAM  87y, Male  [id number]    HPI--  HPI:  Patient is an 87 year M hx of chronic anemia, CAD, HTN, HLD, afib on eliquis, BPH, AAA (had saccular dilation w/ stent placement August 2022), CHF EF 35% on torsemide prn, colonic polyps w/ 2.5 cm remaining in transverse colon (to be evaled w/ Dr. Brenner outpt), recent endoscopy w/ Mendez's esophagus, who presents w/ anemia.  for blood transfusion  found to be covid 19 positive   no cough, no diarrhea, no sob, no fever       PMH/PSH--  SSS (sick sinus syndrome)  BPH (benign prostatic hyperplasia)  HTN (hypertension)  CHF (congestive heart failure)  GERD (gastroesophageal reflux disease)  Peripheral edema  CAD (coronary artery disease)  HLD (hyperlipidemia)  A-fib  PITO (obstructive sleep apnea)  IBS (irritable bowel syndrome)  Hernia  AICD (automatic cardioverter/defibrillator) present  H/O angioplasty  S/P TURP (status post transurethral resection of prostate)        Allergies--codeine      Medications--  Antibiotics: remdesivir  IVPB 100 milliGRAM(s) IV Intermittent every 24 hours  remdesivir  IVPB   IV Intermittent     Immunologic:   Other: acetaminophen     Tablet .. PRN  albuterol    90 MICROgram(s) HFA Inhaler PRN  ascorbic acid  budesonide 160 MICROgram(s)/formoterol 4.5 MICROgram(s) Inhaler  carvedilol  cholecalciferol  furosemide   Injectable  loratadine  losartan  montelukast  spironolactone  tamsulosin      Social History--  EtOH: denies ***  Tobacco: former   Drug Use: denies ***    Family/Marital History--  No pertinent family history in first degree relatives          Travel/Environmental/Occupational History:  retired banker     Review of Systems:  REVIEW OF SYSTEMS  General: no fever, no chills, no wt loss	  Ophthalmologic: no blurry vision  Respiratory and Thorax: no cough, no dyspnea  Cardiovascular: no chest pain, no palpitations  Gastrointestinal:  no nausea, no vomiting, diarrhea  Genitourinary: no dysuria, no urgency, no frequency	  Musculoskeletal: no myalgias	  Neurological:  no headache	    Physical Exam--  Vital Signs: T(F): 97.4 (12-05-22 @ 14:20), Max: 98.4 (12-04-22 @ 22:29)  HR: 78 (12-05-22 @ 14:20)  BP: 105/68 (12-05-22 @ 14:20)  RR: 18 (12-05-22 @ 14:20)  SpO2: 95% (12-05-22 @ 14:20)  Wt(kg): --  General: Nontoxic-appearing Male in no acute distress.  HEENT: AT/NC  Neck: Not rigid. No sense of mass.  Nodes: None palpable.  Lungs: Clear bilaterally without rales, wheezing or rhonchi  Heart: Regular rate and rhythm.  Abdomen: Bowel sounds present and normoactive. Soft. Nondistended. Nontender.  Extremities: No cyanosis or clubbing. No edema.   Skin: Warm. Dry. Good turgor. No rash. No vasculitic stigmata.  Psychiatric: Appropriate affect and mood for situation.         Laboratory & Imaging Data--  CBC                        6.9    3.98  )-----------( 146      ( 05 Dec 2022 08:40 )             24.1       Chemistries  12-05    137  |  102  |  30<H>  ----------------------------<  105<H>  3.8   |  28  |  0.91    Ca    10.2      05 Dec 2022 05:14  Phos  3.2     12-05  Mg     1.80     12-05    TPro  5.7<L>  /  Alb  3.3  /  TBili  0.6  /  DBili  0.3  /  AST  20  /  ALT  13  /  AlkPhos  148<H>  12-05      Culture Data          
HPI:  LAUREL HAM is a 87 year old male with history of HTN, HLD, CAD, AF on Eliquis, AAA s/p stent placement August 2022, CHF EF 35% s/p AICD, colon polyps, and chronic anemia who presents with chronic anemia.    Patient underwent EGD and colonoscopy at [place] with Dr. 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 Brenner for EMR.  Patient states he followed up with Heme/Onc for his chronic anemia who performed a bone marrow biopsy yesterday.  However, on labs that they ivan [he does not remember Hg], he was told his blood counts were low and sent to [facility] ED.  He denies overt GI bleeding.  Found to be COVID positive.  Otherwise, patient denies fevers, chills, dysphagia, odynophagia, early satiety, poor oral intake, abdominal pain, nausea, vomiting, diarrhea, melena, hematemesis, hematochezia.    ROS:   General:  No fevers, chills, night sweats, fatigue  Eyes:  Good vision, no reported pain  ENT:  No sore throat, pain, runny nose  CV:  No pain, palpitations  Pulm:  No dyspnea, cough  GI:  See HPI, otherwise negative  :  No  incontinence, nocturia  Muscle:  No pain, weakness  Neuro:  No memory problems  Psych:  No insomnia, mood problems, depression  Endocrine:  No polyuria, polydipsia, cold/heat intolerance  Heme:  No petechiae, ecchymosis, easy bruisability  Skin:  No rash    PMHX/PSHX:    SSS (sick sinus syndrome)    BPH (benign prostatic hyperplasia)    HTN (hypertension)    CHF (congestive heart failure)    GERD (gastroesophageal reflux disease)    Peripheral edema    CAD (coronary artery disease)    HLD (hyperlipidemia)    A-fib    PITO (obstructive sleep apnea)    IBS (irritable bowel syndrome)    Hernia    AICD (automatic cardioverter/defibrillator) present    H/O angioplasty    S/P TURP (status post transurethral resection of prostate)      Allergies:  codeine (Nausea)  hazelnuts, facial swelling (Other)      Home Medications: reviewed  Hospital Medications:  acetaminophen     Tablet .. 650 milliGRAM(s) Oral every 6 hours PRN  albuterol    90 MICROgram(s) HFA Inhaler 2 Puff(s) Inhalation every 6 hours PRN  ascorbic acid 500 milliGRAM(s) Oral daily  budesonide 160 MICROgram(s)/formoterol 4.5 MICROgram(s) Inhaler 2 Puff(s) Inhalation two times a day  carvedilol 3.125 milliGRAM(s) Oral every 12 hours  cholecalciferol 2000 Unit(s) Oral daily  furosemide   Injectable 20 milliGRAM(s) IV Push daily  loratadine 10 milliGRAM(s) Oral daily  losartan 25 milliGRAM(s) Oral every 24 hours  montelukast 10 milliGRAM(s) Oral at bedtime  spironolactone 12.5 milliGRAM(s) Oral daily  tamsulosin 0.4 milliGRAM(s) Oral at bedtime      Social History:   Tobacco: denies  Alcohol: denies  Recreational drugs: denies    Family history:    No pertinent family history in first degree relatives    No significant family history (Father)      Denies family history of colon cancer/polyps, stomach cancer/polyps, pancreatic cancer/masses, liver cancer/disease, ovarian cancer and endometrial cancer.    PHYSICAL EXAM:   Vital Signs:  Vital Signs Last 24 Hrs  T(C): 36.5 (03 Dec 2022 05:42), Max: 37 (02 Dec 2022 20:20)  T(F): 97.7 (03 Dec 2022 05:42), Max: 98.6 (02 Dec 2022 20:20)  HR: 81 (03 Dec 2022 05:42) (78 - 88)  BP: 121/79 (03 Dec 2022 05:42) (110/58 - 133/82)  BP(mean): --  RR: 20 (03 Dec 2022 05:42) (18 - 20)  SpO2: 98% (03 Dec 2022 05:42) (95% - 100%)    Parameters below as of 03 Dec 2022 05:42  Patient On (Oxygen Delivery Method): room air      Daily     Daily     GENERAL: no acute distress  NEURO: alert  HEENT: NCAT, no conjunctival pallor appreciated  CHEST: no respiratory distress, no accessory muscle use  CARDIAC: regular rate, +S1/S2  ABDOMEN: soft, nontender, no rebound or guarding  EXTREMITIES: warm, well perfused  SKIN: no lesions noted    LABS: reviewed                        7.8    4.33  )-----------( 172      ( 03 Dec 2022 07:06 )             26.8     12-03    138  |  102  |  29<H>  ----------------------------<  114<H>  4.6   |  26  |  1.04    Ca    10.8<H>      03 Dec 2022 07:06  Phos  3.0     12-03  Mg     2.30     12-03    TPro  6.9  /  Alb  4.2  /  TBili  2.3<H>  /  DBili  x   /  AST  29  /  ALT  17  /  AlkPhos  177<H>  12-03    LIVER FUNCTIONS - ( 03 Dec 2022 07:06 )  Alb: 4.2 g/dL / Pro: 6.9 g/dL / ALK PHOS: 177 U/L / ALT: 17 U/L / AST: 29 U/L / GGT: x               Diagnostic Studies: see sunrise for full report        
VASCULAR SURGERY CONSULT  87M with PMhx of chronic anemia, CAD, HTN, HLD, afib on eliquis, BPH, AAA (had saccular dilation w/ stent placements August 2022 with Dr. Corado at [place]), CHF EF 35% on torsemide prn, colonic polyps w/ 2.5 cm remaining in transverse colon (to be evaled w/ Dr. Brenner outpt), recent endoscopy w/ Mendez's esophagus, who presents w/ anemia. Received 1u PRBC. Found to have endoleak on CT.       PAST MEDICAL & SURGICAL HISTORY:  SSS (sick sinus syndrome)  AICD placed 11/06 generator change 10/09  BPH (benign prostatic hyperplasia)  HTN (hypertension)  CHF (congestive heart failure)  GERD (gastroesophageal reflux disease)  Peripheral edema  CAD (coronary artery disease)  MI in 1994 balloon angioplasty  HLD (hyperlipidemia)  A-fib  dx 1981  PITO (obstructive sleep apnea)  sleeps with O2 unable to tolerate cpap  IBS (irritable bowel syndrome)  with constipation  Hernia  bilateral IHR 1991  AICD (automatic cardioverter/defibrillator) present  placed 2006, replaced 2009  H/O angioplasty  1994  S/P TURP (status post transurethral resection of prostate)  x 2- Oct, Nov 2014      MEDICATIONS  (STANDING):  ascorbic acid 500 milliGRAM(s) Oral daily  budesonide 160 MICROgram(s)/formoterol 4.5 MICROgram(s) Inhaler 2 Puff(s) Inhalation two times a day  carvedilol 3.125 milliGRAM(s) Oral every 12 hours  cholecalciferol 2000 Unit(s) Oral daily  furosemide   Injectable 20 milliGRAM(s) IV Push daily  loratadine 10 milliGRAM(s) Oral daily  losartan 25 milliGRAM(s) Oral every 24 hours  montelukast 10 milliGRAM(s) Oral at bedtime  remdesivir  IVPB   IV Intermittent   spironolactone 12.5 milliGRAM(s) Oral daily  tamsulosin 0.4 milliGRAM(s) Oral at bedtime    MEDICATIONS  (PRN):  acetaminophen     Tablet .. 650 milliGRAM(s) Oral every 6 hours PRN Temp greater or equal to 38C (100.4F), Mild Pain (1 - 3)  albuterol    90 MICROgram(s) HFA Inhaler 2 Puff(s) Inhalation every 6 hours PRN Shortness of Breath and/or Wheezing      Allergies    codeine (Nausea)  hazelnuts, facial swelling (Other)      Physical Exam:  General: NAD, resting comfortably  HEENT: NC/AT, EOMI, normal hearing, no oral lesions, no LAD, neck supple  Pulmonary: normal resp effort, patent  Abdominal: soft, ND/NT, no organomegaly  Extremities: WWP, normal strength  Pulses: b/l palpable distal pulses fem, pop, DP - PT signal   Neuro: A/O x 3, CNs II-XII grossly intact, normal sensation, no focal deficits      Vital Signs Last 24 Hrs  T(C): 36.4 (04 Dec 2022 17:42), Max: 36.4 (04 Dec 2022 06:57)  T(F): 97.6 (04 Dec 2022 17:42), Max: 97.6 (04 Dec 2022 17:42)  HR: 85 (04 Dec 2022 17:42) (80 - 85)  BP: 102/58 (04 Dec 2022 17:42) (102/58 - 112/65)  BP(mean): --  RR: 19 (04 Dec 2022 17:42) (19 - 22)  SpO2: 98% (04 Dec 2022 17:42) (98% - 100%)    Parameters below as of 04 Dec 2022 17:42  Patient On (Oxygen Delivery Method): room air        I&O's Summary    03 Dec 2022 07:01  -  04 Dec 2022 07:00  --------------------------------------------------------  IN: 0 mL / OUT: 1075 mL / NET: -1075 mL            LABS:                        8.0    5.07  )-----------( 196      ( 04 Dec 2022 17:00 )             27.7     12-04    139  |  100  |  29<H>  ----------------------------<  124<H>  4.8   |  27  |  0.99    Ca    10.9<H>      04 Dec 2022 17:00  Phos  3.4     12-04  Mg     2.00     12-04    TPro  6.9  /  Alb  4.2  /  TBili  2.3<H>  /  DBili  x   /  AST  29  /  ALT  17  /  AlkPhos  177<H>  12-03        CAPILLARY BLOOD GLUCOSE        LIVER FUNCTIONS - ( 03 Dec 2022 07:06 )  Alb: 4.2 g/dL / Pro: 6.9 g/dL / ALK PHOS: 177 U/L / ALT: 17 U/L / AST: 29 U/L / GGT: x             Cultures:      RADIOLOGY & ADDITIONAL STUDIES:  < from: CT Angio Abdomen and Pelvis w/ IV Cont (12.03.22 @ 15:49) >  ACC: [id number] EXAM:  CT ANGIO ABD PELV (W)AW IC                          *** ADDENDUM***    The more inferior aneurysm is the 3.7 cm aneurysm. The 6.5 cm aneurysm   extends for a length of 5.9 cm craniocaudad and the more inferior   aneurysm extends for a length of 2.5 cm craniocaudad. There is an   approximately 0.6 cm distance between the 2 aneurysms.    --- End of Report ---    *** END OF ADDENDUM***      PROCEDURE DATE:  12/03/2022          INTERPRETATION:  FINAL REPORT:    CLINICAL INFORMATION: History of abdominal aortic aneurysm repair.   Evaluate for endoleak.    COMPARISON: None.    CONTRAST/COMPLICATIONS:  IV Contrast: Omnipaque 350  95 cc administered   5 cc discarded  Oral Contrast: NONE  Complications: None reported at time of study completion    PROCEDURE:  CT Angiography of the Abdomen and Pelvis.  Precontrast, Arterial and Delayed phases were acquired.  Sagittal and coronal reformats were performed as well as 3D (MIP)   reconstructions.    FINDINGS:  LOWER CHEST: Small loculated right pleural effusion. Marked cardiomegaly.   Partially imaged cardiac device wires.    LIVER: 1 cm low-attenuation lesion in the posterior segment of the right   hepatic lobe, possibly a cyst.  BILE DUCTS: Normal caliber.  GALLBLADDER: Cholelithiasis.  SPLEEN: Within normal limits.  PANCREAS: Within normal limits.  ADRENALS: Within normal limits.  KIDNEYS/URETERS: No hydronephrosis. Left renal cyst.    BLADDER: Within normal limits.  REPRODUCTIVE ORGANS: Enlarged prostate.    BOWEL: No bowel obstruction. Moderate amount retained fecal material in   the colon, greatest in the right hemicolon. No evidence of appendicitis.  PERITONEUM: No ascites.  VESSELS: Abdominal aortic endograft abdominal aortic aneurysm measuring   6.5 cm transverse with a second aneurysm more inferiorly measuring 3.7 cm   transverse. Endoleak into the more inferior aneurysm; there appears to be   a discontinuity in the graft at the level of the leak (series 605 image   39) suspicious for a type III endoleak.Iliac and superior mesenteric   arteries are patent. Renal arteries are patent.  RETROPERITONEUM/LYMPH NODES: No lymphadenopathy.  ABDOMINAL WALL: Small amount of fluid in the left inguinal canal.  BONES: No aggressive osseous lesion. Degenerative changes in the spine.    IMPRESSION:  Abdominal aortic endograft with 2 adjacent abdominal aortic aneurysms   measuring 6.5 cm and 3.7 cm with an endoleak into the more inferior   aneurysm; there appears to be a discontinuity in the graft at the level   of the leak suspicious for a type III endoleak.    Moderate cardiomegaly.    Small partially loculated right pleural effusion.    The above findings of endoleak represent a change from the preliminary   interpretation of no endoleak and were discussed with Dr. FELIPE Fontanez by   Dr. Molina with read back confirmation at 4:15 PM on 12/4/2022.    --- End of Report ---    < end of copied text >

## 2022-12-05 NOTE — CONSULT NOTE ADULT - ASSESSMENT
87 year old male with history of HTN, HLD, CAD, AF on Eliquis, AAA s/p stent placement August 2022, CHF EF 35% s/p AICD, colon polyps, and chronic anemia who presents with chronic anemia.  Patient underwent EGD and colonoscopy at [place] with Dr. 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 Brenner for EMR.  Patient states he followed up with Heme/Onc for his chronic anemia who performed a bone marrow biopsy yesterday.  However, on labs that they ivan [he does not remember Hg], he was told his blood counts were low and sent to [facility] ED.  He denies overt GI bleeding.  Found to be COVID positive.      # 25mm laterally spreading transverse colon polyp "possibly connected to additional 15mm sessile polyp" on colonoscopy 8/22/2022  # Chronic anemia [last known baseline Hg 7.8 in March 2022 per [employer]]: patient near baseline value with Hg 7.2 upon admission  # Endoscopic findings c/f Mendez's esophagus  # COVID positive    Recommendations:  -trend vitals, CBC, and monitor for clinical signs of bleeding  -maintain active type and screen  -transfusion goal to maintain hemoglobin >/= 8.0 and platelets >/= 50  -avoid NSAIDs  -given that patient is hemodynamically stable, Hg near baseline, no evidence of overt GI bleeding, and patient now COVID positive, no acute indication for urgent polyp removal; recommend outpatient follow up as scheduled with Dr. Brenner    Note incomplete until finalized by attending signature/attestation.    Christopher Beatty  GI/Hepatology Fellow    MONDAY-FRIDAY 8AM-5PM:  Pager# [phone] ([facility]) or [phone] ([facility])    NON-URGENT CONSULTS:  Please email [email or web address] [email or web address] OR [email or web address]  AT NIGHT AND ON WEEKENDS:  Contact on-call GI fellow via answering service ([phone]) from 5pm-8am and on weekends/holidays  
87M with PMhx of chronic anemia, CAD, HTN, HLD, afib on eliquis, BPH, AAA (had saccular dilation w/ stent placements August 2022 with Dr. Corado at [place]), CHF EF 35% on torsemide prn, colonic polyps w/ 2.5 cm remaining in transverse colon (to be evaled w/ Dr. Brenner outpt), recent endoscopy w/ Mendez's esophagus, who presents w/ anemia. Received 1u PRBC. Found to have endoleak on CT.     PLAN  - Pt to follow up with Dr. Corado at [place] this week  - Please burn a CD of the CT to bring to Dr. Corado  - No acute vascular surgery intervention    Discussed with Dr. Ellis on behalf of Dr. Fuchs    Vascular Surgery  [id number]    
Patient is an 87 year M hx of chronic anemia, CAD, HTN, HLD, afib on eliquis, BPH, AAA (had saccular dilation w/ stent placement August 2022), CHF EF 35% on torsemide prn, colonic polyps w/ 2.5 cm remaining in transverse colon (to be evaled w/ Dr. Brenner outpt), recent endoscopy w/ Mendez's esophagus, who presents w/ anemia.  for blood transfusion  found to be covid 19 positive     plan  remdesivir day 2 of 3 trend lft   steroids not indicated  ac-- pt with anemia  antibx not indicated  supportive care    trend biomarkers  and transfuse as needed

## 2022-12-06 NOTE — PROGRESS NOTE ADULT - ASSESSMENT
Patient is an 87 year M hx of chronic anemia, CAD, HTN, HLD, afib on eliquis, BPH, AAA (had saccular dilation w/ stent placement August 2022), CHF EF 35% on torsemide prn, colonic polyps w/ 2.5 cm remaining in transverse colon (to be evaled w/ Dr. Brenner outpt), recent endoscopy w/ Mendez's esophagus, who presents w/ anemia.  for blood transfusion  found to be covid 19 positive     plan  remdesivir -- complete 3d course today  steroids not indicated  ac-- pt with anemia  antibx not indicated  supportive care  trend biomarkers  and transfuse as needed

## 2022-12-06 NOTE — PROGRESS NOTE ADULT - ASSESSMENT
87 year old male with history of HTN, HLD, CAD, AF on Eliquis, AAA s/p stent placement August 2022, CHF EF 35% s/p AICD, colon polyps, and chronic anemia who presents with chronic anemia.  Patient underwent EGD and colonoscopy at [place] with Dr. 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 Brenner for EMR.  Patient states he followed up with Heme/Onc for his chronic anemia who performed a bone marrow biopsy yesterday.  However, on labs that they ivan [he does not remember Hg], he was told his blood counts were low and sent to [facility] ED.  He denies overt GI bleeding.  Found to be COVID positive.      # 25mm laterally spreading transverse colon polyp "possibly connected to additional 15mm sessile polyp" on colonoscopy 8/22/2022  # Acute on chronic anemia [last known baseline Hg 7.8 in March 2022 per [employer]]  # Iron deficiency  # Endoscopic findings c/f Mendez's esophagus  # Elevated BUN  # COVID positive    Recommendations:  -trend vitals, CBC, and monitor for clinical signs of bleeding  -maintain active type and screen  -transfusion goal to maintain hemoglobin >/= 8.0 and platelets >/= 50 [however patient's baseline Hg ~8]  -avoid NSAIDs  -please obtain results from recent bone marrow biopsy  -IV iron supplementation  -PPI IV BID while inpatient; needs to be on chronic PPI therapy indefinitely given underlying Mendez's  -once medically optimized and cleared by Cardiology given history, would consider endoscopy to rule out occult upper GI bleeding source  -no acute indication for urgent colon polyp removal given brown stools, recommend outpatient follow up as scheduled with Dr. Brenner    Note incomplete until finalized by attending signature/attestation.    Christopher Beatty  GI/Hepatology Fellow    MONDAY-FRIDAY 8AM-5PM:  Pager# [phone] ([facility]) or [phone] ([facility])    NON-URGENT CONSULTS:  Please email [email or web address]  AT NIGHT AND ON WEEKENDS:  Contact on-call GI fellow via answering service ([phone]) from 5pm-8am and on weekends/holidays 87 year old male with history of HTN, HLD, CAD, AF on Eliquis, AAA s/p stent placement August 2022, CHF EF 35% s/p AICD, colon polyps, and chronic anemia who presents with chronic anemia.  Patient underwent EGD and colonoscopy at [place] with Dr. 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 Brenner for EMR.  Patient states he followed up with Heme/Onc for his chronic anemia who performed a bone marrow biopsy yesterday.  However, on labs that they ivan [he does not remember Hg], he was told his blood counts were low and sent to [facility] ED.  He denies overt GI bleeding.  Found to be COVID positive.      # 25mm laterally spreading transverse colon polyp "possibly connected to additional 15mm sessile polyp" on colonoscopy 8/22/2022  # Acute on chronic anemia [last known baseline Hg 7.8 in March 2022 per [employer]]  # Iron deficiency  # Endoscopic findings c/f Mendez's esophagus  # Elevated BUN  # COVID positive    Recommendations:  -trend vitals, CBC, and monitor for clinical signs of bleeding  -maintain active type and screen  -transfusion goal to maintain hemoglobin >/= 8.0 and platelets >/= 50 [however patient's baseline Hg ~8]  -avoid NSAIDs  -please obtain results from recent bone marrow biopsy  -IV iron supplementation  -PPI IV BID while inpatient; if confirmed or suspected Mendez's, will need to be on chronic PPI therapy indefinitely  -once medically optimized and cleared by Cardiology given history, would consider endoscopy to rule out occult upper GI bleeding source  -no acute indication for urgent colon polyp removal given brown stools, recommend outpatient follow up as scheduled with Dr. Brenner    Note incomplete until finalized by attending signature/attestation.    Christopher Beatty  GI/Hepatology Fellow    MONDAY-FRIDAY 8AM-5PM:  Pager# [phone] ([facility]) or [phone] ([facility])    NON-URGENT CONSULTS:  Please email [email or web address]  AT NIGHT AND ON WEEKENDS:  Contact on-call GI fellow via answering service ([phone]) from 5pm-8am and on weekends/holidays

## 2022-12-06 NOTE — CHART NOTE - NSCHARTNOTEFT_GEN_A_CORE
Seen and assessed. Doing well. Had some abdominal cramping relieved by defecation. No abdominal pain, nausea , vomiting, or any issues. Getting transfusion right now. H/H this AM stable. Exam reveals normal VS. Abd soft, NTND. Labs as mentioned. No role for repeat imaging. Known endoleak would not cause anemia unless pt has ruptured which is not shown by imaging nor by clinical exam. Recommend GI workup given previous GIB. Recommend f/u with his vascular surgeon as outpatient.     Discussed with Dr. Baker

## 2022-12-07 NOTE — PROGRESS NOTE ADULT - ASSESSMENT
Patient is an 87 year M hx of chronic anemia, CAD, HTN, HLD, afib on eliquis, BPH, AAA (had saccular dilation w/ stent placement August 2022), CHF EF 35% on torsemide prn, colonic polyps w/ 2.5 cm remaining in transverse colon (to be evaled w/ Dr. Brenner outpt), recent endoscopy w/ Mendez's esophagus, who presents w/ anemia.  for blood transfusion  found to be covid 19 positive     plan  s/p remdesivir x 3d course completed 12/6  remains on RA, saturating well, steroids not indicated  ac-- pt with anemia  antibx not indicated  isolation per infection control protocol  continue supportive care  transfuse as needed   rest of management per primary team  stable from ID standpoint at this time    ID will sign off at this time but remains available for any further questions/concerns.    Arash Ellis M.D.  [facility], Division of Infectious Diseases  [phone]  After 5pm on weekdays and all day on weekends - please call [phone]

## 2022-12-07 NOTE — PROGRESS NOTE ADULT - NSPROGADDITIONALINFOA_GEN_ALL_CORE
- NM bleeding scan negative.     DC planning if hgb stable and if no further GI procedures.     - Dr. ASHLEY Britoet ([facility])  - [phone]

## 2022-12-07 NOTE — PROGRESS NOTE ADULT - PROBLEM SELECTOR PROBLEM 6
S/P AAA (abdominal aortic aneurysm) repair

## 2022-12-07 NOTE — PROGRESS NOTE ADULT - PROBLEM SELECTOR PLAN 1
-given 1 unit pRBC 12/2  -given 1 unit pRBC 12/5  -given 1 unit pRBC 12/6  -keep hgb above 7.0  -spoke with outside hematologist Dr. Nielson on 12/6; preliminarily BMBx without any acute findings, but await final report  -CTA abd/pelvis reviewed by vascular, no indication of active AAA leak  -doubt active GI bleed, but if he is scheduled for inpt endoscopy, would need cardiac clearance  - NM bleeding scan negative.
-Hg 7.2 given 1 unit pRBC  -euvolemic on exam after pRBC  -had received 40 mg iv lasix prior  -had outpatient bone marrow biopsy, will f/u w/ his heme/onc on results in a week  -CTA abd/pelvis preliminary without any AAA leak
-given 1 unit pRBC 12/2  -given 1 unit pRBC 12/5  -keep hgb above 7.0  -had outpatient bone marrow biopsy, will f/u w/ his heme/onc on results in a week  -CTA abd/pelvis reviewed by vascular, no indication of active AAA leak  -needs GI f/u visit given guaiac positivity for possible endoscopy as inpt
-given 1 unit pRBC 12/2  -given 1 unit pRBC 12/5  -given 1 unit pRBC 12/6  -keep hgb above 7.0  -spoke with outside hematologist Dr. Nielson on 12/6; preliminarily BMBx without any acute findings, but await final report  -CTA abd/pelvis reviewed by vascular, no indication of active AAA leak  -doubt active GI bleed, but if he is scheduled for inpt endoscopy, would need cardiac clearance

## 2022-12-07 NOTE — PROGRESS NOTE ADULT - ASSESSMENT
87 year old male with history of HTN, HLD, CAD, AF on Eliquis, AAA s/p stent placement August 2022, CHF EF 35% s/p AICD, colon polyps, and chronic anemia who presents with chronic anemia.  Patient underwent EGD and colonoscopy at [place] with Dr. 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 Brenner for EMR.  Patient states he followed up with Heme/Onc for his chronic anemia who performed a bone marrow biopsy yesterday.  However, on labs that they ivan [he does not remember Hg], he was told his blood counts were low and sent to [facility] ED.  He denies overt GI bleeding.  Found to be COVID positive.      # 25mm laterally spreading transverse colon polyp "possibly connected to additional 15mm sessile polyp" on colonoscopy 8/22/2022  # Acute on chronic anemia [last known baseline Hg 7.8 in March 2022 per [employer]]  # Iron deficiency  # Endoscopic findings c/f Mendez's esophagus  # Elevated BUN  # COVID positive    Recommendations:  -trend vitals, CBC, and monitor for clinical signs of bleeding  -maintain active type and screen  -transfusion goal to maintain hemoglobin >/= 8.0 and platelets >/= 50 [however patient's baseline Hg ~8]  -avoid NSAIDs  -please obtain results from recent bone marrow biopsy  -IV iron supplementation  -PPI IV BID while inpatient; if confirmed or suspected Mendez's, will need to be on chronic PPI therapy indefinitely  -once medically optimized and cleared by Cardiology given history, would consider endoscopy to rule out occult upper GI bleeding source, however patient without overt GI bleeding, Hg fluctuating but responding to pRBC transfusions appropriately, BUN stable, and patient wants to go home  -no acute indication for urgent colon polyp removal given brown stools, recommend outpatient follow up as scheduled with Dr. Brenner    Note incomplete until finalized by attending signature/attestation.    Christopher Beatty  GI/Hepatology Fellow    MONDAY-FRIDAY 8AM-5PM:  Pager# [phone] ([facility]) or [phone] ([facility])    NON-URGENT CONSULTS:  Please email [email or web address] OR [email or web address]  AT NIGHT AND ON WEEKENDS:  Contact on-call GI fellow via answering service ([phone]) from 5pm-8am and on weekends/holidays 87 year old male with history of HTN, HLD, CAD, AF on Eliquis, AAA s/p stent placement August 2022, CHF EF 35% s/p AICD, colon polyps, and chronic anemia who presents with chronic anemia.  Patient underwent EGD and colonoscopy at [place] with Dr. 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 Brenner for EMR.  Patient states he followed up with Heme/Onc for his chronic anemia who performed a bone marrow biopsy yesterday.  However, on labs that they ivan [he does not remember Hg], he was told his blood counts were low and sent to [facility] ED.  He denies overt GI bleeding.  Found to be COVID positive.      # 25mm laterally spreading transverse colon polyp "possibly connected to additional 15mm sessile polyp" on colonoscopy 8/22/2022  # Acute on chronic anemia [last known baseline Hg 7.8 in March 2022 per [employer]]  # Iron deficiency  # Endoscopic findings c/f Mendez's esophagus  # Elevated BUN  # COVID positive    Recommendations:  -trend vitals, CBC, and monitor for clinical signs of bleeding  -maintain active type and screen  -transfusion goal to maintain hemoglobin >/= 8.0 and platelets >/= 50 [however patient's baseline Hg ~8]  -avoid NSAIDs  -please obtain results from recent bone marrow biopsy  -IV iron supplementation  -PPI IV BID while inpatient; if confirmed or suspected Mendez's, will need to be on chronic PPI therapy indefinitely  -no immediately plan for endoscopy given patient without overt GI bleeding, Hg fluctuating but responding to pRBC transfusions appropriately, BUN stable, and patient wants to go home. if ongoing need for transfusions, patient amenable and otherwise medically optimized and cleared by Cardiology given history, can consider endoscopy to rule out occult upper GI bleeding source  -no acute indication for urgent colon polyp removal given brown stools, recommend outpatient follow up as scheduled with Dr. Brenner    Note incomplete until finalized by attending signature/attestation.    Christopher Beatty  GI/Hepatology Fellow    MONDAY-FRIDAY 8AM-5PM:  Pager# [phone] ([facility]) or [phone] ([facility])    NON-URGENT CONSULTS:  Please email [email or web address] OR [email or web address]  AT NIGHT AND ON WEEKENDS:  Contact on-call GI fellow via answering service ([phone]) from 5pm-8am and on weekends/holidays

## 2022-12-07 NOTE — PROGRESS NOTE ADULT - PROBLEM SELECTOR PROBLEM 5
COPD, mild
Bilateral Helical Rim Advancement Flap Text: The defect edges were debeveled with a #15 blade scalpel.  Given the location of the defect and the proximity to free margins (helical rim) a bilateral helical rim advancement flap was deemed most appropriate.  Using a sterile surgical marker, the appropriate advancement flaps were drawn incorporating the defect and placing the expected incisions between the helical rim and antihelix where possible.  The area thus outlined was incised through and through with a #15 scalpel blade.  With a skin hook and iris scissors, the flaps were gently and sharply undermined and freed up.

## 2022-12-07 NOTE — PROGRESS NOTE ADULT - PROBLEM SELECTOR PLAN 5
-symbicort BID  -proair prn

## 2022-12-07 NOTE — PROGRESS NOTE ADULT - PROBLEM SELECTOR PLAN 8
F-1.2 L F restriction  E-replete prn  N-DASH/TLC 1.2 L fluid restriction  hold AC for now

## 2022-12-07 NOTE — PROGRESS NOTE ADULT - PROBLEM SELECTOR PLAN 2
-recent EGD w/ Mendez's esophagus, no melena or black stool.  -no ulcers w/ bleeding  -not on PPI  -low concern for UGIB  -had c-scope that showed 2.5 cm transverse colonic polyp remaining, was to f/u w/Dr. Brenner.    -doubt active GI bleed, but if he is scheduled for inpt endoscopy, would need cardiac clearance
-recent EGD w/ Mendez's esophagus, no melena or black stool.  -no ulcers w/ bleeding  -not on PPI  -low concern for UGIB  -had c-scope that showed 2.5 cm transverse colonic polyp remaining, was to f/u w/Dr. Brenner.  Needs GI f/u visit given guaiac positivity for possible endoscopy as inpt
-recent EGD w/ Mendez's esophagus, no melena or black stool.  -no ulcers w/ bleeding  -not on PPI  -low concern for UGIB  -had c-scope that showed 2.5 cm transverse colonic polyp remaining, was to f/u w/Dr. Brenner.    -doubt active GI bleed, but if he is scheduled for inpt endoscopy, would need cardiac clearance
-recent EGD w/ Mendez's esophagus, no melena or black stool.  -no ulcers w/ bleeding  -not on PPI  -low concern for UGIB  -had c-scope that showed 2.5 cm transverse colonic polyp remaining, was to f/u w/ Dr. Brenner. Can email GI here to see if this is an inpatient eval or outpatient.

## 2022-12-07 NOTE — PROGRESS NOTE ADULT - PROBLEM SELECTOR PLAN 4
-currently euvolemic  -chest xray had mild pulm vasc congestion earlier  -fluid restrict 1.2 L per day  -strict ins and outs  -cont IV lasix

## 2022-12-07 NOTE — PROGRESS NOTE ADULT - PROBLEM SELECTOR PLAN 7
-can give flomax 0.4 mg daily

## 2022-12-07 NOTE — PROGRESS NOTE ADULT - PROBLEM SELECTOR PLAN 6
-had stent placed August 2022  -CTA abd/pelvis reviewed by vascular, no indication of active AAA leak
-had stent placed August 2022  -no abdominal pain, low concern for issue w/ AAA  -CT a/p w/ iv contrast preliminarily without any evidence for leak

## 2022-12-08 NOTE — PROGRESS NOTE ADULT - ATTENDING COMMENTS
Still without overt bleeding reported. NM bleeding scan obtained and negative for active bleeding. Hgb fluctuating, but overall appears to have stabilized near baseline. Patient eager to go home and not amenable at this time to non-emergent inpatient endoscopic evaluation. Continue PPI therapy. Outpatient colonoscopy by Dr. Brenner as previously planned.  Additional recommendations as above. Discussed with primary team ACP.
GI reconsulted for acute on chronic anemia without overt bleeding. Possibly slow, intermittent blood losses from GI tract, but suspect patient's anemia is likely multifactorial including AoCD. Though FOBT was performed and resulted positive, this test is of limited utility in a hospitalized patient and may simply reflect the presence of the known colon polyp. Respiratory symptoms have been improving, but patient remains on IV diuresis.   Would defer repeat endoscopic evaluation at this time; if Hgb continues to downtrend and if patient otherwise medically optimized, can consider repeat EGD to rule out potential upper GI source, but would defer colonoscopy with EMR to outpatient setting. Please resume PPI therapy as noted above.
No overt bleeding reported. Hgb fluctuating, but overall appears to have stabilized. Patient eager to go home and not currently amenable to EGD, but may consider if overt bleeding or need for repeat transfusions. Continue PPI therapy. Outpatient colonoscopy by Dr. Brenner as previously planned.  Additional recommendations as above.

## 2022-12-08 NOTE — PROGRESS NOTE ADULT - ASSESSMENT
87 year old male with history of HTN, HLD, CAD, AF on Eliquis, AAA s/p stent placement August 2022, CHF EF 35% s/p AICD, colon polyps, and chronic anemia who presents with chronic anemia.  Patient underwent EGD and colonoscopy at [place] with Dr. 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 Brenner for EMR.  Patient states he followed up with Heme/Onc for his chronic anemia who performed a bone marrow biopsy yesterday.  However, on labs that they ivan [he does not remember Hg], he was told his blood counts were low and sent to [facility] ED.  He denies overt GI bleeding.  Found to be COVID positive.      # 25mm laterally spreading transverse colon polyp "possibly connected to additional 15mm sessile polyp" on colonoscopy 8/22/2022  # Acute on chronic anemia [last known baseline Hg 7.8 in March 2022 per [employer]]  # Iron deficiency  # Endoscopic findings c/f Mendez's esophagus  # Elevated BUN  # COVID positive    Recommendations:  -trend vitals, CBC, and monitor for clinical signs of bleeding  -maintain active type and screen  -transfusion goal to maintain hemoglobin >/= 8.0 and platelets >/= 50 [however patient's baseline Hg ~8]  -avoid NSAIDs  -please obtain results from recent bone marrow biopsy  -IV iron supplementation  -PPI IV BID while inpatient; if confirmed or suspected Mendez's, will need to be on chronic PPI therapy indefinitely  -no immediately plan for endoscopy given patient without overt GI bleeding, patient having brown stool, negative NM bleeding scan and now febrile, on 2L oxygen, and COVID+  -no acute indication for urgent colon polyp removal given brown stools, recommend outpatient follow up as scheduled with Dr. Brenner  -will sign off at this time, please call back with questions or if new issues arise    Note incomplete until finalized by attending signature/attestation.    Christopher Beatty  GI/Hepatology Fellow    MONDAY-FRIDAY 8AM-5PM:  Pager# [phone] ([facility]) or [phone] ([facility])    NON-URGENT CONSULTS:  Please email [email or web address]  AT NIGHT AND ON WEEKENDS:  Contact on-call GI fellow via answering service ([phone]) from 5pm-8am and on weekends/holidays 87 year old male with history of HTN, HLD, CAD, AF on Eliquis, AAA s/p stent placement August 2022, CHF EF 35% s/p AICD, colon polyps, and chronic anemia who presents with chronic anemia.  Patient underwent EGD and colonoscopy at [place] with Dr. 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 Brenner for EMR.  Patient states he followed up with Heme/Onc for his chronic anemia who performed a bone marrow biopsy yesterday.  However, on labs that they ivan [he does not remember Hg], he was told his blood counts were low and sent to [facility] ED.  He denies overt GI bleeding.  Found to be COVID positive.      # 25mm laterally spreading transverse colon polyp "possibly connected to additional 15mm sessile polyp" on colonoscopy 8/22/2022  # Acute on chronic anemia [last known baseline Hg 7.8 in March 2022 per [employer]]  # Iron deficiency  # Endoscopic findings c/f Mendez's esophagus  # Elevated BUN  # COVID positive    Recommendations:  -trend vitals, CBC, and monitor for clinical signs of bleeding  -maintain active type and screen  -transfusion goal to maintain hemoglobin >/= 8.0 and platelets >/= 50 [however patient's baseline Hg ~8]  -avoid NSAIDs  -follow up results from recent bone marrow biopsy  -iron supplementation as indicated  -PPI IV BID while inpatient; if confirmed or suspected Mendez's, will need to be on chronic PPI therapy indefinitely  -no immediately plan for endoscopy given patient without overt GI bleeding, patient having brown stool, negative NM bleeding scan and now febrile, on 2L oxygen, and COVID+  -no acute indication for urgent colon polyp removal given brown stools, recommend outpatient follow up as scheduled with Dr. Brenner  -will sign off at this time, please call back with questions or if new issues arise    Note incomplete until finalized by attending signature/attestation.    Christopher Beatty  GI/Hepatology Fellow    MONDAY-FRIDAY 8AM-5PM:  Pager# [phone] ([facility]) or [phone] ([facility])    NON-URGENT CONSULTS:  Please email [email or web address]  AT NIGHT AND ON WEEKENDS:  Contact on-call GI fellow via answering service ([phone]) from 5pm-8am and on weekends/holidays

## 2022-12-08 NOTE — PROGRESS NOTE ADULT - PROVIDER SPECIALTY LIST ADULT
Infectious Disease
Gastroenterology
Infectious Disease
Infectious Disease
Gastroenterology
Gastroenterology
Internal Medicine

## 2022-12-08 NOTE — PROGRESS NOTE ADULT - ASSESSMENT
Patient is an 87 year M hx of chronic anemia, CAD, HTN, HLD, afib on eliquis, BPH, AAA (had saccular dilation w/ stent placement August 2022), CHF EF 35% on torsemide prn, colonic polyps w/ 2.5 cm remaining in transverse colon (to be evaled w/ Dr. Brenner outpt), recent endoscopy w/ Mendez's esophagus, who presents w/ anemia.  for blood transfusion  found to be covid 19 positive   Fever - noted isolated fever overnight, no afebrile, WBC wnl   feels well, no new sx or findings on exam; pt asx    plan  s/p remdesivir x 3d course completed 12/6  no hypoxia, on RA - steroids not indicated  ac-- pt with anemia, NM scan with no active GIB  isolation per infection control protocol  continue supportive care  transfuse as needed   monitor temps/WBC -- if spikes fever again, send blood cultures x2   rest of management per primary team      Arash Ellis M.D.  [facility], Division of Infectious Diseases  [phone]  After 5pm on weekdays and all day on weekends - please call [phone]   Patient is an 87 year M hx of chronic anemia, CAD, HTN, HLD, afib on eliquis, BPH, AAA (had saccular dilation w/ stent placement August 2022), CHF EF 35% on torsemide prn, colonic polyps w/ 2.5 cm remaining in transverse colon (to be evaled w/ Dr. Brenner outpt), recent endoscopy w/ Mendez's esophagus, who presents w/ anemia.  for blood transfusion  found to be covid 19 positive    s/p remdesivir x 3d course completed 12/6  Fever - noted isolated fever overnight, no afebrile, WBC wnl   now on NC 2L since last night, no complaints    plan  now on NC 2L - will order CXR  can continue off antibiotics   monitor temps/WBC    -- if spikes fever again, send blood cultures x2 and start empiric pip-tazo  isolation for covid per infection control protocol  continue supportive care  ac-- pt with anemia, NM scan with no active GIB  transfuse as needed   rest of management per primary team      Arash Ellis M.D.  \[facility]\"", Division of Infectious Diseases  [phone]  After 5pm on weekdays and all day on weekends - please call [phone]   Patient is an 87 year M hx of chronic anemia, CAD, HTN, HLD, afib on eliquis, BPH, AAA (had saccular dilation w/ stent placement August 2022), CHF EF 35% on torsemide prn, colonic polyps w/ 2.5 cm remaining in transverse colon (to be evaled w/ Dr. Brenner outpt), recent endoscopy w/ Mendez's esophagus, who presents w/ anemia.  for blood transfusion  found to be covid 19 positive    s/p remdesivir x 3d course completed 12/6  Fever - noted isolated fever overnight, no afebrile, WBC wnl   now on NC 2L since last night -- now noted back on RA  was transfused 1U PRBC overnight  no complaints at this time    plan  can continue off antibiotics   monitor temps/WBC    -- if spikes fever again, send blood cultures x2 and obtain CXR  isolation for covid per infection control protocol  continue supportive care  ac-- pt with anemia, NM scan with no active GIB  transfuse as needed   rest of management per primary team    D/w Dr. Morelia Ellis M.D.  [facility], Division of Infectious Diseases  [phone]  After 5pm on weekdays and all day on weekends - please call [phone]

## 2022-12-08 NOTE — PROGRESS NOTE ADULT - SUBJECTIVE AND OBJECTIVE BOX
Interval Events:   No acute events overnight, however Hg drop yesterday prompted another pRBC transfusion.  Patient without acute symptoms at this time.  He endorses improvement in dyspnea and having brown stools.    ROS:   12 point review of systems performed and negative except otherwise noted in HPI.    Hospital Medications:  acetaminophen     Tablet .. 650 milliGRAM(s) Oral every 6 hours PRN  albuterol    90 MICROgram(s) HFA Inhaler 2 Puff(s) Inhalation every 6 hours PRN  ascorbic acid 500 milliGRAM(s) Oral daily  budesonide 160 MICROgram(s)/formoterol 4.5 MICROgram(s) Inhaler 2 Puff(s) Inhalation two times a day  carvedilol 3.125 milliGRAM(s) Oral every 12 hours  cholecalciferol 2000 Unit(s) Oral daily  furosemide   Injectable 20 milliGRAM(s) IV Push daily  loratadine 10 milliGRAM(s) Oral daily  losartan 25 milliGRAM(s) Oral every 24 hours  montelukast 10 milliGRAM(s) Oral at bedtime  remdesivir  IVPB 100 milliGRAM(s) IV Intermittent every 24 hours  remdesivir  IVPB   IV Intermittent   spironolactone 12.5 milliGRAM(s) Oral daily  tamsulosin 0.4 milliGRAM(s) Oral at bedtime      PHYSICAL EXAM:   Vital Signs:  Vital Signs Last 24 Hrs  T(C): 36.4 (06 Dec 2022 05:41), Max: 36.8 (05 Dec 2022 17:21)  T(F): 97.6 (06 Dec 2022 05:41), Max: 98.2 (05 Dec 2022 17:21)  HR: 80 (06 Dec 2022 05:41) (78 - 80)  BP: 110/75 (06 Dec 2022 05:41) (97/61 - 121/67)  BP(mean): --  RR: 17 (06 Dec 2022 05:41) (17 - 18)  SpO2: 96% (06 Dec 2022 05:41) (93% - 97%)    Parameters below as of 06 Dec 2022 05:41  Patient On (Oxygen Delivery Method): room air      Daily     Daily     GENERAL: no acute distress  NEURO: alert  HEENT: NCAT, no conjunctival pallor appreciated  CHEST: no respiratory distress, no accessory muscle use  CARDIAC: regular rate, +S1/S2  ABDOMEN: soft, nontender, no rebound or guarding  RECTAL: brown stool present  EXTREMITIES: warm, well perfused  SKIN: no lesions noted    LABS: reviewed                        7.4    4.00  )-----------( 137      ( 06 Dec 2022 06:05 )             25.3     12-06    135  |  100  |  25<H>  ----------------------------<  85  4.1   |  26  |  0.89    Ca    10.2      06 Dec 2022 06:05  Phos  3.2     12-05  Mg     1.80     12-05    TPro  6.0  /  Alb  3.6  /  TBili  0.8  /  DBili  x   /  AST  23  /  ALT  14  /  AlkPhos  156<H>  12-06    LIVER FUNCTIONS - ( 06 Dec 2022 06:05 )  Alb: 3.6 g/dL / Pro: 6.0 g/dL / ALK PHOS: 156 U/L / ALT: 14 U/L / AST: 23 U/L / GGT: x             Interval Diagnostic Studies: see sunrise for full report  
Interval Events:   No acute events overnight.  Patient without acute symptoms at this time.  Patient continues to have normal brown stools.    ROS:   12 point review of systems performed and negative except otherwise noted in HPI.    Hospital Medications:  acetaminophen     Tablet .. 650 milliGRAM(s) Oral every 6 hours PRN  albuterol    90 MICROgram(s) HFA Inhaler 2 Puff(s) Inhalation every 6 hours PRN  artificial tears (preservative free) Ophthalmic Solution 1 Drop(s) Both EYES every 6 hours PRN  ascorbic acid 500 milliGRAM(s) Oral daily  budesonide 160 MICROgram(s)/formoterol 4.5 MICROgram(s) Inhaler 2 Puff(s) Inhalation two times a day  carvedilol 3.125 milliGRAM(s) Oral every 12 hours  cholecalciferol 2000 Unit(s) Oral daily  fluticasone propionate 50 MICROgram(s)/spray Nasal Spray 1 Spray(s) Both Nostrils two times a day  furosemide   Injectable 20 milliGRAM(s) IV Push daily  glycopyrrolate 1 milliGRAM(s) Oral two times a day  loratadine 10 milliGRAM(s) Oral daily  losartan 25 milliGRAM(s) Oral every 24 hours  montelukast 10 milliGRAM(s) Oral at bedtime  remdesivir  IVPB 100 milliGRAM(s) IV Intermittent every 24 hours  remdesivir  IVPB   IV Intermittent   spironolactone 12.5 milliGRAM(s) Oral daily  tamsulosin 0.4 milliGRAM(s) Oral at bedtime      PHYSICAL EXAM:   Vital Signs:  Vital Signs Last 24 Hrs  T(C): 36.7 (07 Dec 2022 05:50), Max: 37.3 (07 Dec 2022 01:05)  T(F): 98.1 (07 Dec 2022 05:50), Max: 99.1 (07 Dec 2022 01:05)  HR: 82 (07 Dec 2022 05:50) (78 - 82)  BP: 97/62 (07 Dec 2022 05:50) (97/62 - 124/62)  BP(mean): --  RR: 18 (07 Dec 2022 05:50) (18 - 18)  SpO2: 100% (07 Dec 2022 05:50) (93% - 100%)    Parameters below as of 07 Dec 2022 05:50  Patient On (Oxygen Delivery Method): room air      Daily     Daily Weight in k.5 (06 Dec 2022 11:55)    GENERAL: no acute distress  NEURO: alert  HEENT: NCAT, no conjunctival pallor appreciated  CHEST: no respiratory distress, no accessory muscle use  CARDIAC: regular rate, +S1/S2  ABDOMEN: soft, nontender, no rebound or guarding  EXTREMITIES: warm, well perfused  SKIN: no lesions noted    LABS: reviewed                        8.2    5.97  )-----------( 138      ( 07 Dec 2022 07:00 )             27.3         137  |  102  |  25<H>  ----------------------------<  97  4.1   |  26  |  0.97    Ca    10.1      07 Dec 2022 07:00  Phos  2.7       Mg     1.70         TPro  6.0  /  Alb  3.6  /  TBili  0.8  /  DBili  x   /  AST  23  /  ALT  14  /  AlkPhos  156<H>      LIVER FUNCTIONS - ( 06 Dec 2022 06:05 )  Alb: 3.6 g/dL / Pro: 6.0 g/dL / ALK PHOS: 156 U/L / ALT: 14 U/L / AST: 23 U/L / GGT: x             Interval Diagnostic Studies: see sunrise for full report  
[facility]  JH Crockett Y. Patel, S. Shah, G. Casimir  and providing coverage with Jorge Luis Christensen MD  Providing Infectious Disease Consultations at [facility], [facility]'s      Office# [phone] to schedule follow up appointments  Answering Service for urgent calls or New Consults [phone]    Infectious Diseases Progress Note:  LAUREL HAM is a 87y year old Male patient    COVID-19 Patient  Notes reviewed  No concerning events overnight.   Allergies: codeine (Nausea)  hazelnuts, facial swelling (Other)    ANTIBIOTICS/RELEVANT:  Current Antimicrobials:  remdesivir  IVPB 100 milliGRAM(s) IV Intermittent every 24 hours  remdesivir  IVPB   IV Intermittent     Prior/Completed Antimicrobials:  remdesivir  IVPB    Other Meds: acetaminophen     Tablet .. 650 milliGRAM(s) Oral every 6 hours PRN  albuterol    90 MICROgram(s) HFA Inhaler 2 Puff(s) Inhalation every 6 hours PRN  ascorbic acid 500 milliGRAM(s) Oral daily  budesonide 160 MICROgram(s)/formoterol 4.5 MICROgram(s) Inhaler 2 Puff(s) Inhalation two times a day  carvedilol 3.125 milliGRAM(s) Oral every 12 hours  cholecalciferol 2000 Unit(s) Oral daily  furosemide   Injectable 20 milliGRAM(s) IV Push daily  loratadine 10 milliGRAM(s) Oral daily  losartan 25 milliGRAM(s) Oral every 24 hours  montelukast 10 milliGRAM(s) Oral at bedtime  spironolactone 12.5 milliGRAM(s) Oral daily  tamsulosin 0.4 milliGRAM(s) Oral at bedtime    Immunologic:   Objective:  Vital Signs Last 24 Hrs  T(F): 98.1 (06 Dec 2022 11:55), Max: 98.2 (05 Dec 2022 17:21)  HR: 79 (06 Dec 2022 11:55) (79 - 80)  BP: 118/81 (06 Dec 2022 11:55) (106/71 - 119/78)  RR: 18 (06 Dec 2022 11:55) (17 - 18)  SpO2: 93% (06 Dec 2022 11:55) (93% - 97%)  PHYSICAL EXAM:  General: no acute distress  HEENT: NC/AT, anicteric, supple  Respiratory: no acc muscle use, breathing comfortably  Cardiovascular: S1 S2 present, normal rate  Gastrointestinal: normal appearing, nondistended  Extremities: no edema, no cyanosis    LABS:                        8.6    4.73  )-----------( 149      ( 06 Dec 2022 14:35 )             28.4     WBC trend:  4.73 12-06 @ 14:35  4.00 12-06 @ 06:05  4.09 12-05 @ 21:29  3.98 12-05 @ 08:40  3.73 12-05 @ 05:14  5.07 12-04 @ 17:00  4.33 12-03 @ 07:06  4.07 12-02 @ 16:00    12-06    135  |  100  |  25<H>  ----------------------------<  85  4.1   |  26  |  0.89    Ca    10.2      06 Dec 2022 06:05  Phos  3.2     12-05  Mg     1.80     12-05    TPro  6.0  /  Alb  3.6  /  TBili  0.8  /  DBili  x   /  AST  23  /  ALT  14  /  AlkPhos  156<H>  12-06    Creatinine, Serum: 0.89 mg/dL (12-06-22 @ 06:05)  Creatinine, Serum: 0.91 mg/dL (12-05-22 @ 05:14)  Creatinine, Serum: 0.92 mg/dL (12-05-22 @ 05:14)  Creatinine, Serum: 0.99 mg/dL (12-04-22 @ 17:00)  Creatinine, Serum: 1.04 mg/dL (12-03-22 @ 07:06)  Creatinine, Serum: 0.96 mg/dL (12-02-22 @ 16:00)        Auto Neutrophil #: 2.62 K/uL (12-05-22 @ 08:40)  Auto Neutrophil #: 3.07 K/uL (12-03-22 @ 07:06)  Auto Neutrophil #: 2.72 K/uL (12-02-22 @ 16:00)    Auto Lymphocyte #: 0.75 K/uL (12-05-22 @ 08:40)  Auto Lymphocyte #: 0.64 K/uL (12-03-22 @ 07:06)  Auto Lymphocyte #: 0.74 K/uL (12-02-22 @ 16:00)    Ferritin, Serum: 31 ng/mL (12-02-22 @ 22:00)    Creatine Kinase, Serum: 73 U/L (12-02-22 @ 22:00)    CPK Mass Assay %: 6.3 % (12-02-22 @ 22:00)    Prothrombin Time, Plasma: 16.9 sec (12-05-22 @ 05:14)  Prothrombin Time, Plasma: 24.9 sec (12-02-22 @ 16:00)    Activated Partial Thromboplastin Time: 42.5 sec (12-02-22 @ 16:00)    MICROBIOLOGY: reviewed  SARS-CoV-2 Result: Detected (02 Dec 2022 16:07)    RADIOLOGY & ADDITIONAL STUDIES: reviewed
Interval Events:   Febrile overnight.  Patient ambulating in room without acute symptoms at this time.  NM bleeding scan ordered by primary team and negative.    ROS:   12 point review of systems performed and negative except otherwise noted in HPI.    Hospital Medications:  acetaminophen     Tablet .. 650 milliGRAM(s) Oral every 6 hours PRN  albuterol    90 MICROgram(s) HFA Inhaler 2 Puff(s) Inhalation every 6 hours PRN  artificial tears (preservative free) Ophthalmic Solution 1 Drop(s) Both EYES every 6 hours PRN  ascorbic acid 500 milliGRAM(s) Oral daily  budesonide 160 MICROgram(s)/formoterol 4.5 MICROgram(s) Inhaler 2 Puff(s) Inhalation two times a day  carvedilol 3.125 milliGRAM(s) Oral every 12 hours  cholecalciferol 2000 Unit(s) Oral daily  fluticasone propionate 50 MICROgram(s)/spray Nasal Spray 1 Spray(s) Both Nostrils two times a day  furosemide   Injectable 20 milliGRAM(s) IV Push daily  glycopyrrolate 1 milliGRAM(s) Oral two times a day  loratadine 10 milliGRAM(s) Oral daily  losartan 25 milliGRAM(s) Oral every 24 hours  montelukast 10 milliGRAM(s) Oral at bedtime  remdesivir  IVPB 100 milliGRAM(s) IV Intermittent every 24 hours  remdesivir  IVPB   IV Intermittent   spironolactone 12.5 milliGRAM(s) Oral daily  tamsulosin 0.4 milliGRAM(s) Oral at bedtime      PHYSICAL EXAM:   Vital Signs:  Vital Signs Last 24 Hrs  T(C): 36.3 (08 Dec 2022 05:00), Max: 38.3 (07 Dec 2022 21:20)  T(F): 97.4 (08 Dec 2022 05:00), Max: 101 (07 Dec 2022 21:20)  HR: 82 (08 Dec 2022 05:00) (80 - 87)  BP: 103/63 (08 Dec 2022 05:00) (98/63 - 111/66)  BP(mean): --  RR: 18 (08 Dec 2022 05:00) (18 - 18)  SpO2: 100% (08 Dec 2022 05:00) (96% - 100%)    Parameters below as of 08 Dec 2022 05:00  Patient On (Oxygen Delivery Method): nasal cannula  O2 Flow (L/min): 2    Daily     Daily Weight in k.5 (07 Dec 2022 11:35)    GENERAL: no acute distress  NEURO: alert  HEENT: NCAT, no conjunctival pallor appreciated  CHEST: no respiratory distress, no accessory muscle use  CARDIAC: regular rate, +S1/S2  ABDOMEN: soft, nontender, no rebound or guarding  EXTREMITIES: warm, well perfused  SKIN: no lesions noted    LABS: reviewed                        7.9    6.91  )-----------( 127      ( 07 Dec 2022 22:33 )             25.8     12-08    135  |  98  |  28<H>  ----------------------------<  116<H>  3.8   |  27  |  1.02    Ca    10.2      08 Dec 2022 07:10  Phos  3.5     12-08  Mg     1.80     12-08          Interval Diagnostic Studies: see sunrise for full report  
[facility]  JH Crockett Y. Patel, JACEK Jimenez  and providing coverage with Jorge Luis Christensen MD  Providing Infectious Disease Consultations at [facility], [facility]'s      Office# [phone] to schedule follow up appointments  Answering Service for urgent calls or New Consults [phone]    Infectious Diseases Progress Note:  LAUREL HAM is a 87y year old Male patient    COVID-19 Patient  Had fever to 101F overnight, denies feeling fever/chills  States has not had any fevers since then and feels well  Denies pain, cough, dyspnea, abd pain, n/v/d, dysuria  Notes reviewed  No concerning events overnight.   Allergies: codeine (Nausea)  hazelnuts, facial swelling (Other)    ANTIBIOTICS/RELEVANT:  Current Antimicrobials:    Prior/Completed Antimicrobials:  remdesivir  IVPB  remdesivir  IVPB  remdesivir  IVPB    Other Meds: acetaminophen     Tablet .. 650 milliGRAM(s) Oral every 6 hours PRN  albuterol    90 MICROgram(s) HFA Inhaler 2 Puff(s) Inhalation every 6 hours PRN  artificial tears (preservative free) Ophthalmic Solution 1 Drop(s) Both EYES every 6 hours PRN  ascorbic acid 500 milliGRAM(s) Oral daily  budesonide 160 MICROgram(s)/formoterol 4.5 MICROgram(s) Inhaler 2 Puff(s) Inhalation two times a day  carvedilol 3.125 milliGRAM(s) Oral every 12 hours  cholecalciferol 2000 Unit(s) Oral daily  fluticasone propionate 50 MICROgram(s)/spray Nasal Spray 1 Spray(s) Both Nostrils two times a day  furosemide   Injectable 20 milliGRAM(s) IV Push daily  glycopyrrolate 1 milliGRAM(s) Oral two times a day  loratadine 10 milliGRAM(s) Oral daily  losartan 25 milliGRAM(s) Oral every 24 hours  montelukast 10 milliGRAM(s) Oral at bedtime  spironolactone 12.5 milliGRAM(s) Oral daily  tamsulosin 0.4 milliGRAM(s) Oral at bedtime    Immunologic:   Objective:  Vital Signs Last 24 Hrs  T(F): 99.2 (08 Dec 2022 13:09), Max: 101 (07 Dec 2022 21:20)  HR: 79 (08 Dec 2022 13:09) (79 - 87)  BP: 104/68 (08 Dec 2022 13:09) (98/63 - 111/66)  RR: 18 (08 Dec 2022 13:09) (18 - 18)  SpO2: 92% (08 Dec 2022 13:38) (88% - 100%)  PHYSICAL EXAM:  General: no acute distress, nontoxic appearing  HEENT: NC/AT, anicteric, supple  Respiratory: no acc muscle use, breathing comfortably  Cardiovascular: S1 S2 present, normal rate  Gastrointestinal: normal appearing, nondistended  Extremities: no edema, no cyanosis    LABS:                        8.6    7.57  )-----------( 124      ( 08 Dec 2022 07:10 )             28.7     WBC trend:  7.57 12-08 @ 07:10  6.91 12-07 @ 22:33  5.97 12-07 @ 07:00  5.23 12-06 @ 22:40  4.73 12-06 @ 14:35  4.00 12-06 @ 06:05  4.09 12-05 @ 21:29  3.98 12-05 @ 08:40  3.73 12-05 @ 05:14  5.07 12-04 @ 17:00  4.33 12-03 @ 07:06  4.07 12-02 @ 16:00    12-08    135  |  98  |  28<H>  ----------------------------<  116<H>  3.8   |  27  |  1.02    Ca    10.2      08 Dec 2022 07:10  Phos  3.5     12-08  Mg     1.80     12-08      Creatinine, Serum: 1.02 mg/dL (12-08-22 @ 07:10)  Creatinine, Serum: 0.97 mg/dL (12-07-22 @ 07:00)  Creatinine, Serum: 0.89 mg/dL (12-06-22 @ 06:05)  Creatinine, Serum: 0.91 mg/dL (12-05-22 @ 05:14)  Creatinine, Serum: 0.92 mg/dL (12-05-22 @ 05:14)  Creatinine, Serum: 0.99 mg/dL (12-04-22 @ 17:00)  Creatinine, Serum: 1.04 mg/dL (12-03-22 @ 07:06)  Creatinine, Serum: 0.96 mg/dL (12-02-22 @ 16:00)    Auto Neutrophil #: 2.62 K/uL (12-05-22 @ 08:40)  Auto Neutrophil #: 3.07 K/uL (12-03-22 @ 07:06)  Auto Neutrophil #: 2.72 K/uL (12-02-22 @ 16:00)    Auto Lymphocyte #: 0.75 K/uL (12-05-22 @ 08:40)  Auto Lymphocyte #: 0.64 K/uL (12-03-22 @ 07:06)  Auto Lymphocyte #: 0.74 K/uL (12-02-22 @ 16:00)    Ferritin, Serum: 31 ng/mL (12-02-22 @ 22:00)    Creatine Kinase, Serum: 73 U/L (12-02-22 @ 22:00)    Prothrombin Time, Plasma: 16.9 sec (12-05-22 @ 05:14)  Prothrombin Time, Plasma: 24.9 sec (12-02-22 @ 16:00)    Activated Partial Thromboplastin Time: 42.5 sec (12-02-22 @ 16:00)    MICROBIOLOGY: reviewed  SARS-CoV-2 Result: Detected (02 Dec 2022 16:07)    RADIOLOGY & ADDITIONAL STUDIES: reviewed
[facility]  JH Crockett Y. Patel, S. Shah, G. Casimir  and providing coverage with Jorge Luis Christensen MD  Providing Infectious Disease Consultations at [facility], [facility]'s      Office# [phone] to schedule follow up appointments  Answering Service for urgent calls or New Consults [phone]    Infectious Diseases Progress Note:  LAUREL HAM is a 87y year old Male patient    COVID-19 Patient  Resting comfortably  no new complaints noted   Notes reviewed  No concerning events overnight.   Allergies: codeine (Nausea)  hazelnuts, facial swelling (Other)    ANTIBIOTICS/RELEVANT:  Current Antimicrobials:  remdesivir  IVPB 100 milliGRAM(s) IV Intermittent every 24 hours  remdesivir  IVPB   IV Intermittent     Prior/Completed Antimicrobials:  remdesivir  IVPB    Other Meds: acetaminophen     Tablet .. 650 milliGRAM(s) Oral every 6 hours PRN  albuterol    90 MICROgram(s) HFA Inhaler 2 Puff(s) Inhalation every 6 hours PRN  artificial tears (preservative free) Ophthalmic Solution 1 Drop(s) Both EYES every 6 hours PRN  ascorbic acid 500 milliGRAM(s) Oral daily  budesonide 160 MICROgram(s)/formoterol 4.5 MICROgram(s) Inhaler 2 Puff(s) Inhalation two times a day  carvedilol 3.125 milliGRAM(s) Oral every 12 hours  cholecalciferol 2000 Unit(s) Oral daily  fluticasone propionate 50 MICROgram(s)/spray Nasal Spray 1 Spray(s) Both Nostrils two times a day  furosemide   Injectable 20 milliGRAM(s) IV Push daily  glycopyrrolate 1 milliGRAM(s) Oral two times a day  loratadine 10 milliGRAM(s) Oral daily  losartan 25 milliGRAM(s) Oral every 24 hours  montelukast 10 milliGRAM(s) Oral at bedtime  spironolactone 12.5 milliGRAM(s) Oral daily  tamsulosin 0.4 milliGRAM(s) Oral at bedtime    Immunologic:   Objective:  Vital Signs Last 24 Hrs  T(F): 98.1 (07 Dec 2022 05:50), Max: 99.1 (07 Dec 2022 01:05)  HR: 82 (07 Dec 2022 05:50) (78 - 82)  BP: 97/62 (07 Dec 2022 05:50) (97/62 - 124/62)  RR: 18 (07 Dec 2022 05:50) (18 - 18)  SpO2: 100% (07 Dec 2022 05:50) (93% - 100%)  PHYSICAL EXAM:  General: no acute distress  HEENT: NC/AT, anicteric, supple  Respiratory: no acc muscle use, breathing comfortably  Cardiovascular: S1 S2 present, normal rate  Gastrointestinal: normal appearing, nondistended  Extremities: no edema, no cyanosis    LABS:                        8.2    5.97  )-----------( 138      ( 07 Dec 2022 07:00 )             27.3     WBC trend:  5.97 12-07 @ 07:00  5.23 12-06 @ 22:40  4.73 12-06 @ 14:35  4.00 12-06 @ 06:05  4.09 12-05 @ 21:29  3.98 12-05 @ 08:40  3.73 12-05 @ 05:14  5.07 12-04 @ 17:00  4.33 12-03 @ 07:06  4.07 12-02 @ 16:00    12-07    137  |  102  |  25<H>  ----------------------------<  97  4.1   |  26  |  0.97    Ca    10.1      07 Dec 2022 07:00  Phos  2.7     12-07  Mg     1.70     12-07    TPro  6.0  /  Alb  3.6  /  TBili  0.8  /  DBili  x   /  AST  23  /  ALT  14  /  AlkPhos  156<H>  12-06    Creatinine, Serum: 0.97 mg/dL (12-07-22 @ 07:00)  Creatinine, Serum: 0.89 mg/dL (12-06-22 @ 06:05)  Creatinine, Serum: 0.91 mg/dL (12-05-22 @ 05:14)  Creatinine, Serum: 0.92 mg/dL (12-05-22 @ 05:14)  Creatinine, Serum: 0.99 mg/dL (12-04-22 @ 17:00)  Creatinine, Serum: 1.04 mg/dL (12-03-22 @ 07:06)  Creatinine, Serum: 0.96 mg/dL (12-02-22 @ 16:00)    Auto Neutrophil #: 2.62 K/uL (12-05-22 @ 08:40)  Auto Neutrophil #: 3.07 K/uL (12-03-22 @ 07:06)  Auto Neutrophil #: 2.72 K/uL (12-02-22 @ 16:00)    Auto Lymphocyte #: 0.75 K/uL (12-05-22 @ 08:40)  Auto Lymphocyte #: 0.64 K/uL (12-03-22 @ 07:06)  Auto Lymphocyte #: 0.74 K/uL (12-02-22 @ 16:00)    Ferritin, Serum: 31 ng/mL (12-02-22 @ 22:00)  Creatine Kinase, Serum: 73 U/L (12-02-22 @ 22:00)    Prothrombin Time, Plasma: 16.9 sec (12-05-22 @ 05:14)  Prothrombin Time, Plasma: 24.9 sec (12-02-22 @ 16:00)    Activated Partial Thromboplastin Time: 42.5 sec (12-02-22 @ 16:00)    MICROBIOLOGY: reviewed  SARS-CoV-2 Result: Detected (02 Dec 2022 16:07)        RADIOLOGY & ADDITIONAL STUDIES: reviewed
Paced rhythm on tele  No fevers overnight  Saturating high 90s on 2LNCO2    Vital Signs Last 24 Hrs  T(C): 36.4 (04 Dec 2022 06:57), Max: 36.4 (04 Dec 2022 06:57)  T(F): 97.5 (04 Dec 2022 06:57), Max: 97.5 (04 Dec 2022 06:57)  HR: 81 (04 Dec 2022 06:57) (80 - 85)  BP: 104/66 (04 Dec 2022 06:57) (104/66 - 117/67)  BP(mean): --  RR: 20 (04 Dec 2022 06:57) (20 - 20)  SpO2: 100% (04 Dec 2022 06:57) (100% - 100%)    I&O's Summary    12-03-22 @ 07:01  -  12-04-22 @ 07:00  --------------------------------------------------------  IN: 0 mL / OUT: 1075 mL / NET: -1075 mL        GENERAL: NAD, well-developed  HEAD:  Atraumatic, Normocephalic  EYES: EOMI, PERRLA, conjunctiva and sclera clear  NECK: Supple, No JVD  CHEST/LUNG: Clear to auscultation bilaterally; No wheeze  HEART: Regular rate and rhythm; No murmurs, rubs, or gallops  ABDOMEN: Soft, Nontender, Nondistended; Bowel sounds present  EXTREMITIES:  2+ Peripheral Pulses, No clubbing, cyanosis, or edema  PSYCH: AAOx3  NEUROLOGY: non-focal  SKIN: No rashes or lesions    LABS:                        7.8    4.33  )-----------( 172      ( 03 Dec 2022 07:06 )             26.8     12-03    138  |  102  |  29<H>  ----------------------------<  114<H>  4.6   |  26  |  1.04    Ca    10.8<H>      03 Dec 2022 07:06  Phos  3.0     12-03  Mg     2.30     12-03    TPro  6.9  /  Alb  4.2  /  TBili  2.3<H>  /  DBili  x   /  AST  29  /  ALT  17  /  AlkPhos  177<H>  12-03    PT/INR - ( 02 Dec 2022 16:00 )   PT: 24.9 sec;   INR: 2.13 ratio         PTT - ( 02 Dec 2022 16:00 )  PTT:42.5 sec  CAPILLARY BLOOD GLUCOSE        CARDIAC MARKERS ( 02 Dec 2022 22:00 )  x     / x     / 73 U/L / x     / 4.6 ng/mL          RADIOLOGY & ADDITIONAL TESTS:    Imaging Personally Reviewed:  [x] YES  [ ] NO    Will obtain old records:  [ ] YES  [x] NO
He denies any BRPBR or melena  (+)guaiac   Repeat Hgb 6.9 this morning    Vital Signs Last 24 Hrs  T(C): 36.3 (05 Dec 2022 14:20), Max: 36.9 (04 Dec 2022 22:29)  T(F): 97.4 (05 Dec 2022 14:20), Max: 98.4 (04 Dec 2022 22:29)  HR: 78 (05 Dec 2022 14:20) (78 - 88)  BP: 105/68 (05 Dec 2022 14:20) (97/61 - 125/83)  BP(mean): --  RR: 18 (05 Dec 2022 14:20) (17 - 19)  SpO2: 95% (05 Dec 2022 14:20) (95% - 100%)    I&O's Summary        GENERAL: NAD, well-developed  HEAD:  Atraumatic, Normocephalic  EYES: EOMI, PERRLA, conjunctiva and sclera clear  NECK: Supple, No JVD  CHEST/LUNG: Clear to auscultation bilaterally; No wheeze  HEART: Regular rate and rhythm; No murmurs, rubs, or gallops  ABDOMEN: Soft, Nontender, Nondistended; Bowel sounds present  EXTREMITIES:  2+ Peripheral Pulses, No clubbing, cyanosis, or edema  PSYCH: AAOx3  NEUROLOGY: non-focal, speech/comprehension are intact    LABS:                        6.9    3.98  )-----------( 146      ( 05 Dec 2022 08:40 )             24.1     12-05    137  |  102  |  30<H>  ----------------------------<  105<H>  3.8   |  28  |  0.91    Ca    10.2      05 Dec 2022 05:14  Phos  3.2     12-05  Mg     1.80     12-05    TPro  5.7<L>  /  Alb  3.3  /  TBili  0.6  /  DBili  0.3  /  AST  20  /  ALT  13  /  AlkPhos  148<H>  12-05    PT/INR - ( 05 Dec 2022 05:14 )   PT: 16.9 sec;   INR: 1.45 ratio           CAPILLARY BLOOD GLUCOSE                RADIOLOGY & ADDITIONAL TESTS:    Imaging Personally Reviewed:  [x] YES  [ ] NO    Will obtain old records:  [ ] YES  [x] NO    
SUBJECTIVE/ OVERNIGHT EVENTS:  No events.  Feel well.  no complaints.   no cp, no sob, no n/v/d.  no abd pain.   hgb stable  no melena, no hematochezia. no BRBPR.       --------------------------------------------------------------------------------------------  LABS:                        8.2    5.97  )-----------( 138      ( 07 Dec 2022 07:00 )             27.3     12-07    137  |  102  |  25<H>  ----------------------------<  97  4.1   |  26  |  0.97    Ca    10.1      07 Dec 2022 07:00  Phos  2.7     12-07  Mg     1.70     12-07    TPro  6.0  /  Alb  3.6  /  TBili  0.8  /  DBili  x   /  AST  23  /  ALT  14  /  AlkPhos  156<H>  12-06      CAPILLARY BLOOD GLUCOSE                RADIOLOGY & ADDITIONAL TESTS:    Imaging Personally Reviewed:  [x] YES  [ ] NO    Consultant(s) Notes Reviewed:  [x] YES  [ ] NO    MEDICATIONS  (STANDING):  ascorbic acid 500 milliGRAM(s) Oral daily  budesonide 160 MICROgram(s)/formoterol 4.5 MICROgram(s) Inhaler 2 Puff(s) Inhalation two times a day  carvedilol 3.125 milliGRAM(s) Oral every 12 hours  cholecalciferol 2000 Unit(s) Oral daily  fluticasone propionate 50 MICROgram(s)/spray Nasal Spray 1 Spray(s) Both Nostrils two times a day  furosemide   Injectable 20 milliGRAM(s) IV Push daily  glycopyrrolate 1 milliGRAM(s) Oral two times a day  loratadine 10 milliGRAM(s) Oral daily  losartan 25 milliGRAM(s) Oral every 24 hours  montelukast 10 milliGRAM(s) Oral at bedtime  remdesivir  IVPB 100 milliGRAM(s) IV Intermittent every 24 hours  remdesivir  IVPB   IV Intermittent   spironolactone 12.5 milliGRAM(s) Oral daily  tamsulosin 0.4 milliGRAM(s) Oral at bedtime    MEDICATIONS  (PRN):  acetaminophen     Tablet .. 650 milliGRAM(s) Oral every 6 hours PRN Temp greater or equal to 38C (100.4F), Mild Pain (1 - 3)  albuterol    90 MICROgram(s) HFA Inhaler 2 Puff(s) Inhalation every 6 hours PRN Shortness of Breath and/or Wheezing  artificial tears (preservative free) Ophthalmic Solution 1 Drop(s) Both EYES every 6 hours PRN Dry Eyes      Care Discussed with Consultants/Other Providers [x] YES  [ ] NO    Vital Signs Last 24 Hrs  T(C): 38.3 (07 Dec 2022 21:20), Max: 38.3 (07 Dec 2022 21:20)  T(F): 101 (07 Dec 2022 21:20), Max: 101 (07 Dec 2022 21:20)  HR: 80 (07 Dec 2022 21:20) (78 - 85)  BP: 111/66 (07 Dec 2022 21:20) (97/62 - 118/80)  BP(mean): --  RR: 18 (07 Dec 2022 21:20) (18 - 18)  SpO2: 96% (07 Dec 2022 21:20) (96% - 100%)    Parameters below as of 07 Dec 2022 21:20  Patient On (Oxygen Delivery Method): nasal cannula  O2 Flow (L/min): 2    I&O's Summary            PHYSICAL EXAM:  GENERAL: NAD, well-developed on room air  HEAD:  Atraumatic, Normocephalic  EYES: EOMI, PERRLA, conjunctiva and sclera clear  NECK: Supple, No JVD  CHEST/LUNG: Clear to auscultation bilaterally; No wheeze  HEART: Regular rate and rhythm; No murmurs, rubs, or gallops  ABDOMEN: Soft, Nontender, Nondistended; Bowel sounds present  EXTREMITIES:  2+ Peripheral Pulses, No clubbing, cyanosis, or edema  PSYCH: AAOx3, appropriate mood   NEUROLOGY: non-focal, speech/comprehension are intact
He denies any BRPBR or melena  No acute SOB  Spoke with his outside hematologist, Dr. Nielson who told me preliminary BMBx without any acute abnormalities, formal report still pending        Vital Signs Last 24 Hrs  T(C): 36.3 (05 Dec 2022 14:20), Max: 36.9 (04 Dec 2022 22:29)  T(F): 97.4 (05 Dec 2022 14:20), Max: 98.4 (04 Dec 2022 22:29)  HR: 78 (05 Dec 2022 14:20) (78 - 88)  BP: 105/68 (05 Dec 2022 14:20) (97/61 - 125/83)  BP(mean): --  RR: 18 (05 Dec 2022 14:20) (17 - 19)  SpO2: 95% (05 Dec 2022 14:20) (95% - 100%)    I&O's Summary        GENERAL: NAD, well-developed  HEAD:  Atraumatic, Normocephalic  EYES: EOMI, PERRLA, conjunctiva and sclera clear  NECK: Supple, No JVD  CHEST/LUNG: Clear to auscultation bilaterally; No wheeze  HEART: Regular rate and rhythm; No murmurs, rubs, or gallops  ABDOMEN: Soft, Nontender, Nondistended; Bowel sounds present  EXTREMITIES:  2+ Peripheral Pulses, No clubbing, cyanosis, or edema  PSYCH: AAOx3  NEUROLOGY: non-focal, speech/comprehension are intact      LABS:                        7.4    4.00  )-----------( 137      ( 06 Dec 2022 06:05 )             25.3     12-06    135  |  100  |  25<H>  ----------------------------<  85  4.1   |  26  |  0.89    Ca    10.2      06 Dec 2022 06:05  Phos  3.2     12-05  Mg     1.80     12-05    TPro  6.0  /  Alb  3.6  /  TBili  0.8  /  DBili  x   /  AST  23  /  ALT  14  /  AlkPhos  156<H>  12-06    PT/INR - ( 05 Dec 2022 05:14 )   PT: 16.9 sec;   INR: 1.45 ratio           CAPILLARY BLOOD GLUCOSE                RADIOLOGY & ADDITIONAL TESTS:    Imaging Personally Reviewed:  [x] YES  [ ] NO    Will obtain old records:  [ ] YES  [x] NO

## 2022-12-17 NOTE — H&P ADULT - NSHPLABSRESULTS_GEN_ALL_CORE
LABS:                        9.7    12.32 )-----------( 145      ( 17 Dec 2022 04:59 )             32.4         134<L>  |  99  |  36<H>  ----------------------------<  165<H>  4.5   |  28  |  1.32<H>    Ca    10.6<H>      17 Dec 2022 04:59    TPro  6.8  /  Alb  3.3  /  TBili  2.2<H>  /  DBili  x   /  AST  28  /  ALT  21  /  AlkPhos  183<H>            Urinalysis Basic - ( 17 Dec 2022 05:41 )    Color: Yellow / Appearance: very cloudy / S.015 / pH: x  Gluc: x / Ketone: Negative  / Bili: Negative / Urobili: 4 mg/dL   Blood: x / Protein: 100 mg/dL / Nitrite: Negative   Leuk Esterase: Moderate / RBC: 0-2 /HPF / WBC 26-50   Sq Epi: x / Non Sq Epi: Few / Bacteria: Many      Lactate, Blood: 1.5 mmol/L ( @ 04:59)

## 2022-12-17 NOTE — ED ADULT NURSE NOTE - OBJECTIVE STATEMENT
Pt AAOx4. 87 year old male presents to ED with weakness, pt is a poor historian but states he felt weak today and could not walk. Per EMS, patient was 80% on room air when they arrived to his house where he was complaining of difficulty breathing. Respirations equal and unlabored, SpO2 96% on NC 2L at this time.  No acute distress noted at this time. Pt resting comfortably in bed.   PMH: chronic anemia, CAD, HTN, HLD, COPD, Afib (takes Eliquis), BPH, AAA (had saccular dilation with stent placement August 2022), CHF EF 35% (torsemide prn), colonic polyps

## 2022-12-17 NOTE — ED ADULT TRIAGE NOTE - CHIEF COMPLAINT QUOTE
hx CHF, AAA, anima and COPD pt from home sent in for medical eval  , pt unable to adulate x today,  pt 80 % on room air EMS noted difficulty breathing placed 6L NC with improvement. dx with COVID 12/08 C/O epigastric pain and bilat shoulder. denies chest pain and N/V

## 2022-12-17 NOTE — H&P ADULT - NSHPPHYSICALEXAM_GEN_ALL_CORE
PHYSICAL EXAMINATION:  Vital Signs Last 24 Hrs  T(C): 36.7 (17 Dec 2022 12:01), Max: 39.3 (17 Dec 2022 01:54)  T(F): 98 (17 Dec 2022 12:01), Max: 102.7 (17 Dec 2022 01:54)  HR: 80 (17 Dec 2022 12:01) (78 - 88)  BP: 103/65 (17 Dec 2022 12:01) (89/59 - 127/78)  BP(mean): --  RR: 20 (17 Dec 2022 12:01) (20 - 35)  SpO2: 97% (17 Dec 2022 12:01) (95% - 100%)    Parameters below as of 17 Dec 2022 12:01  Patient On (Oxygen Delivery Method): nasal cannula  O2 Flow (L/min): 4    CAPILLARY BLOOD GLUCOSE            GENERAL: NAD, well-groomed,  HEAD:  atraumatic, normocephalic  EYES: sclera anicteric  ENMT: mucous membranes moist  NECK: supple, No JVD  CHEST/LUNG:   decreased  b/sounds, bases  HEART: normal S1, S2  ABDOMEN: BS+, soft, ND, NT   EXTREMITIES:       edema    b/l LEs  NEURO: awake,     moves all extremities  SKIN: no     rash  able to raise  b/l legs

## 2022-12-17 NOTE — CONSULT NOTE ADULT - SUBJECTIVE AND OBJECTIVE BOX
Vascular Surgery Consult      HPI:  88 y/o M h/o  chronic anemia, CAD, HTN, HLD, Afib , not on eliquis,/ PPM/  AICD,  BPH, AAA (had saccular dilation w/ stent placement 2022), c/c  CHF EF 35% on torsemide prn, colonic polyps  presenting to the ED with weakness. Patient is a poor historian, states he felt weak today and could not walk. Per EMS, patient O2Sat was 80% on RA when they arrived at his house and was c/o difficulty breathing.  recently admitted to OSH for anemia.      Pt seen and examined at bedside with Dr. Chirinos.  Pt COVID 19 positive s/p remdesivir x 3d course completed   Since that time, he has remained very weak.   Unable to get OOB yesterday and presented to the ER today.    BNP 8700  creat 1.0 -> 1.3    Review of Systems: REVIEW OF SYSTEMS:  GEN: no fever,    no chills  RESP:  SOB,   no cough  CVS: no chest pain,   no palpitations  GI: no abdominal pain,   no nausea,   no vomiting,   no constipation,   no diarrhea  : no dysuria,   no frequency  NEURO: no headache,   no dizziness  PSYCH: no depression,   not anxious  Derm : no rash  PAST MEDICAL HISTORY:  SSS (sick sinus syndrome)    BPH (benign prostatic hyperplasia)    HTN (hypertension)    CHF (congestive heart failure)    GERD (gastroesophageal reflux disease)    Peripheral edema    CAD (coronary artery disease)    HLD (hyperlipidemia)    A-fib    PITO (obstructive sleep apnea)    IBS (irritable bowel syndrome)        PAST SURGICAL HISTORY:  Hernia    AICD (automatic cardioverter/defibrillator) present  H/O angioplasty  S/P TURP (status post transurethral resection of prostate)    Social hx: Denies alcohol, smoking or any other recreational drug use.    MEDICATIONS:  aspirin  chewable 81 milliGRAM(s) Oral daily  atorvastatin 20 milliGRAM(s) Oral at bedtime  cefTRIAXone   IVPB 1000 milliGRAM(s) IV Intermittent every 24 hours  furosemide   Injectable 20 milliGRAM(s) IV Push two times a day  heparin   Injectable 5000 Unit(s) SubCutaneous every 12 hours    ALLERGIES:  codeine (Nausea)  hazelnuts, facial swelling (Other)      VITALS & I/Os:  Vital Signs Last 24 Hrs  T(C): 36.7 (17 Dec 2022 12:01), Max: 39.3 (17 Dec 2022 01:54)  T(F): 98 (17 Dec 2022 12:01), Max: 102.7 (17 Dec 2022 01:54)  HR: 77 (17 Dec 2022 16:31) (77 - 88)  BP: 99/58 (17 Dec 2022 16:31) (89/59 - 127/78)  BP(mean): --  RR: 25 (17 Dec 2022 16:31) (20 - 35)  SpO2: 97% (17 Dec 2022 16:31) (95% - 100%)    Parameters below as of 17 Dec 2022 16:31  Patient On (Oxygen Delivery Method): nasal cannula  O2 Flow (L/min): 3      I&O's Summary    PHYSICAL EXAM:  GENERAL: NAD, well-groomed,  HEAD:  atraumatic, normocephalic  EYES: sclera anicteric  ENMT: mucous membranes moist  NECK: supple, No JVD  CHEST/LUNG:   decreased  b/sounds, bases  HEART: normal S1, S2  ABDOMEN: BS+, soft, ND, NT   EXTREMITIES:       edema    b/l LEs  NEURO: awake,     moves all extremities  SKIN: no     rash  able to raise  b/l legs    LABS:                        9.7    12.32 )-----------( 145      ( 17 Dec 2022 04:59 )             32.4         134<L>  |  99  |  36<H>  ----------------------------<  165<H>  4.5   |  28  |  1.32<H>    Ca    10.6<H>      17 Dec 2022 04:59    TPro  6.8  /  Alb  3.3  /  TBili  2.2<H>  /  DBili  x   /  AST  28  /  ALT  21  /  AlkPhos  183<H>      Lactate: Lactate, Blood: 1.5 mmol/L ( @ 04:59)       Urinalysis Basic - ( 17 Dec 2022 05:41 )    Color: Yellow / Appearance: very cloudy / S.015 / pH: x  Gluc: x / Ketone: Negative  / Bili: Negative / Urobili: 4 mg/dL   Blood: x / Protein: 100 mg/dL / Nitrite: Negative   Leuk Esterase: Moderate / RBC: 0-2 /HPF / WBC 26-50   Sq Epi: x / Non Sq Epi: Few / Bacteria: Many        IMAGING:  < from: CT Angio Abdomen and Pelvis w/ IV Cont (22 @ 15:49) >    ACC: [id number] EXAM:  CT ANGIO ABD PELV (W)AW IC                          *** ADDENDUM***    The more inferior aneurysm is the 3.7 cm aneurysm. The 6.5 cm aneurysm   extends for a length of 5.9 cm craniocaudad and the more inferior   aneurysm extends for a length of 2.5 cm craniocaudad. There is an   approximately 0.6 cm distance between the 2 aneurysms.    --- End of Report ---    *** END OF ADDENDUM***      PROCEDURE DATE:  2022          INTERPRETATION:  FINAL REPORT:    CLINICAL INFORMATION: History of abdominal aortic aneurysm repair.   Evaluate for endoleak.    COMPARISON: None.    CONTRAST/COMPLICATIONS:  IV Contrast: Omnipaque 350  95 cc administered   5 cc discarded  Oral Contrast: NONE  Complications: None reported at time of study completion    PROCEDURE:  CT Angiography of the Abdomen and Pelvis.  Precontrast, Arterial and Delayed phases were acquired.  Sagittal and coronal reformats were performed as well as 3D (MIP)   reconstructions.    FINDINGS:  LOWER CHEST: Small loculated right pleural effusion. Marked cardiomegaly.   Partially imaged cardiac device wires.    LIVER: 1 cm low-attenuation lesion in the posterior segment of the right   hepatic lobe, possibly a cyst.  BILE DUCTS: Normal caliber.  GALLBLADDER: Cholelithiasis.  SPLEEN: Within normal limits.  PANCREAS: Within normal limits.  ADRENALS: Within normal limits.  KIDNEYS/URETERS: No hydronephrosis. Left renal cyst.    BLADDER: Within normal limits.  REPRODUCTIVE ORGANS: Enlarged prostate.    BOWEL: No bowel obstruction. Moderate amount retained fecal material in   the colon, greatest in the right hemicolon. No evidence of appendicitis.  PERITONEUM: No ascites.  VESSELS: Abdominal aortic endograft abdominal aortic aneurysm measuring   6.5 cm transverse with a second aneurysm more inferiorly measuring 3.7 cm   transverse. Endoleak into the more inferior aneurysm; there appears to be   a discontinuity in the graft at the level of the leak (series 605 image   39) suspicious for a type III endoleak.Iliac and superior mesenteric   arteries are patent. Renal arteries are patent.  RETROPERITONEUM/LYMPH NODES: No lymphadenopathy.  ABDOMINAL WALL: Small amount of fluid in the left inguinal canal.  BONES: No aggressive osseous lesion. Degenerative changes in the spine.    IMPRESSION:  Abdominal aortic endograft with 2 adjacent abdominal aortic aneurysms   measuring 6.5 cm and 3.7 cm with an endoleak into the more inferior   aneurysm; there appears to be a discontinuity in the graft at the level   of the leak suspicious for a type III endoleak.    Moderate cardiomegaly.    Small partially loculated right pleural effusion.    The above findings of endoleak represent a change from the preliminary   interpretation of no endoleak and were discussed with Dr. FELIPE Fontanez by   Dr. Molina with read back confirmation at 4:15 PM on 2022.    < end of copied text >

## 2022-12-17 NOTE — H&P ADULT - ASSESSMENT
87 year old male      h/o  HTN, HLD, CAD, AF on Eliquis,      AAA s/p stent placement August 2022,      CHF EF 35% s/p AICD, colon polyps, and chronic anemia     s/p  EGD and colonoscopy ,  [facility] ,  Dr. 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  and 25mm laterally spreading transverse colon polyp "possibly connected to additional 15mm sessile polyp", the last of which he was set up for outpatient follow up with Dr. Brenner for EMR.    also had b marrow bx by heme ,  was  COVID positive, on  prior visit  this month       *  now  admitted  with generalized  weakness/ has  some sob at  home,. resolved with O2     sob/ hypoxia,  from acute  on c/c  systolic  chf  +  Troponin  and  elevated  BNP . ekg, paced   cxr  report, pending. /  has  vascular  congestion/ infiltrates   CT  chest  ordered   given low  sbp ,  will hold  aldactone, cozaar  and  coreg  for now   on iv  lasix bid   monitor  wts/  card  dr osborn   *   h/o  chronic   afib, on eliquis   *   Cardiomyopathy   has  AICD/ [employer],   f/p  at  [facility]   *   elevated  wbc. abnormal u/a    on iv  rocephin/  follow  ucx    *  mild  CASSANDRA   #   h/o anemia, baseilne  about  7  to 8   c/scopy,  25mm laterally spreading transverse colon polyp "possibly connected to additional 15mm sessile polyp" on colonoscopy 8/22/2022, and  egd ,Mendez's    *  h/o AAA, times  2 on recent  ct  scan,. 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 The 6.5 cm aneurysm   extends for a length of 5.9 cm craniocaudad and the more inferior   aneurysm extends for a length of 2.5 cm craniocaudad. There is an   approximately 0.6 cm distance between the 2 aneurysms.  --- End of Report ---  *** END OF ADDENDUM***  PROCEDURE DATE:  12/03/2022      IMPRESSION:  Abdominal aortic endograft with 2 adjacent abdominal aortic aneurysms   measuring 6.5 cm and 3.7 cm with an endoleak into the more inferior   aneurysm; there appears to be a discontinuity in the graft at the level   of the leak suspicious for a type III endoleak.  Moderate cardiomegaly.  Small partially loculated right pleural effusion.  The above findings of endoleak represent a change from the preliminary   interpretation of no endoleak and were discussed with Dr. FELIPE Fontanez by   Dr. Alston with read back confirmation at 4:15 PM on 12/4/2022.  --- End of Report ---  ***Please see the addendum at the top of this report. It may contain   additional important information or changes.****        SINGH ALSTON MD; Attending Radiologist  This document has been electronically signed. Dec  4 2022  4:27PM  Addend:SINGH ALSTON MD; Attending Radiologist  This addendum was electronically signed on: Dec  4 2022  4:34    < end of copied text >       87 year old male      h/o  HTN, HLD, CAD, AF  was on Eliquis, not  seen on recent visit      AAA s/p stent placement August 2022,      CHF EF 35% s/p AICD, colon polyps, and chronic anemia     s/p  EGD and colonoscopy ,  [facility] ,  Dr. 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  and 25mm laterally spreading transverse colon polyp "possibly connected to additional 15mm sessile polyp", the last of which he was set up for outpatient follow up with Dr. Brenner for EMR.    also had b marrow bx by heme ,  was  COVID positive, on  prior visit  this month       *  now  admitted  with generalized  weakness/ has  some sob at  home,. resolved with O2     sob/ hypoxia,  from acute  on c/c  systolic  chf  +  Troponin  and  elevated  BNP . ekg, paced   cxr  report, pending. /  has  vascular  congestion/ infiltrates   CT  chest  ordered   given low  sbp ,  will hold  aldactone, cozaar  and  coreg  for now   on iv  lasix   qd    monitor  wts/  card  dr osborn   *   h/o  chronic   afib,  not  on eliquis    *   Cardiomyopathy   has  AICD/ [employer],   f/p  at  [facility]   *   elevated  wbc. abnormal u/a    on iv  rocephin/  follow  ucx    *  mild  CASSANDRA   #   h/o anemia, baseilne  about  7  to 8   c/scopy,  25mm laterally spreading transverse colon polyp "possibly connected to additional 15mm sessile polyp" on colonoscopy 8/22/2022, and  egd ,Mendez's    *  h/o AAA, times  2 on recent  ct  scan,. 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 The 6.5 cm aneurysm   extends for a length of 5.9 cm craniocaudad and the more inferior   aneurysm extends for a length of 2.5 cm craniocaudad. There is an   approximately 0.6 cm distance between the 2 aneurysms.  --- End of Report ---  *** END OF ADDENDUM***  PROCEDURE DATE:  12/03/2022      IMPRESSION:  Abdominal aortic endograft with 2 adjacent abdominal aortic aneurysms   measuring 6.5 cm and 3.7 cm with an endoleak into the more inferior   aneurysm; there appears to be a discontinuity in the graft at the level   of the leak suspicious for a type III endoleak.  Moderate cardiomegaly.  Small partially loculated right pleural effusion.  The above findings of endoleak represent a change from the preliminary   interpretation of no endoleak and were discussed with Dr. FELIPE Fontanez by   Dr. Alston with read back confirmation at 4:15 PM on 12/4/2022.  --- End of Report ---  ***Please see the addendum at the top of this report. It may contain   additional important information or changes.****        SINGH ALSTON MD; Attending Radiologist  This document has been electronically signed. Dec  4 2022  4:27PM  Addend:SINGH ALSTON MD; Attending Radiologist  This addendum was electronically signed on: Dec  4 2022  4:34    < end of copied text >       87 year old male      h/o  HTN, HLD, CAD, AF  was on Eliquis, not  seen on recent visit      AAA s/p stent placement August 2022,      CHF EF 35% s/p AICD, colon polyps, and chronic anemia     s/p  EGD and colonoscopy ,  [facility] ,  Dr. 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  and 25mm laterally spreading transverse colon polyp "possibly connected to additional 15mm sessile polyp", the last of which he was set up for outpatient follow up with Dr. Brenner for EMR.    also had b marrow bx by heme ,  was  COVID positive, on  prior visit  this month       *  now  admitted  with generalized  weakness/ has  some sob at  home,. resolved with O2     sob/ hypoxia,  from acute  on c/c  systolic  chf  +  Troponin  and  elevated  BNP . ekg, paced   cxr  report, pending. /  has  vascular  congestion/ infiltrates   CT  chest  ordered   given low  sbp ,  will hold  aldactone, cozaar  and  coreg  for now   on iv  lasix     monitor  wts/  card  dr osborn   *   h/o  chronic   afib,  not  on eliquis ,  confirmed  by  daughter    *   Cardiomyopathy   has  AICD/ [employer],   f/p  at  [facility] dr sanchez   *   elevated  wbc. abnormal u/a    on iv  rocephin/  follow  ucx    *  mild  CASSANDRA   #   h/o anemia, baseilne  about  7  to 8   c/scopy,  25mm laterally spreading transverse colon polyp "possibly connected to additional 15mm sessile polyp" on colonoscopy 8/22/2022, and  egd ,Mendez's    *  h/o AAA, times  2 on recent  ct  scan,. 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 The 6.5 cm aneurysm   extends for a length of 5.9 cm craniocaudad and the more inferior   aneurysm extends for a length of 2.5 cm craniocaudad. There is an   approximately 0.6 cm distance between the 2 aneurysms.  --- End of Report ---  *** END OF ADDENDUM***  PROCEDURE DATE:  12/03/2022      IMPRESSION:  Abdominal aortic endograft with 2 adjacent abdominal aortic aneurysms   measuring 6.5 cm and 3.7 cm with an endoleak into the more inferior   aneurysm; there appears to be a discontinuity in the graft at the level   of the leak suspicious for a type III endoleak.  Moderate cardiomegaly.  Small partially loculated right pleural effusion.  The above findings of endoleak represent a change from the preliminary   interpretation of no endoleak and were discussed with Dr. FELIPE Fontanez by   Dr. Alston with read back confirmation at 4:15 PM on 12/4/2022.  --- End of Report ---  ***Please see the addendum at the top of this report. It may contain   additional important information or changes.****        SINGH ALSTON MD; Attending Radiologist  This document has been electronically signed. Dec  4 2022  4:27PM  Addend:SINGH ALSTON MD; Attending Radiologist  This addendum was electronically signed on: Dec  4 2022  4:34    < end of copied text >

## 2022-12-17 NOTE — ED ADULT TRIAGE NOTE - ESI TRIAGE ACUITY LEVEL, MLM
Follow-up with anemia management service:    LVM for Ky to check in re Anemia Services.  Has he been dosing?    Anemia Latest Ref Rng & Units 12/27/2019 12/31/2019 1/16/2020 1/30/2020 2/13/2020 2/27/2020 3/12/2020   HGB Goal - - - - - - - -   GUIDO Dose - - 60 mcg 60 mcg 60 mcg 60 mcg 60 mcg 60 mcg   Hemoglobin 13.3 - 17.7 g/dL - 8.5(L) 8.7(L) 9.1(L) 9.1(L) 9.4(L) 8.9(L)   IV Iron Dose - 750mg - - - - - -   TSAT 15 - 46 % - - 20 25 - 26 -   Ferritin 26 - 388 ng/mL - - 445(H) 445(H) - 412(H) -     Spoke with Ky.  He is feeling ok right now and will hold off on the Aranesp injection.  Will follow up again in 2 weeks.     Follow-up call date: 4/14/2020    Stacey Garcia RN   Anemia Services  [facility]  [address] [place] [id number]   [email or web address]   Office : [phone]  Fax: [phone]         2 3

## 2022-12-17 NOTE — ED PROVIDER NOTE - OBJECTIVE STATEMENT
88 y/o M with hx of chronic anemia, CAD, HTN, HLD, afib on eliquis, BPH, AAA (had saccular dilation w/ stent placement August 2022), CHF EF 35% on torsemide prn, colonic polyps presenting to the ED with weakness. Patient is a poor historian, states he felt weak today and could not walk. Per EMS, patient was 80% on RA when they arrived at his house and was c/o difficulty breathing. Was recently admitted to OSH for anemia.

## 2022-12-17 NOTE — CONSULT NOTE ADULT - SUBJECTIVE AND OBJECTIVE BOX
CARDIOLOGY CONSULT NOTE    Patient is a 87y Male with a known history of :    HPI:  86yo man with a PMH of chronic anemia, CAD, HTN, HLD, afib on eliquis, BPH, AAA (had saccular dilation w/ stent placement August 2022), CHF EF 35% on torsemide prn and s/p AICD, colonic polyps w/ 2.5 cm remaining in transverse colon (to be evaled w/ Dr. Brenner outpt), recent endoscopy w/ Mendez's esophagus w/ anemia - baseline Hg ~8, borderline SBP ~90-100s as per pt and wife.    Found to be COVID 19 positive s/p remdesivir x 3d course completed 12/6  Since that time, he has remained very weak.   Unable to get OOB yesterday and presented to the ER today.  Incidentally found to have a +trop 90  BNP 8700  creat 1.0 -> 1.3  EKG paced 80bpm      REVIEW OF SYSTEMS:    CONSTITUTIONAL:  + fatigue  EYES: No eye pain, visual disturbances, or discharge  ENMT:  No difficulty hearing, tinnitus, vertigo; No sinus or throat pain  NECK: No pain or stiffness  BREASTS: No pain, masses, or nipple discharge  RESPIRATORY: No cough, wheezing, chills or hemoptysis; No shortness of breath  CARDIOVASCULAR: No chest pain, palpitations, dizziness, or leg swelling  GASTROINTESTINAL: No abdominal or epigastric pain. No nausea, vomiting, or hematemesis; No diarrhea or constipation. No melena or hematochezia.  GENITOURINARY: No dysuria, frequency, hematuria, or incontinence  NEUROLOGICAL: No headaches, memory loss, loss of strength, numbness, or tremors  SKIN: No itching, burning, rashes, or lesions   LYMPH NODES: No enlarged glands  ENDOCRINE: No heat or cold intolerance; No hair loss  MUSCULOSKELETAL: No joint pain or swelling; No muscle, back, or extremity pain  PSYCHIATRIC: No depression, anxiety, mood swings, or difficulty sleeping  HEME/LYMPH: No easy bruising, or bleeding gums  ALLERGY AND IMMUNOLOGIC: No hives or eczema    MEDICATIONS  (STANDING):  aspirin  chewable 81 milliGRAM(s) Oral daily  atorvastatin 20 milliGRAM(s) Oral at bedtime  cefTRIAXone   IVPB 1000 milliGRAM(s) IV Intermittent every 24 hours  furosemide   Injectable 20 milliGRAM(s) IV Push two times a day  heparin   Injectable 5000 Unit(s) SubCutaneous every 12 hours    MEDICATIONS  (PRN):    Home Medications:  alfuzosin 10 mg oral tablet, extended release: 1 tab(s) orally once a day (03 Dec 2022 09:26)  Coreg 3.125 mg oral tablet: 1 tab(s) orally 2 times a day (03 Dec 2022 09:24)  fluticasone nasal 27.5 mcg/inh nasal spray: 1 spray(s) nasal once a day, As Needed (16 Jun 2015 10:30)  fluticasone propionate: 50 micrograms as needed (03 Dec 2022 09:26)  glycopyrrolate 1 mg oral tablet: 1 tab(s) orally 2 times a day (03 Dec 2022 09:25)  losartan 25 mg oral tablet: 1 tab(s) orally once a day (at bedtime) (16 Jun 2015 10:30)  multivitamin: daily (03 Dec 2022 09:27)  Percocet 5/325 oral tablet: 1 tab(s) orally every 4 hours, As Needed for pain (18 Jun 2015 18:33)  ranitidine 300 mg oral capsule: 1 cap(s) orally once a day (at bedtime) (16 Jun 2015 10:30)  Singulair 10 mg oral tablet: 1 tab(s) orally once a day (03 Dec 2022 09:26)  spironolactone: 12.5 milligram(s) orally once a day (03 Dec 2022 09:24)  super b complex 1 po daily:    (16 Jun 2015 10:30)  torsemide 20 mg oral tablet: orally once a week (04 Dec 2022 14:43)  Trelegy Ellipta 200 mcg-62.5 mcg-25 mcg/inh inhalation powder: 1 puff(s) inhaled once a day (03 Dec 2022 09:24)  Vitamin C 500 mg oral capsule: 1 cap(s) orally once a day (03 Dec 2022 09:26)  vitamin c 500 mg po daily:    (16 Jun 2015 10:30)  Vitamin D3 2000 intl units oral capsule: 1 cap(s) orally once a day (16 Jun 2015 10:30)      ALLERGIES: codeine (Nausea)  hazelnuts, facial swelling (Other)      FAMILY HISTORY:      PHYSICAL EXAMINATION:  -----------------------------  T(C): 36.7 (12-17-22 @ 12:01), Max: 39.3 (12-17-22 @ 01:54)  HR: 80 (12-17-22 @ 12:01) (78 - 88)  BP: 103/65 (12-17-22 @ 12:01) (89/59 - 127/78)  RR: 20 (12-17-22 @ 12:01) (20 - 35)  SpO2: 97% (12-17-22 @ 12:01) (95% - 100%)      Constitutional:  no acute distress.   Eyes: the conjunctiva exhibited no abnormalities and the eyelids demonstrated no xanthelasmas.   HEENT: normal oral mucosa, no oral pallor and no oral cyanosis.   Neck: normal jugular venous A waves present, normal jugular venous V waves present and no jugular venous ríos A waves.   Pulmonary: no respiratory distress, normal respiratory rhythm and effort, no accessory muscle use and lungs were clear to auscultation bilaterally.   Cardiovascular: heart rate and rhythm were normal, normal S1 and S2 and no murmur, gallop, rub, heave or thrill are present.   Abdomen: soft, non-tender, no hepato-splenomegaly and no abdominal mass palpated.   Musculoskeletal: the gait could not be assessed..   Extremities: no clubbing of the fingernails, no localized cyanosis, no petechial hemorrhages and no ischemic changes.   Skin: normal skin color and pigmentation, no rash, no venous stasis, no skin lesions, no skin ulcer and no xanthoma was observed.   Psychiatric: oriented to person, place, and time, the affect was normal, the mood was normal and not feeling anxious.         LABS:   --------  12-17    134<L>  |  99  |  36<H>  ----------------------------<  165<H>  4.5   |  28  |  1.32<H>    Ca    10.6<H>      17 Dec 2022 04:59    TPro  6.8  /  Alb  3.3  /  TBili  2.2<H>  /  DBili  x   /  AST  28  /  ALT  21  /  AlkPhos  183<H>  12-17                         9.7    12.32 )-----------( 145      ( 17 Dec 2022 04:59 )             32.4       12-17 @ 04:59 BNP: 8787 pg/mL

## 2022-12-17 NOTE — PATIENT PROFILE ADULT - FALL HARM RISK - HARM RISK INTERVENTIONS

## 2022-12-17 NOTE — PATIENT PROFILE ADULT - PUBLIC BENEFITS
Detail Level: Simple Additional Notes: Continue higher dose for another month no Render Risk Assessment In Note?: yes Patient Management Risk Assessment: Moderate Additional Notes: Stable

## 2022-12-17 NOTE — ED ADULT NURSE NOTE - ED STAT RN HANDOFF DETAILS
Patient admitted, will be transferred to [address] [place] [address] area, report given to Lakisha PLASCENCIA.

## 2022-12-17 NOTE — ED PROVIDER NOTE - PROGRESS NOTE DETAILS
Urine positive for infection. CXR reviewed, appears chronically fluid overloaded. Will dose ceftriaxone for UTI and azithromycin for possible pna. Reassess.

## 2022-12-17 NOTE — CONSULT NOTE ADULT - ASSESSMENT
86yo man with a PMH of chronic anemia, CAD, HTN, HLD, afib on eliquis, BPH, AAA (had saccular dilation w/ stent placement August 2022), CHF EF 35% on torsemide prn and s/p AICD, colonic polyps w/ 2.5 cm remaining in transverse colon (to be evaled w/ Dr. Brenner outpt), recent endoscopy w/ Mendez's esophagus w/ anemia - baseline Hg ~8, borderline SBP ~90-100s as per pt and wife.    Found to be COVID 19 positive s/p remdesivir x 3d course completed 12/6  Since that time, he has remained very weak.   Unable to get OOB yesterday and presented to the ER today.  Incidentally found to have a +trop 90  BNP 8700  creat 1.0 -> 1.3  EKG paced 80bpm    Discussed with pt... NOT complaining of any cardiac sx.  Only c/o weakness and fatigue.  Overall picture not consistent with CAD or CHF.   Likely deconditioning from recent COVID superimposed on infection/UTI.  Creat elevated... would switch to PO torsemide prn - home regiment.  Exam with clear lungs.  Cont asa/statin  Would hold spironolactone and losartan.  Can f/u with outpt cardiologist upon d/c to eval to restart. Dr. Ackerman at [facility].  Would restart home coreg 3.125 BID when SBPs>100... currently 90s-100s  On abx for UTI  PT/SW eval  Will sign off for now; please call back with any further questions.

## 2022-12-17 NOTE — CONSULT NOTE ADULT - ASSESSMENT
88yo man with a PMH of chronic anemia, CAD, HTN, HLD, afib on eliquis, BPH, AAA (had saccular dilation w/ stent placement August 2022), CHF EF 35% on torsemide prn and s/p AICD, colonic polyps w/ 2.5 cm remaining in transverse colon (to be evaled w/ Dr. Brenner outpt), recent endoscopy w/ Mendez's esophagus w/ anemia - baseline Hg ~8. Pt with Ct scan findings from 12/3 showing inferior aneurysm is the 3.7 cm aneurysm. The 6.5 cm aneurysm.extends for a length of 5.9 cm craniocaudad and the more inferior aneurysm extends for a length of 2.5 cm craniocaudad. There is an approximately 0.6 cm distance between the 2 aneurysms.   Pt currently being followed by vascular surgeon and is in treatment. No acute vascular surgical intervention at this time, pt should followup with her vascular surgeon on discharge to continue care.   Cont medical management for acute issues  discuss with Dr. Chirinos

## 2022-12-17 NOTE — ED PROVIDER NOTE - NSTIMEPROVIDERCAREINITIATE_GEN_ER
17-Dec-2022 05:00 Notification Instructions: Patient will be notified of biopsy results. However, patient instructed to call the office if not contacted within 2 weeks.

## 2022-12-17 NOTE — ED PROVIDER NOTE - PHYSICAL EXAMINATION
GENERAL: Awake, alert, NAD  HEENT: NC/AT, moist mucous membranes  LUNGS: crackles bilateral lung fields    CARDIAC: RRR, no m/r/g  ABDOMEN: Soft, normal BS, non tender, non distended, no rebound, no guarding  EXT: No edema, no calf tenderness, 2+ DP pulses bilaterally, no deformities.  NEURO: A&Ox3. Moving all extremities.  SKIN: Warm and dry. No rash.  PSYCH: Normal affect. GENERAL: Awake, alert, NAD  HEENT: NC/AT, moist mucous membranes  LUNGS: crackles bilateral lung fields    CARDIAC: RRR, no m/r/g  ABDOMEN: Soft, normal BS, non tender, non distended, no rebound, no guarding  EXT: No edema, no calf tenderness, 2+ DP pulses bilaterally, no deformities.  NEURO: Responds to simple questions. Follows commands. Moving all extremities.  SKIN: Warm and dry. No rash.  PSYCH: Normal affect.

## 2022-12-17 NOTE — H&P ADULT - HISTORY OF PRESENT ILLNESS
86 y/o M     h/o  chronic anemia, CAD, HTN, HLD,    Afib on eliquis,/ PPM/  AICD,  BPH, AAA (had saccular dilation w/ stent placement August 2022),     c/c  CHF EF 35% on torsemide prn, colonic polyps      presenting to the ED with weakness.     Patient is a poor historian, states he felt weak today and could not walk    Per EMS, patient was 80% on RA when they arrived at his house and was c/o difficulty breathing.   recently admitted to OSH for anemia. 86 y/o M     h/o  chronic anemia, CAD, HTN, HLD,    Afib , not on eliquis,/ PPM/  AICD,  BPH, AAA (had saccular dilation w/ stent placement August 2022),     c/c  CHF EF 35% on torsemide prn, colonic polyps      presenting to the ED with weakness.     Patient is a poor historian, states he felt weak today and could not walk    Per EMS, patient was 80% on RA when they arrived at his house and was c/o difficulty breathing.   recently admitted to OSH for anemia.

## 2022-12-17 NOTE — ED PROVIDER NOTE - CLINICAL SUMMARY MEDICAL DECISION MAKING FREE TEXT BOX
88 y/o M presenting to the ED c/o weakness. Patient is febrile upon arrival. Requiring 4L O2. Plan for sepsis workup with labs, cxr, ekg. Will require admission. Will defer fluids given normotensive with significant CHF.

## 2022-12-18 NOTE — PHARMACOTHERAPY INTERVENTION NOTE - COMMENTS
As per policy, adjusted the ceftriaxone dose from 1g IV q24h to 2g IV q24h since the 12/17 blood culture grew Streptococcus species.    Arnulfo Gross, PharmD, [facility]  Clinical Pharmacy Specialist, Infectious Diseases  Tele-Antimicrobial Stewardship Program (Tele-ASP)  Tele-ASP Phone: [phone]

## 2022-12-18 NOTE — PROGRESS NOTE ADULT - SUBJECTIVE AND OBJECTIVE BOX
Patient is a 87y old  Male who presents with a chief complaint of weakness/sob (17 Dec 2022 16:20)      OVERNIGHT EVENTS:      REVIEW OF SYSTEMS: denies chest pain/SOB, diaphoresis, no F/C, cough, dizziness, headache, blurry vision, nausea, vomiting, abdominal pain. Rest unremarkable     MEDICATIONS  (STANDING):  aspirin  chewable 81 milliGRAM(s) Oral daily  atorvastatin 20 milliGRAM(s) Oral at bedtime  cefTRIAXone   IVPB 1000 milliGRAM(s) IV Intermittent every 24 hours  furosemide   Injectable 20 milliGRAM(s) IV Push two times a day  heparin   Injectable 5000 Unit(s) SubCutaneous every 12 hours    MEDICATIONS  (PRN):      Allergies    codeine (Nausea)  hazelnuts, facial swelling (Other)    Intolerances        SUBJECTIVE: in bed in NAD, no acute events overnight     T(F): 97.7 (22 @ 04:44), Max: 99.2 (22 @ 00:07)  HR: 81 (22 @ 04:44) (77 - 81)  BP: 99/59 (22 @ 04:44) (89/59 - 103/65)  RR: 22 (22 @ 04:44) (20 - 25)  SpO2: 92% (22 @ 04:44) (92% - 97%)  Wt(kg): --    PHYSICAL EXAM:  GENERAL: NAD, well-groomed, well-developed, elderly   HEAD:  Atraumatic, Normocephalic  EYES: EOMI, PERRLA, conjunctiva and sclera clear  ENMT: No tonsillar erythema, exudates, or enlargement; Moist mucous membranes, Good dentition, No lesions  NECK: Supple,   CHEST/LUNG: Clear to  auscultation bilaterally; No rales, rhonchi, wheezing, or rubs  bilaterally  HEART: Regular rate and rhythm; No murmurs, rubs, or gallops  ABDOMEN: Soft, Nontender, Nondistended; Bowel sounds present  EXTREMITIES:  2+ Peripheral Pulses, No clubbing, cyanosis, or edema BL LE  SKIN: No rashes or lesions  NERVOUS SYSTEM:  Alert & Oriented X3,     LABS:                        9.5    12.39 )-----------( 140      ( 18 Dec 2022 08:00 )             32.7         137  |  100  |  35<H>  ----------------------------<  95  3.8   |  35<H>  |  1.01    Ca    10.7<H>      18 Dec 2022 08:00    TPro  6.8  /  Alb  3.3  /  TBili  2.2<H>  /  DBili  x   /  AST  28  /  ALT  21  /  AlkPhos  183<H>  12-17      Urinalysis Basic - ( 17 Dec 2022 05:41 )    Color: Yellow / Appearance: very cloudy / S.015 / pH: x  Gluc: x / Ketone: Negative  / Bili: Negative / Urobili: 4 mg/dL   Blood: x / Protein: 100 mg/dL / Nitrite: Negative   Leuk Esterase: Moderate / RBC: 0-2 /HPF / WBC 26-50   Sq Epi: x / Non Sq Epi: Few / Bacteria: Many      Cultures;   CAPILLARY BLOOD GLUCOSE        Lipid panel:           RADIOLOGY & ADDITIONAL TESTS:      Imaging Personally Reviewed:  [ x] YES      Consultant(s) Notes Reviewed:  [x ] YES     Care Discussed with [x ] Consultants [X ] Patient [x ] Family  [x ]    [x ]  Other; RN Patient is a 87y old  Male who presents with a chief complaint of weakness/sob (17 Dec 2022 16:20)      OVERNIGHT EVENTS:    c/o constipation        MEDICATIONS  (STANDING):  aspirin  chewable 81 milliGRAM(s) Oral daily  atorvastatin 20 milliGRAM(s) Oral at bedtime  cefTRIAXone   IVPB 1000 milliGRAM(s) IV Intermittent every 24 hours  furosemide   Injectable 20 milliGRAM(s) IV Push two times a day  heparin   Injectable 5000 Unit(s) SubCutaneous every 12 hours    MEDICATIONS  (PRN):      Allergies    codeine (Nausea)  hazelnuts, facial swelling (Other)    Intolerances        SUBJECTIVE: in bed in NAD, no acute events overnight     T(F): 97.7 (22 @ 04:44), Max: 99.2 (22 @ 00:07)  HR: 81 (22 @ 04:44) (77 - 81)  BP: 99/59 (22 @ 04:44) (89/59 - 103/65)  RR: 22 (22 @ 04:44) (20 - 25)  SpO2: 92% (22 @ 04:44) (92% - 97%)  Wt(kg): --    PHYSICAL EXAM:  GENERAL: NAD, well-groomed, well-developed, elderly   HEAD:  Atraumatic, Normocephalic  EYES: EOMI, PERRLA, conjunctiva and sclera clear  ENMT: No tonsillar erythema, exudates, or enlargement; Moist mucous membranes, Good dentition, No lesions  NECK: Supple,   CHEST/LUNG: decreased bs at bases; No rales, rhonchi, wheezing, or rubs  bilaterally  HEART: Regular rate and rhythm; No murmurs, rubs, or gallops  ABDOMEN: Soft, Nontender, Nondistended; Bowel sounds present  EXTREMITIES:  2+ Peripheral Pulses, No clubbing, cyanosis, +edema BL LE  SKIN: No rashes or lesions  NERVOUS SYSTEM:  Alert & Oriented X3,     LABS:                        9.5    12.39 )-----------( 140      ( 18 Dec 2022 08:00 )             32.7     1218    137  |  100  |  35<H>  ----------------------------<  95  3.8   |  35<H>  |  1.01    Ca    10.7<H>      18 Dec 2022 08:00    TPro  6.8  /  Alb  3.3  /  TBili  2.2<H>  /  DBili  x   /  AST  28  /  ALT  21  /  AlkPhos  183<H>  12-17      Urinalysis Basic - ( 17 Dec 2022 05:41 )    Color: Yellow / Appearance: very cloudy / S.015 / pH: x  Gluc: x / Ketone: Negative  / Bili: Negative / Urobili: 4 mg/dL   Blood: x / Protein: 100 mg/dL / Nitrite: Negative   Leuk Esterase: Moderate / RBC: 0-2 /HPF / WBC 26-50   Sq Epi: x / Non Sq Epi: Few / Bacteria: Many      Cultures;   CAPILLARY BLOOD GLUCOSE        Lipid panel:           RADIOLOGY & ADDITIONAL TESTS:      Imaging Personally Reviewed:  [ x] YES      Consultant(s) Notes Reviewed:  [x ] YES     Care Discussed with [x ] Consultants [X ] Patient [x ] Family  [x ]    [x ]  Other; RN

## 2022-12-18 NOTE — PROGRESS NOTE ADULT - ASSESSMENT
87 year old male      h/o  HTN, HLD, CAD, AF  was on Eliquis, not  seen on recent visit      AAA s/p stent placement August 2022,      CHF EF 35% s/p AICD, colon polyps, and chronic anemia     s/p  EGD and colonoscopy ,  [facility] ,  Dr. 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  and 25mm laterally spreading transverse colon polyp "possibly connected to additional 15mm sessile polyp", the last of which he was set up for outpatient follow up with Dr. Brenner for EMR.    also had b marrow bx by heme ,  was  COVID positive, on  prior visit  this month       *  now  admitted  with generalized  weakness/ has  some sob at  home,. resolved with O2     sob/ hypoxia,  from acute  on c/c  systolic  chf  acute resp failure- with hypoxia  +  Troponin  and  elevated  BNP . ekg, paced   cxr  report, pending. /  has  vascular  congestion/ infiltrates   CT  chest  ordered  12/18/2022 f/u CT chest  continue with lasix   add midodrine for low bp and to support fluid removal   obtain ECHO no old in chart or HIE  holding  aldactone, cozaar  and  coreg  for now   monitor  wts    cardiology consulted by my collegue await   dr osborn     *   h/o  chronic   afib,  not  on eliquis  this was   confirmed  by  daughter per my colleague's documenattion     *   Cardiomyopathy   has  AICD/ [employer],   f/u  at  [facility] dr sanchez  12/18/2022 f/u echo  continue with lasix     *   elevated  wbc. abnormal u/a    on iv  rocephin/  follow  ucx   blood cx strp species not pneumo, group A or group B    * midl elevation calcium- check ionized , albumin , vit d and pth       *  mild  CASSANDRA   12/18/2022 creatinine is better on today     #   h/o anemia, baseline  about  7  to 8    sob/ hypoxia,  from acute  on c/c  systolic  Congestive Heart Failure Exacerbation:  Admit to [facility] under Dr. Lenz  Telemetry monitoring.  Lasix 80mg IVP Q12hrs.  Strict input/output monitoring,  Daily weight monitoring.  Fluid restriction to 1 liter/24 hrs.  O2 via NC@2l/min, keep sat>94% at all times.  3 Sets of cardiac enzymes and ekg q8hrs.  Aspirin 325mg po qdaily.  Plavix 75mg po qdaily.  ECHO in am.  Metoprolol 50mg po q12hrs  Enalapril 10mg po BID.  NTG 0.4mg SL q5mins x 3 doses PRN for chest pain.  Zocor 40mg po qhs.  Monitor Electrolytes Qdaily, Keep K+>4, Mg>2.  Cardiac Evaulation by  in am.  Elevate Head end of bed >35*, aspiration prec  GI Prophylaxis with Protonix 40 mg PO daily  DVT Prophylaxis with Heparin 5000units sc q8hrs.  Discussed with Cardiology fellow, agrees with plan.on polyp "possibly connected to additional 15mm sessile polyp" on colonoscopy 8/22/2022, and  egd ,Mendez's      *  h/o AAA, times  2 on recent  ct  scan,. with type  2  endoleak  vascular  dr rubalcava- noted and no vascular intervention indicated monitor hgb       87 year old male      h/o  HTN, HLD, CAD, AF  was on Eliquis, not  seen on recent visit      AAA s/p stent placement August 2022,      CHF EF 35% s/p AICD, colon polyps, and chronic anemia     s/p  EGD and colonoscopy ,  [facility] ,  Dr. 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  and 25mm laterally spreading transverse colon polyp "possibly connected to additional 15mm sessile polyp", the last of which he was set up for outpatient follow up with Dr. Brenner for EMR.    also had b marrow bx by heme ,  was  COVID positive, on  prior visit  this month       *  now  admitted  with generalized  weakness/ has  some sob at  home,. resolved with O2     sob/ hypoxia,  from acute  on c/c  systolic  chf  acute resp failure- with hypoxia  +  Troponin  and  elevated  BNP . ekg, paced   cxr  report, pending. /  has  vascular  congestion/ infiltrates   CT  chest  ordered  12/18/2022 f/u CT chest  continue with lasix   add midodrine for low bp and to support fluid removal   obtain ECHO no old in chart or HIE  holding  aldactone, cozaar  and  coreg  for now   monitor  wts    cardiology consulted by my collegue await   dr osborn     *   h/o  chronic   afib,  not  on eliquis  this was   confirmed  by  daughter per my colleague's documenattion     *   Cardiomyopathy   has  AICD/ [employer],   f/u  at  [facility] dr sanchez  12/18/2022 f/u echo  continue with lasix     *   elevated  wbc. abnormal u/a    on iv  rocephin/  follow  ucx   blood cx strp species not pneumo, group A or group B    * midl elevation calcium- check ionized , albumin , vit d and pth       *  mild  CASSANDRA   12/18/2022 creatinine is better on today     #   h/o anemia, baseline  about  7  to 8    plan.on polyp "possibly connected to additional 15mm sessile polyp" on colonoscopy 8/22/2022, and  egd ,Mendez's      *  h/o AAA, times  2 on recent  ct  scan,. with type  2  endoleak  vascular  dr rubalcava- noted and no vascular intervention indicated monitor hgb       c/o constipation starting lactulose bid for 4 doses, f/u for resposnse

## 2022-12-19 NOTE — PHYSICAL THERAPY INITIAL EVALUATION ADULT - GAIT DEVIATIONS NOTED, PT EVAL
decreased stella/increased time in double stance/decreased velocity of limb motion/decreased step length/decreased stride length

## 2022-12-19 NOTE — CONSULT NOTE ADULT - SUBJECTIVE AND OBJECTIVE BOX
CHIEF COMPLAINT:  Patient is a 87y old  Male who presents with a chief complaint of weakness/sob (18 Dec 2022 10:33)      HPI:  88 y/o M     h/o  chronic anemia, CAD, HTN, HLD,    Afib , not on eliquis,/ PPM/  AICD,  BPH, AAA (had saccular dilation w/ stent placement August 2022),     c/c  CHF EF 35% on torsemide prn, colonic polyps      presenting to the ED with weakness.     Patient is a poor historian, states he felt weak today and could not walk    Per EMS, patient was 80% on RA when they arrived at his house and was c/o difficulty breathing.   recently admitted to OSH for anemia. (17 Dec 2022 12:59)    ALLERGIES:  Allergies    codeine (Nausea)  hazelnuts, facial swelling (Other)    Intolerances      Home Medications:  alfuzosin 10 mg oral tablet, extended release: 1 tab(s) orally once a day (03 Dec 2022 09:26)  Coreg 3.125 mg oral tablet: 1 tab(s) orally 2 times a day (03 Dec 2022 09:24)  fluticasone nasal 27.5 mcg/inh nasal spray: 1 spray(s) nasal once a day, As Needed (16 Jun 2015 10:30)  fluticasone propionate: 50 micrograms as needed (03 Dec 2022 09:26)  glycopyrrolate 1 mg oral tablet: 1 tab(s) orally 2 times a day (03 Dec 2022 09:25)  losartan 25 mg oral tablet: 1 tab(s) orally once a day (at bedtime) (16 Jun 2015 10:30)  multivitamin: daily (03 Dec 2022 09:27)  Percocet 5/325 oral tablet: 1 tab(s) orally every 4 hours, As Needed for pain (18 Jun 2015 18:33)  ranitidine 300 mg oral capsule: 1 cap(s) orally once a day (at bedtime) (16 Jun 2015 10:30)  Singulair 10 mg oral tablet: 1 tab(s) orally once a day (03 Dec 2022 09:26)  spironolactone: 12.5 milligram(s) orally once a day (03 Dec 2022 09:24)  super b complex 1 po daily:    (16 Jun 2015 10:30)  torsemide 20 mg oral tablet: orally once a week (04 Dec 2022 14:43)  Trelegy Ellipta 200 mcg-62.5 mcg-25 mcg/inh inhalation powder: 1 puff(s) inhaled once a day (03 Dec 2022 09:24)  Vitamin C 500 mg oral capsule: 1 cap(s) orally once a day (03 Dec 2022 09:26)  vitamin c 500 mg po daily:    (16 Jun 2015 10:30)  Vitamin D3 2000 intl units oral capsule: 1 cap(s) orally once a day (16 Jun 2015 10:30)    PAST MEDICAL & SURGICAL HISTORY:  SSS (sick sinus syndrome)  AICD placed 11/06 generator change 10/09      BPH (benign prostatic hyperplasia)      HTN (hypertension)      CHF (congestive heart failure)      GERD (gastroesophageal reflux disease)      Peripheral edema      CAD (coronary artery disease)  MI in 1994 balloon angioplasty      HLD (hyperlipidemia)      A-fib  dx 1981      PITO (obstructive sleep apnea)  sleeps with O2 unable to tolerate cpap      IBS (irritable bowel syndrome)  with constipation      Hernia  bilateral IHR 1991      AICD (automatic cardioverter/defibrillator) present  placed 2006, replaced 2009      H/O angioplasty  1994      S/P TURP (status post transurethral resection of prostate)  x 2- Oct, Nov 2014            FAMILY HISTORY:      SOCIAL HISTORY:    REVIEW OF SYSTEMS:  General:  No wt loss, fevers, chills, night sweats  Eyes:  Good vision, no reported pain  ENT:  No sore throat, pain, runny nose, dysphagia  CV:  No pain, palpitations, hypo/hypertension  Resp:  No dyspnea, cough, tachypnea, wheezing  GI:  No pain, nausea, vomiting, diarrhea, constipation  :  No pain, bleeding, incontinence, nocturia  Muscle:  No pain, weakness  Breast:  No pain, abscess, mass, discharge  Neuro:  No weakness, tingling, memory problems  Psych:  No fatigue, insomnia, mood problems, depression  Endocrine:  No polyuria, polydipsia, cold/heat intolerance  Heme:  No petechiae, ecchymosis, easy bruisability  Skin:  No rash, tattoos, scars, edema    PHYSICAL EXAM:  Vital Signs:  Vital Signs Last 24 Hrs  T(C): 36.4 (19 Dec 2022 11:21), Max: 36.9 (19 Dec 2022 04:43)  T(F): 97.5 (19 Dec 2022 11:21), Max: 98.4 (19 Dec 2022 04:43)  HR: 81 (19 Dec 2022 11:21) (81 - 92)  BP: 119/81 (19 Dec 2022 11:21) (105/71 - 119/81)  BP(mean): --  RR: 17 (19 Dec 2022 11:21) (17 - 22)  SpO2: 100% (19 Dec 2022 11:21) (94% - 100%)    Parameters below as of 19 Dec 2022 11:21  Patient On (Oxygen Delivery Method): nasal cannula      I&O's Summary    19 Dec 2022 07:01  -  19 Dec 2022 14:22  --------------------------------------------------------  IN: 160 mL / OUT: 0 mL / NET: 160 mL      I&O's Detail    19 Dec 2022 07:01  -  19 Dec 2022 14:22  --------------------------------------------------------  IN:    Oral Fluid: 160 mL  Total IN: 160 mL    OUT:  Total OUT: 0 mL    Total NET: 160 mL          Tele:     Constitutional: well developed, normal appearance, well groomed, well nourished, no deformities and no acute distress.   Eyes: the conjunctiva exhibited no abnormalities and the eyelids demonstrated no xanthelasmas.   HEENT: normal oral mucosa, no oral pallor and no oral cyanosis.   Neck: normal jugular venous A waves present, normal jugular venous V waves present and no jugular venous ríos A waves.   Pulmonary: no respiratory distress, normal respiratory rhythm and effort, no accessory muscle use and lungs were clear to auscultation bilaterally.   Cardiovascular: heart rate and rhythm were normal, normal S1 and S2 and no murmur, gallop, rub, heave or thrill are present.   Abdomen: soft, non-tender, no hepato-splenomegaly and no abdominal mass palpated.   Musculoskeletal: the gait could not be assessed..   Extremities: no clubbing of the fingernails, no localized cyanosis, no petechial hemorrhages and no ischemic changes.   Skin: normal skin color and pigmentation, no rash, no venous stasis, no skin lesions, no skin ulcer and no xanthoma was observed.   Psychiatric: oriented to person, place, and time, the affect was normal, the mood was normal and not feeling anxious.      LABORATORY:                          9.6    10.50 )-----------( 154      ( 19 Dec 2022 07:20 )             33.2     12-19    136  |  99  |  32<H>  ----------------------------<  96  3.8   |  33<H>  |  0.84    Ca    11.4<H>      19 Dec 2022 07:20  Phos  2.5     12-19  Mg     2.4     12-19    TPro  6.5  /  Alb  2.9<L>  /  TBili  1.5<H>  /  DBili  0.8<H>  /  AST  22  /  ALT  19  /  AlkPhos  162<H>  12-19          CAPILLARY BLOOD GLUCOSE        LIVER FUNCTIONS - ( 19 Dec 2022 07:20 )  Alb: 2.9 g/dL / Pro: 6.5 gm/dL / ALK PHOS: 162 U/L / ALT: 19 U/L / AST: 22 U/L / GGT: x               BNP    IMAGING:    ASSESSMENT:  88 y/o M     h/o  chronic anemia, CAD, HTN, HLD,    Afib , not on eliquis,/ PPM/  AICD,  BPH, AAA (had saccular dilation w/ stent placement August 2022),     c/c  CHF EF 35% on torsemide prn, colonic polyps      presenting to the ED with weakness.     Patient is a poor historian, states he felt weak today and could not walk    Per EMS, patient was 80% on RA when they arrived at his house and was c/o difficulty breathing.   recently admitted to OSH for anemia. (17 Dec 2022 12:59)      PLAN:     MEDICATIONS  (STANDING):  aspirin  chewable 81 milliGRAM(s) Oral daily  atorvastatin 20 milliGRAM(s) Oral at bedtime  cefTRIAXone   IVPB 2000 milliGRAM(s) IV Intermittent every 24 hours  furosemide   Injectable 20 milliGRAM(s) IV Push two times a day  heparin   Injectable 5000 Unit(s) SubCutaneous every 12 hours  lactulose Syrup 10 Gram(s) Oral two times a day  midodrine. 5 milliGRAM(s) Oral three times a day      Canelo Chauhan MD, FACC, AUBREY, RICA, FACP  Director, Heart Failure Services  [facility]  , Department of Cardiology  [facility]     CHIEF COMPLAINT:  Patient is a 87y old  Male who presents with a chief complaint of weakness/sob (18 Dec 2022 10:33)      HPI:  87-year-old with hypertension, dyslipidemia, CAD, anemia, A. fib, not on anticoagulation, pacemaker ICD, saccular abdominal aortic aneurysm and stent placement this past August 2022, heart failure reduced ejection fraction with EF 35%, admitted with weakness unable to walk no findings of sepsis.  Had ICD discharge x7 for ventricular tachycardia.  [employer]tr[employer] device interrogated.  Failed ATP and appropriate ICD discharges for ventricular tachycardia noted.  He is resting comfortably in bed in no acute distress.  Discussed with patient's wife at bedside and daughter by telephone as well as patient's cardiologist Dr. Tigre Donahue by telephone as well.    ALLERGIES:  Allergies    codeine (Nausea)  hazelnuts, facial swelling (Other)    Intolerances      Home Medications:  alfuzosin 10 mg oral tablet, extended release: 1 tab(s) orally once a day (03 Dec 2022 09:26)  Coreg 3.125 mg oral tablet: 1 tab(s) orally 2 times a day (03 Dec 2022 09:24)  fluticasone nasal 27.5 mcg/inh nasal spray: 1 spray(s) nasal once a day, As Needed (16 Jun 2015 10:30)  fluticasone propionate: 50 micrograms as needed (03 Dec 2022 09:26)  glycopyrrolate 1 mg oral tablet: 1 tab(s) orally 2 times a day (03 Dec 2022 09:25)  losartan 25 mg oral tablet: 1 tab(s) orally once a day (at bedtime) (16 Jun 2015 10:30)  multivitamin: daily (03 Dec 2022 09:27)  Percocet 5/325 oral tablet: 1 tab(s) orally every 4 hours, As Needed for pain (18 Jun 2015 18:33)  ranitidine 300 mg oral capsule: 1 cap(s) orally once a day (at bedtime) (16 Jun 2015 10:30)  Singulair 10 mg oral tablet: 1 tab(s) orally once a day (03 Dec 2022 09:26)  spironolactone: 12.5 milligram(s) orally once a day (03 Dec 2022 09:24)  super b complex 1 po daily:    (16 Jun 2015 10:30)  torsemide 20 mg oral tablet: orally once a week (04 Dec 2022 14:43)  Trelegy Ellipta 200 mcg-62.5 mcg-25 mcg/inh inhalation powder: 1 puff(s) inhaled once a day (03 Dec 2022 09:24)  Vitamin C 500 mg oral capsule: 1 cap(s) orally once a day (03 Dec 2022 09:26)  vitamin c 500 mg po daily:    (16 Jun 2015 10:30)  Vitamin D3 2000 intl units oral capsule: 1 cap(s) orally once a day (16 Jun 2015 10:30)    PAST MEDICAL & SURGICAL HISTORY:  SSS (sick sinus syndrome)  AICD placed 11/06 generator change 10/09      BPH (benign prostatic hyperplasia)      HTN (hypertension)      CHF (congestive heart failure)      GERD (gastroesophageal reflux disease)      Peripheral edema      CAD (coronary artery disease)  MI in 1994 balloon angioplasty      HLD (hyperlipidemia)      A-fib  dx 1981      PITO (obstructive sleep apnea)  sleeps with O2 unable to tolerate cpap      IBS (irritable bowel syndrome)  with constipation      Hernia  bilateral IHR 1991      AICD (automatic cardioverter/defibrillator) present  placed 2006, replaced 2009      H/O angioplasty  1994      S/P TURP (status post transurethral resection of prostate)  x 2- Oct, Nov 2014            FAMILY HISTORY:  n/a    SOCIAL HISTORY:  no tobacco    REVIEW OF SYSTEMS:  General:  No wt loss, fevers, chills, night sweats  Eyes:  Good vision, no reported pain  ENT:  No sore throat, pain, runny nose, dysphagia  CV:  No pain, palpitations, hypo/hypertension  Resp:  No dyspnea, cough, tachypnea, wheezing  GI:  No pain, nausea, vomiting, diarrhea, constipation  :  No pain, bleeding, incontinence, nocturia  Muscle:  No pain, weakness  Breast:  No pain, abscess, mass, discharge  Neuro:  No weakness, tingling, memory problems  Psych:  No fatigue, insomnia, mood problems, depression  Endocrine:  No polyuria, polydipsia, cold/heat intolerance  Heme:  No petechiae, ecchymosis, easy bruisability  Skin:  No rash, tattoos, scars, edema    PHYSICAL EXAM:  Vital Signs:  Vital Signs Last 24 Hrs  T(C): 36.4 (19 Dec 2022 11:21), Max: 36.9 (19 Dec 2022 04:43)  T(F): 97.5 (19 Dec 2022 11:21), Max: 98.4 (19 Dec 2022 04:43)  HR: 81 (19 Dec 2022 11:21) (81 - 92)  BP: 119/81 (19 Dec 2022 11:21) (105/71 - 119/81)  RR: 17 (19 Dec 2022 11:21) (17 - 22)  SpO2: 100% (19 Dec 2022 11:21) (94% - 100%)    Parameters below as of 19 Dec 2022 11:21  Patient On (Oxygen Delivery Method): nasal cannula      I&O's Summary    19 Dec 2022 07:01  -  19 Dec 2022 14:22  --------------------------------------------------------  IN: 160 mL / OUT: 0 mL / NET: 160 mL      I&O's Detail    19 Dec 2022 07:01  -  19 Dec 2022 14:22  --------------------------------------------------------  IN:    Oral Fluid: 160 mL  Total IN: 160 mL    OUT:  Total OUT: 0 mL    Total NET: 160 mL          Tele: afib ppm    Constitutional: well developed, normal appearance, well groomed, well nourished, no deformities and no acute distress.   Eyes: the conjunctiva exhibited no abnormalities and the eyelids demonstrated no xanthelasmas.   HEENT: normal oral mucosa, no oral pallor and no oral cyanosis.   Neck: normal jugular venous A waves present, normal jugular venous V waves present and no jugular venous ríos A waves.   Pulmonary: no respiratory distress, normal respiratory rhythm and effort, no accessory muscle use and lungs were clear to auscultation bilaterally.   Cardiovascular: heart rate and rhythm were normal, normal S1 and S2 and no murmur, gallop, rub, heave or thrill are present.   Abdomen: soft, non-tender, no hepato-splenomegaly and no abdominal mass palpated.   Musculoskeletal: the gait could not be assessed..   Extremities: no clubbing of the fingernails, no localized cyanosis, no petechial hemorrhages and no ischemic changes.   Skin: normal skin color and pigmentation, no rash, no venous stasis, no skin lesions, no skin ulcer and no xanthoma was observed.   Psychiatric: oriented to person, place, and time, the affect was normal, the mood was normal and not feeling anxious.      LABORATORY:                          9.6    10.50 )-----------( 154      ( 19 Dec 2022 07:20 )             33.2     12-19    136  |  99  |  32<H>  ----------------------------<  96  3.8   |  33<H>  |  0.84    Ca    11.4<H>      19 Dec 2022 07:20  Phos  2.5     12-19  Mg     2.4     12-19    TPro  6.5  /  Alb  2.9<L>  /  TBili  1.5<H>  /  DBili  0.8<H>  /  AST  22  /  ALT  19  /  AlkPhos  162<H>  12-19        LIVER FUNCTIONS - ( 19 Dec 2022 07:20 )  Alb: 2.9 g/dL / Pro: 6.5 gm/dL / ALK PHOS: 162 U/L / ALT: 19 U/L / AST: 22 U/L / GGT: x           IMAGING:  < from: 12 Lead ECG (12.17.22 @ 08:10) >   Atrial-sensed ventricular-paced rhythm with prolonged AV conduction  Abnormal ECG  No previous ECGs available    < end of copied text >    < from: TTE Echo Complete w/o Contrast w/ Doppler (12.18.22 @ 14:52) >  Summary:   1. Left ventricular ejection fraction, by visual estimation, is 25 to   30%.   2. Technically limited study.   3. Severely decreased global left ventricular systolic function.   4. Severely enlarged left atrium.   5. Severely enlarged right atrium.   6. Basal and mid inferior septum and mid inferior segment are abnormal   as described above.   7. Global cardiomyopathy.   8.Mildly increased LV wall thickness.   9. Normal left ventricular internal cavity size.  10. The mitral in-flow pattern reveals no discernable A-wave, therefore   no comment on diastolic function can be made.  11. Mildly enlarged right ventricle.  12.Moderately reduced RV systolic function.  13. Small pericardial effusion.  14. Moderate mitral valve regurgitation.  15. The mitral valve leaflets are tethered which is due to reduced   systolic function and elevated LVDP.  16. Degenerative tricuspidvalve.  17. Moderate-severe tricuspid regurgitation.  18. Mild to moderate aortic regurgitation.  19. Sclerotic aortic valve with decreased opening.  20. Mild pulmonic valve regurgitation.  21. Estimated pulmonary artery systolic pressure is 52.6 mmHg assuming a   right atrial pressure of 20 mmHg, which is consistent with moderate   pulmonary hypertension.  22. Increased relative wall thickness with normal mass index consistent   with left ventricular concentric remodeling.  23. No evidence of valvular or lead vegetation. Also, finding of elevated   transaortic valve gradient is unclear.  24. There is mild aortic root calcification.    < end of copied text >    < from: CT Chest No Cont (12.18.22 @ 22:13) >  IMPRESSION:    Nonspecific interlobar septal thickening and groundglass opacities, which   can be seen in noninfectious (pulmonary edema given cardiomegaly and   pleural effusion) and infectious processes.    Nodularity along the right cervicothoracic junction trachea, which may be  due to secretion/debris. Focal lesion/pneumonia cannot be excluded.   Recommend follow-up.    Heterogenous pattern of decreased bone mineralization with lucencies,   which may be due to osteopenia. Marrow infiltrative/replacing process is   difficult to assess. Recommend comparison to previous outside study and   follow-up as indicated.    Age-indeterminate, contour deformity of right ribs. Recommend clinical   correlation.    Additional findings as described.    < end of copied text >    ASSESSMENT:8 y/o M   87-year-old with hypertension, dyslipidemia, CAD, anemia, A. fib, not on anticoagulation, pacemaker ICD, saccular abdominal aortic aneurysm and stent placement this past August 2022, heart failure reduced ejection fraction with EF 35%, admitted with weakness unable to walk no findings of sepsis.  Had ICD discharge x7 for ventricular tachycardia.  Medtronic device interrogated.  Failed ATP and appropriate ICD discharges for ventricular tachycardia noted.  He is resting comfortably in bed in no acute distress.  Discussed with patient's wife at bedside and daughter by telephone as well as patient's cardiologist Dr. Tigre Donahue by telephone as well.    PLAN:     Guideline directed medical therapies for heart failure on hold because of hypotension and sepsis management.  Continue aspirin and atorvastatin for CAD management.  Stay on heparin for DVT per management.  Continue midodrine 5 mg 3 times daily for blood pressure support.  Broad-spectrum antibiotics to continue.  Continue Lasix 20 mg IV twice daily for diuresis.  Add amiodarone PO loading for VT suppression.  Follow labs and continue telemetry monitoring.      Canelo Chauhan MD, FACC, FASBUSTER, RICA, FACP  Director, Heart Failure Services  [facility]  , Department of Cardiology  [facility]

## 2022-12-19 NOTE — PHYSICAL THERAPY INITIAL EVALUATION ADULT - ADDITIONAL COMMENTS
As per pt : There are 6 steps, c L rail up, at the entry of the coop and no steps to negotiate once indoors. Pt has home O2 and he uses at night c 2l/min.

## 2022-12-19 NOTE — CONSULT NOTE ADULT - ASSESSMENT
Streptococcus gordonii septicemia in 4/4 bottles.   This is a viridans streptococcus of the S. sanguinis  group typically found in dental plaque  No acute oral pathology noted  Viridans streptococci can be found in any part of the GI tract  This group is associated with infective endocarditis rather than abscesses  Multiple potential avenues to be further explored- endocarditis/device infection, compromise of graft, occult oral/dental pathology, relation to colon polypetc.    However assessment of cardiac status overrides infectious issues.  I asked  Dr. Chauhan to consult    Suggestions  Cardiology eval ASAP  Repeat blood cx/am  ECHO, perhaps MOE  Vascular f/u in light of new information  May require repeat CT a/p with contrast  F/U CBC, trend WBC Hb  Ceftriaxone increased to 2 grams    [place] guarded.    Thank you for the courtesy of this referral.  Rufino Aden MD  Attending Physician  [facility]  Division of Infectious Diseases  [phone]         Streptococcus gordonii septicemia in 4/4 bottles.   This is a viridans streptococcus of the S. sanguinis  group typically found in dental plaque  No acute oral pathology noted  Viridans streptococci can be found in any part of the GI tract  This group is associated with infective endocarditis rather than abscesses  Multiple potential avenues to be further explored- endocarditis/device infection, compromise of graft, occult oral/dental pathology, relation to colon polypetc.  Primary hyperparathyroidism      However assessment of cardiac status overrides infectious issues.  I asked  Dr. Chauhan to consult    Suggestions  Cardiology eval ASAP  Repeat blood cx/am  ECHO, perhaps MOE  Vascular f/u in light of new information  May require repeat CT a/p with contrast  F/U CBC, trend WBC Hb  Ceftriaxone increased to 2 grams    [place] guarded.    Thank you for the courtesy of this referral.  Rufino Aden MD  Attending Physician  [facility]  Division of Infectious Diseases  [phone]

## 2022-12-19 NOTE — PHYSICAL THERAPY INITIAL EVALUATION ADULT - PERTINENT HX OF CURRENT PROBLEM, REHAB EVAL
88yo man with a PMH of chronic anemia, CAD, HTN, HLD, afib on eliquis, BPH, AAA (had saccular dilation w/ stent placement August 2022), CHF EF 35% on torsemide prn and s/p AICD, colonic polyps w/ 2.5 cm remaining in transverse colon (to be evaled w/ Dr. Brenner outpt), recent endoscopy w/ Mendez's esophagus w/ anemia - baseline Hg ~8, borderline SBP ~90-100s as per pt and wife.  Found to be COVID 19 positive s/p remdesivir x 3d course completed 12/6  Since that time, he has remained very weak.   Unable to get OOB 12/16 and presented to the 12/17. Pt was admitted c Dx of acute UTI.

## 2022-12-19 NOTE — PROGRESS NOTE ADULT - ASSESSMENT
88 yo gentlemen with h/o chronic anemia, CAD, HTN, HLD, afib on eliquis, BPH, AAA (had saccular dilation w/ stent placement August 2022), CHF EF 35% on torsemide prn and s/p AICD, colonic polyps w/ 2.5 cm remaining in transverse colon (to be evaled w/ Dr. Brenner outpt), recent endoscopy w/ Mendez's esophagus w/ anemia, recent covid p/w weakness and hypoxia.     #Vtach/AICD firing  -Cards consulted, Discussed with Dr. Chauhan, will follow up recs    #Strep bacteremia  D/W ID, continue antibiotics as recommended  follow up echo/results    #Severe Sepsis 2/2 strep bactermia  Possible source urine  On admission with fever and later with soft BP requiring midodrine  ID now consulted    Chronic afib  Off of AC b/o recent bleed/anemia    #HTN  Patient now with hypotension and on midodrine, possibly due to infection    #AAA  Now stable, no vascular intervention D/W with vascular surgeon Dr. Chirinos

## 2022-12-19 NOTE — CONSULT NOTE ADULT - SUBJECTIVE AND OBJECTIVE BOX
[facility]  Division of Infectious Diseases  [phone]    LAUREL HAM  87y, Male  [id number]    HPI--  87M numerous medical issues including AICD placement, CAD, HTN, AF, AAA s/p stent/graft 8/2022 with concern of endoleak though workup without definitive evidence, CMP/CHF, colon polyps including large one that remains in TV colon pending further workup, Mendez's esophagus, anemia BM Bx ?result, recent COVID now presenting with fall/weakness. Per patient's wife attempted to stand and could not arise and 'was holding onto the dining room table for dear life' until help arrived.    Here admitted with concern of CHF, now known to have +BC for S. gordonii.    During my visit patient had a series of shocks from his AICD. Per [employer], had bursts of V Tach.     PMH/PSH--  SSS (sick sinus syndrome)    BPH (benign prostatic hyperplasia)    HTN (hypertension)    CHF (congestive heart failure)    GERD (gastroesophageal reflux disease)    Peripheral edema    CAD (coronary artery disease)    HLD (hyperlipidemia)    A-fib    PITO (obstructive sleep apnea)    IBS (irritable bowel syndrome)    Hernia    AICD (automatic cardioverter/defibrillator) present    H/O angioplasty    S/P TURP (status post transurethral resection of prostate)        Allergies--  codeine (Nausea)  hazelnuts, facial swelling (Other)      Medications--  Antibiotics: cefTRIAXone   IVPB 2000 milliGRAM(s) IV Intermittent every 24 hours    Immunologic:   Other: acetaminophen     Tablet .. PRN  aMIOdarone    Tablet  aMIOdarone    Tablet  aspirin  chewable  atorvastatin  furosemide   Injectable  heparin   Injectable  lactulose Syrup  midodrine.    Antimicrobials last 90 days per EMR: MEDICATIONS  (STANDING):  azithromycin  IVPB   255 mL/Hr IV Intermittent (12-17-22 @ 08:59)    cefTRIAXone   IVPB   100 mL/Hr IV Intermittent (12-17-22 @ 06:21)    cefTRIAXone   IVPB   100 mL/Hr IV Intermittent (12-18-22 @ 11:36)   100 mL/Hr IV Intermittent (12-17-22 @ 11:19)    cefTRIAXone   IVPB   100 mL/Hr IV Intermittent (12-19-22 @ 10:20)        Social History--  EtOH: denies   Tobacco: distant prior  Drug Use: denies     Family/Marital History--   >60y  No pertinent family history in first degree relatives  No significant family history (Father)        Travel/Environmental/Occupational History:  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   Review of systems otherwise negative except as previously noted.    Physical Exam--  Vital Signs: T(F): 97.5 (12-19-22 @ 11:21), Max: 98.4 (12-19-22 @ 04:43)  HR: 81 (12-19-22 @ 11:21)  BP: 119/81 (12-19-22 @ 11:21)  RR: 17 (12-19-22 @ 11:21)  SpO2: 100% (12-19-22 @ 11:21)  Wt(kg): --  General: Chromically ill-appearing Male in no acute distress.  HEENT: AT/NC.. Anicteric. Conjunctiva pink and moist. Oropharynx clear.   Neck: Not rigid. No sense of mass.  Nodes: None palpable.  Lungs: Diminished BS Bbilaterally without rales, wheezing or rhonchi  Heart: Regular rate and rhythm. II/Vi SM. No rub. No gallop. No palpable thrill.  Abdomen: Bowel sounds present and normoactive. Soft. Nondistended. Nontender. No sense of mass. No organomegaly.  Back: No spinal tenderness. No costovertebral angle tenderness.   Extremities: No cyanosis or clubbing. 1+katie. Wasted.  Skin: Warm. Dry. Good turgor. No rash. No vasculitic stigmata.  Psychiatric: Grossly appropriate affect and mood for situation.       Laboratory & Imaging Data--  CBC                        9.6    10.50 )-----------( 154      ( 19 Dec 2022 07:20 )             33.2       Chemistries  12-19    136  |  99  |  32<H>  ----------------------------<  96  3.8   |  33<H>  |  0.84    Ca    11.4<H>      19 Dec 2022 07:20  Phos  2.5     12-19  Mg     2.4     12-19    TPro  6.5  /  Alb  2.9<L>  /  TBili  1.5<H>  /  DBili  0.8<H>  /  AST  22  /  ALT  19  /  AlkPhos  162<H>  12-19      Culture Data    Culture - Urine (collected 17 Dec 2022 05:41)  Source: Clean Catch Clean Catch (Midstream)  Preliminary Report (19 Dec 2022 08:30):    >100,000 CFU/ml Escherichia coli    Culture - Blood (collected 17 Dec 2022 05:20)  Source: .Blood Blood-Venous  Gram Stain (prelim) (18 Dec 2022 01:02):    Growth in aerobic bottle: Gram positive cocci in pairs    Growth in anaerobic bottle: Gram positive cocci in pairs  Preliminary Report (18 Dec 2022 20:01):    Growth in aerobic and anaerobic bottles: Streptococcus gordonii    Susceptibility to follow.    ***Blood Panel PCR results on this specimen are available    approximately 3 hours after the Gram stain result.***    Gram stain, PCR, and/or culture results may not always    correspond due to difference in methodologies.    ************************************************************    This PCR assay was performed by multiplex PCR. This    Assay tests for 66 bacterial and resistance gene targets.    Please refer Health system Labs test directory    at [email or web address] for details.  Organism: Blood Culture PCR (18 Dec 2022 00:59)  Organism: Blood Culture PCR (18 Dec 2022 00:59)    Culture - Blood (collected 17 Dec 2022 05:01)  Source: .Blood Blood-Peripheral  Gram Stain (prelim) (18 Dec 2022 01:01):    Growth in anaerobic bottle: Gram positive cocci in pairs    Growth in aerobic bottle: Gram positive cocci in pairs  Preliminary Report (18 Dec 2022 19:59):    Growth in aerobic and anaerobic bottles: Streptococcus gordonii    See previous culture [id number]    < from: CT Chest No Cont (12.18.22 @ 22:13) >  ACC: [id number] EXAM:  CT CHEST                        PROCEDURE DATE:  12/18/2022    INTERPRETATION:  CLINICAL INFORMATION: Difficulty breathing, CHF.    PROCEDURE: CT of the chest was performed without intravenous contrast.   Coronal and sagittal reconstruction images were obtained.    CONTRAST/COMPLICATIONS:  IV Contrast: NONE  Oral Contrast: NONE  Complications: None reported at time of study completion    COMPARISON: 12/3/2022.    FINDINGS: Evaluation of the thoracic organs/vasculature is limited   without intravenous contrast. Artifact from the patient's arms and   respiratory motion degrades images.    LUNGS AND AIRWAYS: Patent central airways. Nodularity along the right   cervicothoracic junction trachea (6:32). Interlobar septal thickening and   groundglass opacities. Patchy opacities at bilateral lower lobes and   lingula. Scattered calcified granulomas.  PLEURA: Small right > pleural effusion.  HEART: Mild cardiomegaly. Small pericardial effusion. Aortic valve and   mitral annular calcification.  VESSELS: Atherosclerosis. Normal caliber of the thoracic aorta.  MEDIASTINUM AND CHRISTOPHER: Subcentimeter lymph nodes without lymphadenopathy.  CHEST WALL AND LOWER NECK: Left-sided AICD. Suggest anasarca.  UPPER ABDOMEN: Prominent wall of the distal esophagus and stomach.   Cholelithiasis. Similar configuration of the spleen to the prior study.   Bilateral perinephric stranding. Partial visualized stent at the   visualized upper abdominal aorta.  BONES: Heterogeneous pattern of decreased bone mineralization with   lucencies. Degenerative changes of the spine. Age-indeterminate anterior   wedging of the spine. Age-indeterminate deformity of the right fifth   through eighth lateral ribs.    IMPRESSION:  Nonspecific interlobar septal thickening and groundglass opacities, which   can be seen in noninfectious (pulmonary edema given cardiomegaly and   pleural effusion) and infectious processes.    Nodularity along the right cervicothoracic junction trachea, which may be  due to secretion/debris. Focal lesion/pneumonia cannot be excluded.   Recommend follow-up.    Heterogenous pattern of decreased bone mineralization with lucencies,   which may be due to osteopenia. Marrow infiltrative/replacing process is   difficult to assess. Recommend comparison to previous outside study and   follow-up as indicated.    Age-indeterminate, contour deformity of right ribs. Recommend clinical   correlation.    Additional findings as described.    --- End of Report ---  ALEXANDRE JO MD; Attending Radiologist  This document has been electronically signed. Dec 19 2022 12:14AM    < end of copied text >    < from: CT Angio Abdomen and Pelvis w/ IV Cont (12.03.22 @ 15:49) >  IMPRESSION:  Abdominal aortic endograft with 2 adjacent abdominal aortic aneurysms   measuring 6.5 cm and 3.7 cm with an endoleak into the more inferior   aneurysm; there appears to be a discontinuity in the graft at the level   of the leak suspicious for a type III endoleak.    Moderate cardiomegaly.    Small partially loculated right pleural effusion.    The above findings of endoleak represent a change from the preliminary   interpretation of no endoleak and were discussed with Dr. FELIPE Fontanez by   Dr. Molina with read back confirmation at 4:15 PM on 12/4/2022.  < end of copied text >       [facility]  Division of Infectious Diseases  [phone]    LAUREL HAM  87y, Male  [id number]    HPI--  87M numerous medical issues including AICD placement, CAD, HTN, AF, AAA s/p stent/graft 8/2022 with concern of endoleak though workup without definitive evidence, CMP/CHF, colon polyps including large one that remains in TV colon pending further workup, Mendez's esophagus, anemia BM Bx ?result, recent COVID now presenting with fall/weakness. Per patient's wife attempted to stand and could not arise and 'was holding onto the dining room table for dear life' until help arrived.    Here admitted with concern of CHF, now known to have +BC for S. gordonii.    During my visit patient had a series of shocks from his AICD. Per [employer], had bursts of V Tach.     PMH/PSH--  SSS (sick sinus syndrome)    BPH (benign prostatic hyperplasia)    HTN (hypertension)    CHF (congestive heart failure)    GERD (gastroesophageal reflux disease)    Peripheral edema    CAD (coronary artery disease)    HLD (hyperlipidemia)    A-fib    PITO (obstructive sleep apnea)    IBS (irritable bowel syndrome)    Hernia    AICD (automatic cardioverter/defibrillator) present    H/O angioplasty    S/P TURP (status post transurethral resection of prostate)        Allergies--  codeine (Nausea)  hazelnuts, facial swelling (Other)      Medications--  Antibiotics: cefTRIAXone   IVPB 2000 milliGRAM(s) IV Intermittent every 24 hours    Immunologic:   Other: acetaminophen     Tablet .. PRN  aMIOdarone    Tablet  aMIOdarone    Tablet  aspirin  chewable  atorvastatin  furosemide   Injectable  heparin   Injectable  lactulose Syrup  midodrine.    Antimicrobials last 90 days per EMR: MEDICATIONS  (STANDING):  azithromycin  IVPB   255 mL/Hr IV Intermittent (12-17-22 @ 08:59)    cefTRIAXone   IVPB   100 mL/Hr IV Intermittent (12-17-22 @ 06:21)    cefTRIAXone   IVPB   100 mL/Hr IV Intermittent (12-18-22 @ 11:36)   100 mL/Hr IV Intermittent (12-17-22 @ 11:19)    cefTRIAXone   IVPB   100 mL/Hr IV Intermittent (12-19-22 @ 10:20)        Social History--  EtOH: denies   Tobacco: distant prior  Drug Use: denies     Family/Marital History--   >60y  No pertinent family history in first degree relatives  No significant family history (Father)        Travel/Environmental/Occupational History:  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   Review of systems otherwise negative except as previously noted.    Physical Exam--  Vital Signs: T(F): 97.5 (12-19-22 @ 11:21), Max: 98.4 (12-19-22 @ 04:43)  HR: 81 (12-19-22 @ 11:21)  BP: 119/81 (12-19-22 @ 11:21)  RR: 17 (12-19-22 @ 11:21)  SpO2: 100% (12-19-22 @ 11:21)  Wt(kg): --  General: Chromically ill-appearing Male in no acute distress.  HEENT: AT/NC.. Anicteric. Conjunctiva pink and moist. Oropharynx clear.   Neck: Not rigid. No sense of mass.  Nodes: None palpable.  Lungs: Diminished BS Bbilaterally without rales, wheezing or rhonchi  Heart: Regular rate and rhythm. II/Vi SM. No rub. No gallop. No palpable thrill.  Abdomen: Bowel sounds present and normoactive. Soft. Nondistended. Nontender. No sense of mass. No organomegaly.  Back: No spinal tenderness. No costovertebral angle tenderness.   Extremities: No cyanosis or clubbing. 1+katie. Wasted.  Skin: Warm. Dry. Good turgor. No rash. No vasculitic stigmata.  Psychiatric: Grossly appropriate affect and mood for situation.       Laboratory & Imaging Data--  CBC                        9.6    10.50 )-----------( 154      ( 19 Dec 2022 07:20 )             33.2       Chemistries  12-19    136  |  99  |  32<H>  ----------------------------<  96  3.8   |  33<H>  |  0.84    Ca    11.4<H>      19 Dec 2022 07:20  Phos  2.5     12-19  Mg     2.4     12-19    TPro  6.5  /  Alb  2.9<L>  /  TBili  1.5<H>  /  DBili  0.8<H>  /  AST  22  /  ALT  19  /  AlkPhos  162<H>  12-19    Intact PTH: 110: PTH METHOD: Roche  Guide for Interpretation of PTH and Calcium Results                           Calcium             PTH                           MG/DL               PG/ML  Normal                   8.4-10.5            15-65  Primary  Hyperparathyroidism      >10.5               >50  Non-PTH Hypercalcemia    >10.5               0-20  Hypoparathyroidism       <8.4                0-20  Pseudohypoparathyroid    <8.4                >50  This is intended as a guide only. Factors such as sunlight exposure,  Vitamin D status and renal function should be evaluated along with  clinical presentation. pg/mL (12.19.22 @ 07:20)    PTH Related Peptide: <2.0: This test was developed and its performance characteristics  determined by [employer]. It has not been cleared or approved  by the Food and Drug Administration.  Reference Range:  All Ages: <2.0  The PTHrP assay should not be used to exclude cancer or  screen tumor patients for humoral hypercalcemia of  malignancy (HHM). The results should always be assessed in  conjunction with the patients medical history, clinical  examination, and other findings. If test results are  clinically discordant, please contact the laboratory.  Performed At: [facility]  [address] [place] [address] [id number]  Angelique ROMNA MD Ph:[phone] pmol/L (12.04.22 @ 06:30)        Culture Data    Culture - Urine (collected 17 Dec 2022 05:41)  Source: Clean Catch Clean Catch (Midstream)  Preliminary Report (19 Dec 2022 08:30):    >100,000 CFU/ml Escherichia coli    Culture - Blood (collected 17 Dec 2022 05:20)  Source: .Blood Blood-Venous  Gram Stain (prelim) (18 Dec 2022 01:02):    Growth in aerobic bottle: Gram positive cocci in pairs    Growth in anaerobic bottle: Gram positive cocci in pairs  Preliminary Report (18 Dec 2022 20:01):    Growth in aerobic and anaerobic bottles: Streptococcus gordonii    Susceptibility to follow.    ***Blood Panel PCR results on this specimen are available    approximately 3 hours after the Gram stain result.***    Gram stain, PCR, and/or culture results may not always    correspond due to difference in methodologies.    ************************************************************    This PCR assay was performed by multiplex PCR. This    Assay tests for 66 bacterial and resistance gene targets.    Please refer [facility] Labs test directory    at [email or web address] for details.  Organism: Blood Culture PCR (18 Dec 2022 00:59)  Organism: Blood Culture PCR (18 Dec 2022 00:59)    Culture - Blood (collected 17 Dec 2022 05:01)  Source: .Blood Blood-Peripheral  Gram Stain (prelim) (18 Dec 2022 01:01):    Growth in anaerobic bottle: Gram positive cocci in pairs    Growth in aerobic bottle: Gram positive cocci in pairs  Preliminary Report (18 Dec 2022 19:59):    Growth in aerobic and anaerobic bottles: Streptococcus gordonii    See previous culture [id number]    < from: CT Chest No Cont (12.18.22 @ 22:13) >  ACC: [id number] EXAM:  CT CHEST                        PROCEDURE DATE:  12/18/2022    INTERPRETATION:  CLINICAL INFORMATION: Difficulty breathing, CHF.    PROCEDURE: CT of the chest was performed without intravenous contrast.   Coronal and sagittal reconstruction images were obtained.    CONTRAST/COMPLICATIONS:  IV Contrast: NONE  Oral Contrast: NONE  Complications: None reported at time of study completion    COMPARISON: 12/3/2022.    FINDINGS: Evaluation of the thoracic organs/vasculature is limited   without intravenous contrast. Artifact from the patient's arms and   respiratory motion degrades images.    LUNGS AND AIRWAYS: Patent central airways. Nodularity along the right   cervicothoracic junction trachea (6:32). Interlobar septal thickening and   groundglass opacities. Patchy opacities at bilateral lower lobes and   lingula. Scattered calcified granulomas.  PLEURA: Small right > pleural effusion.  HEART: Mild cardiomegaly. Small pericardial effusion. Aortic valve and   mitral annular calcification.  VESSELS: Atherosclerosis. Normal caliber of the thoracic aorta.  MEDIASTINUM AND CHRISTOPHER: Subcentimeter lymph nodes without lymphadenopathy.  CHEST WALL AND LOWER NECK: Left-sided AICD. Suggest anasarca.  UPPER ABDOMEN: Prominent wall of the distal esophagus and stomach.   Cholelithiasis. Similar configuration of the spleen to the prior study.   Bilateral perinephric stranding. Partial visualized stent at the   visualized upper abdominal aorta.  BONES: Heterogeneous pattern of decreased bone mineralization with   lucencies. Degenerative changes of the spine. Age-indeterminate anterior   wedging of the spine. Age-indeterminate deformity of the right fifth   through eighth lateral ribs.    IMPRESSION:  Nonspecific interlobar septal thickening and groundglass opacities, which   can be seen in noninfectious (pulmonary edema given cardiomegaly and   pleural effusion) and infectious processes.    Nodularity along the right cervicothoracic junction trachea, which may be  due to secretion/debris. Focal lesion/pneumonia cannot be excluded.   Recommend follow-up.    Heterogenous pattern of decreased bone mineralization with lucencies,   which may be due to osteopenia. Marrow infiltrative/replacing process is   difficult to assess. Recommend comparison to previous outside study and   follow-up as indicated.    Age-indeterminate, contour deformity of right ribs. Recommend clinical   correlation.    Additional findings as described.    --- End of Report ---  ALEXANDRE JO MD; Attending Radiologist  This document has been electronically signed. Dec 19 2022 12:14AM    < end of copied text >    < from: CT Angio Abdomen and Pelvis w/ IV Cont (12.03.22 @ 15:49) >  IMPRESSION:  Abdominal aortic endograft with 2 adjacent abdominal aortic aneurysms   measuring 6.5 cm and 3.7 cm with an endoleak into the more inferior   aneurysm; there appears to be a discontinuity in the graft at the level   of the leak suspicious for a type III endoleak.    Moderate cardiomegaly.    Small partially loculated right pleural effusion.    The above findings of endoleak represent a change from the preliminary   interpretation of no endoleak and were discussed with Dr. FELIPE Fontanez by   Dr. Molina with read back confirmation at 4:15 PM on 12/4/2022.  < end of copied text >

## 2022-12-19 NOTE — PROGRESS NOTE ADULT - SUBJECTIVE AND OBJECTIVE BOX
Kasi Garcia MD  Academic Hospitalist  Pager [phone]  Email: [email or web address]  Available on [employer] Teams        PROGRESS NOTE:     Patient is a 87y old  Male who presents with a chief complaint of weakness/sob (18 Dec 2022 10:33)      SUBJECTIVE / OVERNIGHT EVENTS:  Patient seen and examined this morning and also in the afternoon. Blood cxs were positive for strep species and later in the day was called by the infectious disease physician to notify me of patient's AICD firing off and runs of V-tach. Subsequently spoke with Dr. Chauhan (cardiologist) who will interrogate the device and see the patient.   ADDITIONAL REVIEW OF SYSTEMS:  No f/c/n/v    Also discussed with patient and wife at bedside    MEDICATIONS  (STANDING):  aspirin  chewable 81 milliGRAM(s) Oral daily  atorvastatin 20 milliGRAM(s) Oral at bedtime  cefTRIAXone   IVPB 2000 milliGRAM(s) IV Intermittent every 24 hours  furosemide   Injectable 20 milliGRAM(s) IV Push two times a day  heparin   Injectable 5000 Unit(s) SubCutaneous every 12 hours  lactulose Syrup 10 Gram(s) Oral two times a day  midodrine. 5 milliGRAM(s) Oral three times a day    MEDICATIONS  (PRN):  acetaminophen     Tablet .. 650 milliGRAM(s) Oral every 6 hours PRN Temp greater or equal to 38C (100.4F), Mild Pain (1 - 3)      CAPILLARY BLOOD GLUCOSE        I&O's Summary    19 Dec 2022 07:01  -  19 Dec 2022 14:13  --------------------------------------------------------  IN: 160 mL / OUT: 0 mL / NET: 160 mL        PHYSICAL EXAM:  Vital Signs Last 24 Hrs  T(C): 36.4 (19 Dec 2022 11:21), Max: 36.9 (19 Dec 2022 04:43)  T(F): 97.5 (19 Dec 2022 11:21), Max: 98.4 (19 Dec 2022 04:43)  HR: 81 (19 Dec 2022 11:21) (81 - 92)  BP: 119/81 (19 Dec 2022 11:21) (105/71 - 119/81)  BP(mean): --  RR: 17 (19 Dec 2022 11:21) (17 - 22)  SpO2: 100% (19 Dec 2022 11:21) (94% - 100%)    Parameters below as of 19 Dec 2022 11:21  Patient On (Oxygen Delivery Method): nasal cannula        CONSTITUTIONAL: NAD, elderly, on NC, somewhat cachectic  RESPIRATORY: poor inspiratory effort, no wheezing.  CARDIOVASCULAR: Regular rate and rhythm, normal S1 and S2, no murmur/rub/gallop;   No lower extremity edema; ABDOMEN: Nontender to palpation, normoactive bowel sounds, no rebound/guarding;   MUSCLOSKELETAL: no clubbing or cyanosis of digits; no joint swelling or tenderness to palpation  PSYCH: A+O to person, place, and time; affect appropriate    LABS:                        9.6    10.50 )-----------( 154      ( 19 Dec 2022 07:20 )             33.2     12-19    136  |  99  |  32<H>  ----------------------------<  96  3.8   |  33<H>  |  0.84    Ca    11.4<H>      19 Dec 2022 07:20  Phos  2.5     12-19  Mg     2.4     12-19    TPro  6.5  /  Alb  2.9<L>  /  TBili  1.5<H>  /  DBili  0.8<H>  /  AST  22  /  ALT  19  /  AlkPhos  162<H>  12-19              Culture - Urine (collected 17 Dec 2022 05:41)  Source: Clean Catch Clean Catch (Midstream)  Preliminary Report (19 Dec 2022 08:30):    >100,000 CFU/ml Escherichia coli    Culture - Blood (collected 17 Dec 2022 05:20)  Source: .Blood Blood-Venous  Gram Stain (prelim) (18 Dec 2022 01:02):    Growth in aerobic bottle: Gram positive cocci in pairs    Growth in anaerobic bottle: Gram positive cocci in pairs  Preliminary Report (18 Dec 2022 20:01):    Growth in aerobic and anaerobic bottles: Streptococcus gordonii    Susceptibility to follow.    ***Blood Panel PCR results on this specimen are available    approximately 3 hours after the Gram stain result.***    Gram stain, PCR, and/or culture results may not always    correspond due to difference in methodologies.    ************************************************************    This PCR assay was performed by multiplex PCR. This    Assay tests for 66 bacterial and resistance gene targets.    [facility]    at [email or web address] for details.  Organism: Blood Culture PCR (18 Dec 2022 00:59)  Organism: Blood Culture PCR (18 Dec 2022 00:59)    Culture - Blood (collected 17 Dec 2022 05:01)  Source: .Blood Blood-Peripheral  Gram Stain (prelim) (18 Dec 2022 01:01):    Growth in anaerobic bottle: Gram positive cocci in pairs    Growth in aerobic bottle: Gram positive cocci in pairs  Preliminary Report (18 Dec 2022 19:59):    Growth in aerobic and anaerobic bottles: Streptococcus gordonii    See previous culture [id number]        RADIOLOGY & ADDITIONAL TESTS:  Results Reviewed:   Imaging Personally Reviewed:  Electrocardiogram Personally Reviewed:    COORDINATION OF CARE:  Care Discussed with Consultants/Other Providers [Y/N]:  Prior or Outpatient Records Reviewed [Y/N]:

## 2022-12-20 NOTE — PROGRESS NOTE ADULT - ASSESSMENT
86 yo gentlemen with h/o chronic anemia, CAD, HTN, HLD, afib on eliquis, BPH, AAA (had saccular dilation w/ stent placement August 2022), CHF EF 35% on torsemide prn and s/p AICD, colonic polyps w/ 2.5 cm remaining in transverse colon (to be evaled w/ Dr. Brenner outpt), recent endoscopy w/ Mendez's esophagus w/ anemia, recent covid p/w weakness and hypoxia.     #Vtach/AICD firing  -Cards consulted, Discussed with Dr. Chauhan, will follow up recs    #Strep bacteremia  D/W ID, continue antibiotics as recommended  follow up echo/results  CT a/p ordered    #Severe Sepsis 2/2 strep bactermia  Possible source urine  On admission with fever and later with soft BP requiring midodrine  ID now consulted    Chronic afib  Off of AC b/o recent bleed/anemia    #HTN  Patient now with hypotension and on midodrine, possibly due to infection    #AAA  Now stable, no vascular intervention D/W with vascular surgeon Dr. Chirinos

## 2022-12-20 NOTE — DIETITIAN INITIAL EVALUATION ADULT - OTHER INFO
Pt sleeping at time of visit; unable to obtain information regarding diet or wt hx.  Pt appears visibly underweight (especially for age), with visible signs of malnutrition (see below).  Per PCA, pt consuming 25-75% of meals; pt would likely benefit from easy to chew consistency & nutrition supplement for additional kcal & protein.

## 2022-12-20 NOTE — DIETITIAN INITIAL EVALUATION ADULT - PERTINENT LABORATORY DATA
12-20    136  |  98  |  30<H>  ----------------------------<  126<H>  3.9   |  36<H>  |  0.77    Ca    11.4<H>      20 Dec 2022 08:12  Phos  2.5     12-19  Mg     2.4     12-19    TPro  6.6  /  Alb  3.0<L>  /  TBili  1.6<H>  /  DBili  x   /  AST  38<H>  /  ALT  22  /  AlkPhos  168<H>  12-20

## 2022-12-20 NOTE — PROGRESS NOTE ADULT - ASSESSMENT
Streptococcus gordonii septicemia in 4/4 bottles.   This is a viridans streptococcus of the S. sanguinis  group typically found in dental plaque  No acute oral pathology noted  Viridans streptococci can be found in any part of the GI tract  This group is associated with infective endocarditis rather than abscesses  Multiple potential avenues to be further explored- endocarditis/device infection, compromise of graft, occult oral/dental pathology, relation to colon polypetc.  Primary hyperparathyroidism  However assessment of cardiac status overrides infectious issues.  I asked  Dr. Chauhan to consult    12/20: no fevers since 12/17, no leukocytosis, Cr ok, TTE performed - no evidence of valvular or lead vegetation, BCs x 2 repeated and pending result. UC with E. Coli, pt reports having urinary symptoms, on Ceftriaxone IV. New right LQ abd pain on today's exam, consider imaging.         Suggestions  Continue ceftriaxone IV 2 g q 24 hrs  Cardiology eval appreciated   f/up on repeat BCs  ECHO - no evidence of valvular or lead vegetation, technically limited study  need for MOE to be determined   consider CT a/p with contrast to evaluate for source of bacteremia and new abd pain  Vascular f/u in light of new information  F/U CBC, trend WBC Hb    [place] guarded.      Discussed with Dr. Aden  Discussed with Dr. Garcia

## 2022-12-20 NOTE — PROGRESS NOTE ADULT - SUBJECTIVE AND OBJECTIVE BOX
Kasi Garcia MD  Academic Hospitalist  Pager [phone]  Email: [email or web address]  Available on [employer] Teams        PROGRESS NOTE:     Patient is a 87y old  Male who presents with a chief complaint of ACUTE UTI     (20 Dec 2022 15:04)      SUBJECTIVE / OVERNIGHT EVENTS:  Patient seen and examined this morning. No new complaints this morning.  ADDITIONAL REVIEW OF SYSTEMS:  No reports of f/c/n/v    MEDICATIONS  (STANDING):  aMIOdarone    Tablet   Oral   aMIOdarone    Tablet 400 milliGRAM(s) Oral every 8 hours  aspirin  chewable 81 milliGRAM(s) Oral daily  atorvastatin 20 milliGRAM(s) Oral at bedtime  cefTRIAXone   IVPB 2000 milliGRAM(s) IV Intermittent every 24 hours  furosemide   Injectable 20 milliGRAM(s) IV Push two times a day  heparin   Injectable 5000 Unit(s) SubCutaneous every 12 hours  midodrine. 5 milliGRAM(s) Oral three times a day    MEDICATIONS  (PRN):  acetaminophen     Tablet .. 650 milliGRAM(s) Oral every 6 hours PRN Temp greater or equal to 38C (100.4F), Mild Pain (1 - 3)      CAPILLARY BLOOD GLUCOSE        I&O's Summary    19 Dec 2022 07:01  -  20 Dec 2022 07:00  --------------------------------------------------------  IN: 160 mL / OUT: 400 mL / NET: -240 mL        PHYSICAL EXAM:  Vital Signs Last 24 Hrs  T(C): 36.6 (20 Dec 2022 10:48), Max: 36.9 (19 Dec 2022 23:23)  T(F): 97.8 (20 Dec 2022 10:48), Max: 98.5 (19 Dec 2022 23:23)  HR: 79 (20 Dec 2022 15:55) (61 - 91)  BP: 103/68 (20 Dec 2022 15:55) (103/68 - 133/68)  BP(mean): --  RR: 17 (20 Dec 2022 15:55) (17 - 18)  SpO2: 98% (20 Dec 2022 15:55) (94% - 98%)    Parameters below as of 19 Dec 2022 16:10  Patient On (Oxygen Delivery Method): nasal cannula  O2 Flow (L/min): 4      CONSTITUTIONAL: NAD, elderly, on NC, somewhat cachectic  RESPIRATORY: poor inspiratory effort, no wheezing.  CARDIOVASCULAR: Regular rate and rhythm, normal S1 and S2, no murmur/rub/gallop;   No lower extremity edema;   ABDOMEN: mild diffuse tenderness, normoactive bowel sounds, no rebound/guarding;   MUSCLOSKELETAL: no clubbing or cyanosis of digits; no joint swelling or tenderness to palpation  PSYCH: A+O to person, place, and time; affect appropriate    LABS:                        9.9    9.56  )-----------( 183      ( 20 Dec 2022 08:12 )             34.9     12-20    136  |  98  |  30<H>  ----------------------------<  126<H>  3.9   |  36<H>  |  0.77    Ca    11.4<H>      20 Dec 2022 08:12  Phos  2.5     12-19  Mg     2.4     12-19    TPro  6.6  /  Alb  3.0<L>  /  TBili  1.6<H>  /  DBili  x   /  AST  38<H>  /  ALT  22  /  AlkPhos  168<H>  12-20                RADIOLOGY & ADDITIONAL TESTS:  Results Reviewed:   Imaging Personally Reviewed:  Electrocardiogram Personally Reviewed:    COORDINATION OF CARE:  Care Discussed with Consultants/Other Providers [Y/N]:  Prior or Outpatient Records Reviewed [Y/N]:

## 2022-12-20 NOTE — PROGRESS NOTE ADULT - NS ATTEND AMEND GEN_ALL_CORE FT
Attending Addendum--  Case reviewed with NP Tess Tello. Her note reviewed and modified as appropriate.   Patient personally assessed and examined.  For transfer to tertiary facility  Isolate is PCN-S  Abdomen without localizing tenderness on my exam later in the day, just 'sore all over'  Very [place]  D/W Cardiology NP  D/W Dr. Jose Aden MD  Attending Physician  [facility]  Division of Infectious Diseases  [phone]

## 2022-12-20 NOTE — DIETITIAN NUTRITION RISK NOTIFICATION - TREATMENT: THE FOLLOWING DIET HAS BEEN RECOMMENDED
Diet, Easy to Chew:   Low Sodium  Supplement Feeding Modality:  Oral  Ensure Plus High Protein Cans or Servings Per Day:  1       Frequency:  Two Times a day (12-20-22 @ 15:33) [Pending Verification By Attending]  Diet, Regular (12-17-22 @ 10:26) [Active]

## 2022-12-20 NOTE — PROGRESS NOTE ADULT - SUBJECTIVE AND OBJECTIVE BOX
Patient is a 87y old  Male who presents with a chief complaint of weakness     PAST MEDICAL & SURGICAL HISTORY:  SSS (sick sinus syndrome)    BPH (benign prostatic hyperplasia)    HTN (hypertension)    CHF (congestive heart failure)    GERD (gastroesophageal reflux disease)    Peripheral edema    CAD (coronary artery disease)  MI in 1994 balloon angioplasty    HLD (hyperlipidemia)    A-fib  dx 1981    PITO (obstructive sleep apnea)  sleeps with O2 unable to tolerate cpap    IBS (irritable bowel syndrome)  with constipation    Hernia  bilateral IHR 1991    AICD (automatic cardioverter/defibrillator) present  placed 2006, replaced 2009    H/O angioplasty  1994    S/P TURP (status post transurethral resection of prostate)  x 2- Oct, Nov 2014    saccular abdominal aortic aneurysm and stent placement 8/2022    INTERVAL HISTORY: resting in bed in no acute distress, mild sob   	  MEDICATIONS:  MEDICATIONS  (STANDING):  aMIOdarone    Tablet   Oral   aMIOdarone    Tablet 400 milliGRAM(s) Oral every 8 hours  aspirin  chewable 81 milliGRAM(s) Oral daily  atorvastatin 20 milliGRAM(s) Oral at bedtime  cefTRIAXone   IVPB 2000 milliGRAM(s) IV Intermittent every 24 hours  furosemide   Injectable 20 milliGRAM(s) IV Push two times a day  heparin   Injectable 5000 Unit(s) SubCutaneous every 12 hours  midodrine. 5 milliGRAM(s) Oral three times a day    MEDICATIONS  (PRN):  acetaminophen     Tablet .. 650 milliGRAM(s) Oral every 6 hours PRN Temp greater or equal to 38C (100.4F), Mild Pain (1 - 3)    Vitals:  T(F): 97.8 (12-20-22 @ 10:48), Max: 98.5 (12-19-22 @ 23:23)  HR: 61 (12-20-22 @ 13:51) (61 - 91)  BP: 120/76 (12-20-22 @ 13:51) (120/76 - 133/68)  RR: 18 (12-20-22 @ 10:48) (18 - 18)  SpO2: 94% (12-20-22 @ 10:48) (94% - 96%)    12-19 @ 07:01  -  12-20 @ 07:00  --------------------------------------------------------  IN:    Oral Fluid: 160 mL  Total IN: 160 mL    OUT:    Voided (mL): 400 mL  Total OUT: 400 mL    Total NET: -240 mL    Weight (kg): 57.1 (12-17 @ 17:21)  BMI (kg/m2): 18.6 (12-17 @ 17:21)    PHYSICAL EXAM:  Neuro: Awake, responsive  CV: S1 S2 RRR + SM  Lungs: few rales to bases   GI: Soft, BS +, ND, NT  Extremities: + LE edema    TELEMETRY: paced, frequent PVCs, short runs of NSVTs    RADIOLOGY: < from: CT Chest No Cont (12.18.22 @ 22:13) >    Nonspecific interlobar septal thickening and groundglass opacities, which   can be seen in noninfectious (pulmonary edema given cardiomegaly and   pleural effusion) and infectious processes.    Nodularity along the right cervicothoracic junction trachea, which may be  due to secretion/debris. Focal lesion/pneumonia cannot be excluded.   Recommend follow-up.    Heterogenous pattern of decreased bone mineralization with lucencies,   which may be due to osteopenia. Marrow infiltrative/replacing process is   difficult to assess. Recommend comparison to previous outside study and   follow-up as indicated.    Age-indeterminate, contour deformity of right ribs. Recommend clinical   correlation.    < end of copied text >    DIAGNOSTIC TESTING:    [x ] Echocardiogram: < from: TTE Echo Complete w/o Contrast w/ Doppler (12.18.22 @ 14:52) >  Left Ventricle: The left ventricular internal cavity size is normal. Left   ventricular wall thickness is mildly increased. Increased relative wall   thickness with normal mass index consistent with left ventricular   concentric remodeling.  Global LV systolic function was severely decreased. Left ventricular   ejection fraction, by visual estimation, is 25 to 30%. The mitral in-flow   pattern reveals nodiscernable A-wave, therefore no comment on diastolic   function can be made.  Findings are consistent with global cardiomyopathy.      LV Wall Scoring:  The basal and mid inferior septum and mid inferior segment are   hypokinetic.    Right Ventricle: The right ventricular size is mildly enlarged. RV   systolic function is moderately reduced.  Left Atrium: Severely enlarged left atrium.  Right Atrium: Severely enlarged right atrium.  Pericardium: A small pericardial effusion is present. The pericardial   effusion is localized near the left ventricle and posterior to the left   ventricle.  Mitral Valve: The mitral valve leaflets are tethered which is due to   reduced systolic function and elevated LVDP. Moderate mitral valve   regurgitation isseen.  Tricuspid Valve: The tricuspid valve is degenerative in appearance.   Moderate-severe tricuspid regurgitation is visualized. Estimated   pulmonary artery systolic pressure is 52.6 mmHg assuming a right atrial   pressure of 20 mmHg, which is consistent with moderate pulmonary   hypertension.  Aortic Valve: The aortic valve was not well visualized. The aortic valve   is trileaflet. Sclerotic aortic valve with decreased opening. The peak   aortic velocity was obtained from the apical view. Mild to moderate   aortic valve regurgitation is seen.  Pulmonic Valve: The pulmonic valve was not well visualized. Mild pulmonic   valve regurgitation.  Aorta: Aortic root measured at Sinus of Valsalva is normal. There is mild   aortic root calcification.  Venous: The inferior vena cava was normal sized, with respiratory size   variation less than 50%, consistent with elevated right atrial pressure.  Additional Comments: A ICD lead is visualized in the right atrium and   right ventricle.  In comparison to the previous echocardiogram(s): There are no prior   studies on this patient for comparison purposes.      Summary:   1. Left ventricular ejection fraction, by visual estimation, is 25 to   30%.   2. Technically limited study.   3. Severely decreased global left ventricular systolic function.   4. Severely enlarged left atrium.   5. Severely enlarged right atrium.   6. Basal and mid inferior septum and mid inferior segment are abnormal   as described above.   7. Global cardiomyopathy.   8.Mildly increased LV wall thickness.   9. Normal left ventricular internal cavity size.  10. The mitral in-flow pattern reveals no discernable A-wave, therefore   no comment on diastolic function can be made.  11. Mildly enlarged right ventricle.  12.Moderately reduced RV systolic function.  13. Small pericardial effusion.  14. Moderate mitral valve regurgitation.  15. The mitral valve leaflets are tethered which is due to reduced   systolic function and elevated LVDP.  16. Degenerative tricuspid valve.  17. Moderate-severe tricuspid regurgitation.  18. Mild to moderate aortic regurgitation.  19. Sclerotic aortic valve with decreased opening.  20. Mild pulmonic valve regurgitation.  21. Estimated pulmonary artery systolic pressure is 52.6 mmHg assuming a   right atrial pressure of 20 mmHg, which is consistent with moderate   pulmonary hypertension.  22. Increased relative wall thickness with normal mass index consistent   with left ventricular concentric remodeling.  23. No evidence of valvular or lead vegetation. Also, finding of elevated   transaortic valve gradient is unclear.  24. There is mild aortic root calcification.    < end of copied text >    LABS:	 	    CARDIAC MARKERS:  Troponin I, High Sensitivity Result: 90.2 ng/L (12-17 @ 16:15)    20 Dec 2022 08:12    136    |  98     |  30     ----------------------------<  126    3.9     |  36     |  0.77   19 Dec 2022 07:20    136    |  99     |  32     ----------------------------<  96     3.8     |  33     |  0.84   18 Dec 2022 08:00    137    |  100    |  35     ----------------------------<  95     3.8     |  35     |  1.01     Ca    11.4       20 Dec 2022 08:12  Phos  2.5       19 Dec 2022 07:20  Mg     2.4       19 Dec 2022 07:20    TPro  6.6    /  Alb  3.0    /  TBili  1.6    /  DBili  x      /  AST  38     /  ALT  22     /  AlkPhos  168    20 Dec 2022 08:12                       9.9    9.56  )-----------( 183      ( 20 Dec 2022 08:12 )             34.9 ,                       9.6    10.50 )-----------( 154      ( 19 Dec 2022 07:20 )             33.2 ,                       9.5    12.39 )-----------( 140      ( 18 Dec 2022 08:00 )             32.7   TSH: Thyroid Stimulating Hormone, Serum: 0.866 uIU/mL (12-19 @ 07:20)

## 2022-12-20 NOTE — PROGRESS NOTE ADULT - SUBJECTIVE AND OBJECTIVE BOX
LAUREL HAM  MRN-[id number]    Follow Up:  strep gordonii septicemia     Interval History: the pt was seen and examined earlier, no distress, awake, answers some of my questions but not all of them, able to state his name, partial birthday, states that he knows where he is but unable to verbalize it, stated current year correctly. The pt complained of Right sided abd pain and states that he has discomfort with urination when I asked the question. The pt is afebrile since 12/17.      PAST MEDICAL & SURGICAL HISTORY:  SSS (sick sinus syndrome)  AICD placed 11/06 generator change 10/09      BPH (benign prostatic hyperplasia)      HTN (hypertension)      CHF (congestive heart failure)      GERD (gastroesophageal reflux disease)      Peripheral edema      CAD (coronary artery disease)  MI in 1994 balloon angioplasty      HLD (hyperlipidemia)      A-fib  dx 1981      PITO (obstructive sleep apnea)  sleeps with O2 unable to tolerate cpap      IBS (irritable bowel syndrome)  with constipation      Hernia  bilateral IHR 1991      AICD (automatic cardioverter/defibrillator) present  placed 2006, replaced 2009      H/O angioplasty  1994      S/P TURP (status post transurethral resection of prostate)  x 2- Oct, Nov 2014          ROS:  limited, pt does not answer all of my questions, complains of right sided abd pain and burning with urination   [ ] Unobtainable because:  [ ] All other systems negative    Constitutional: no fever, no chills  Head: no trauma  Eyes: no vision changes, no eye pain  ENT:  no sore throat, no rhinorrhea  Cardiovascular:  no chest pain, no palpitation  Respiratory:  no SOB, no cough  GI:  no abd pain, no vomiting, no diarrhea  urinary: no dysuria, no hematuria, no flank pain  musculoskeletal:  no joint pain, no joint swelling  skin:  no rash  neurology:  no headache, no seizure, no change in mental status  psych: no anxiety, no depression         Allergies  codeine (Nausea)  hazelnuts, facial swelling (Other)        ANTIMICROBIALS:  cefTRIAXone   IVPB 2000 every 24 hours      OTHER MEDS:  acetaminophen     Tablet .. 650 milliGRAM(s) Oral every 6 hours PRN  aMIOdarone    Tablet   Oral   aMIOdarone    Tablet 400 milliGRAM(s) Oral every 8 hours  aspirin  chewable 81 milliGRAM(s) Oral daily  atorvastatin 20 milliGRAM(s) Oral at bedtime  furosemide   Injectable 20 milliGRAM(s) IV Push two times a day  heparin   Injectable 5000 Unit(s) SubCutaneous every 12 hours  midodrine. 5 milliGRAM(s) Oral three times a day      Vital Signs Last 24 Hrs  T(C): 36.6 (20 Dec 2022 10:48), Max: 36.9 (19 Dec 2022 23:23)  T(F): 97.8 (20 Dec 2022 10:48), Max: 98.5 (19 Dec 2022 23:23)  HR: 85 (20 Dec 2022 10:48) (73 - 91)  BP: 130/85 (20 Dec 2022 10:48) (124/74 - 133/68)  BP(mean): --  RR: 18 (20 Dec 2022 10:48) (18 - 18)  SpO2: 94% (20 Dec 2022 10:48) (94% - 96%)    Parameters below as of 19 Dec 2022 16:10  Patient On (Oxygen Delivery Method): nasal cannula  O2 Flow (L/min): 4      Physical Exam:  General: Chromically ill-appearing Male in no acute distress. Bitemporal wasting   HEENT: AT/NC.. Anicteric. Conjunctiva pink and moist. Oropharynx clear.   Neck: Not rigid. No sense of mass.  Nodes: None palpable.  Lungs: Diminished BS bilaterally without rales, wheezing or rhonchi  Heart: Regular rate and rhythm. II/Vi SM. No rub. No gallop. No palpable thrill. Left sided cardiac device   Abdomen: Bowel sounds present and normoactive. Soft. Nondistended. Tender in RLQ on today's exam. No sense of mass. No organomegaly.  Back: No spinal tenderness. No costovertebral angle tenderness.   Extremities: No cyanosis or clubbing. 1+katie. Wasted.  Skin: Warm. Dry. Good turgor. No rash. No vasculitic stigmata.  Psychiatric: mildly confused but overall appropriate affect and mood for situation.     WBC Count: 9.56 K/uL (12-20 @ 08:12)  WBC Count: 10.50 K/uL (12-19 @ 07:20)  WBC Count: 12.39 K/uL (12-18 @ 08:00)  WBC Count: 12.32 K/uL (12-17 @ 04:59)                            9.9    9.56  )-----------( 183      ( 20 Dec 2022 08:12 )             34.9       12-20    136  |  98  |  30<H>  ----------------------------<  126<H>  3.9   |  36<H>  |  0.77    Ca    11.4<H>      20 Dec 2022 08:12  Phos  2.5     12-19  Mg     2.4     12-19    TPro  6.6  /  Alb  3.0<L>  /  TBili  1.6<H>  /  DBili  x   /  AST  38<H>  /  ALT  22  /  AlkPhos  168<H>  12-20          Creatinine Trend: 0.77<--, 0.84<--, 1.01<--, 1.32<--, 1.02<--, 0.97<--      MICROBIOLOGY:  v  Clean Catch Clean Catch (Midstream)  12-17-22   >100,000 CFU/ml Escherichia coli  --  Escherichia coli      .Blood Blood-Venous  12-17-22   Growth in aerobic and anaerobic bottles: Streptococcus gordonii  ***Blood Panel PCR results on this specimen are available  approximately 3 hours after the Gram stain result.***  Gram stain, PCR, and/or culture results may not always  correspond due to difference in methodologies.  ************************************************************  This PCR assay was performed by multiplex PCR. This  Assay tests for 66 bacterial and resistance gene targets.  Please refer to the [facility] test directory  at [email or web address] for details.  --  Blood Culture PCR  Streptococcus gordonii      .Blood Blood-Peripheral  12-17-22   Growth in aerobic and anaerobic bottles: Streptococcus gordonii  See previous culture [id number]  --    Growth in anaerobic bottle: Gram positive cocci in pairs  Growth in aerobic bottle: Gram positive cocci in pairs    SARS-CoV-2 Result: NotDetec (12-17-22 @ 05:01)      RADIOLOGY:    < from: TTE Echo Complete w/o Contrast w/ Doppler (12.18.22 @ 14:52) >  Summary:   1. Left ventricular ejection fraction, by visual estimation, is 25 to   30%.   2. Technically limited study.   3. Severely decreased global left ventricular systolic function.   4. Severely enlarged left atrium.   5. Severely enlarged right atrium.   6. Basal and mid inferior septum and mid inferior segment are abnormal   as described above.   7. Global cardiomyopathy.   8.Mildly increased LV wall thickness.   9. Normal left ventricular internal cavity size.  10. The mitral in-flow pattern reveals no discernable A-wave, therefore   no comment on diastolic function can be made.  11. Mildly enlarged right ventricle.  12.Moderately reduced RV systolic function.  13. Small pericardial effusion.  14. Moderate mitral valve regurgitation.  15. The mitral valve leaflets are tethered which is due to reduced   systolic function and elevated LVDP.  16. Degenerative tricuspidvalve.  17. Moderate-severe tricuspid regurgitation.  18. Mild to moderate aortic regurgitation.  19. Sclerotic aortic valve with decreased opening.  20. Mild pulmonic valve regurgitation.  21. Estimated pulmonary artery systolic pressure is 52.6 mmHg assuming a   right atrial pressure of 20 mmHg, which is consistent with moderate   pulmonary hypertension.  22. Increased relative wall thickness with normal mass index consistent   with left ventricular concentric remodeling.  23. No evidence of valvular or lead vegetation. Also, finding of elevated   transaortic valve gradient is unclear.  24. There is mild aortic root calcification.      [id number] Canelo Chauhan MD FACC, FASE, FACP  Electronically signed on 12/19/2022 at 6:49:58 PM        *** Final ***    < end of copied text >

## 2022-12-20 NOTE — DIETITIAN INITIAL EVALUATION ADULT - REASON
Pt asleep, however with visible signs of muscle wasting and fat depletion in following regions: temples(severe), orbital(severe), buccal(severe), clavicle(severe)

## 2022-12-20 NOTE — DIETITIAN INITIAL EVALUATION ADULT - PERTINENT MEDS FT
MEDICATIONS  (STANDING):  aMIOdarone    Tablet   Oral   aMIOdarone    Tablet 400 milliGRAM(s) Oral every 8 hours  aspirin  chewable 81 milliGRAM(s) Oral daily  atorvastatin 20 milliGRAM(s) Oral at bedtime  cefTRIAXone   IVPB 2000 milliGRAM(s) IV Intermittent every 24 hours  furosemide   Injectable 20 milliGRAM(s) IV Push two times a day  heparin   Injectable 5000 Unit(s) SubCutaneous every 12 hours  midodrine. 5 milliGRAM(s) Oral three times a day    MEDICATIONS  (PRN):  acetaminophen     Tablet .. 650 milliGRAM(s) Oral every 6 hours PRN Temp greater or equal to 38C (100.4F), Mild Pain (1 - 3)

## 2022-12-20 NOTE — PROGRESS NOTE ADULT - ASSESSMENT
87-year-old M with hypertension, dyslipidemia, CAD, anemia, A. 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   Had ICD discharge x7 for ventricular tachycardia.  Medtr[employer] device interrogated.  Failed ATP and appropriate ICD discharges for ventricular tachycardia noted.    Blood Blood-Peripheral 12-17-22   Growth in aerobic and anaerobic bottles: Streptococcus gordonii    PLAN:     cont on telemetry  Cont amiodarone PO loading for VT suppression.   will add low dose bbl when BP improves   Unable to optimize Guideline directed medical therapies for heart failure because of hypotension  Continue aspirin and atorvastatin for CAD management.  Continue Lasix 20 mg IV twice daily for diuresis, monitor renal function and electrolytes   Continue midodrine 5 mg 3 times daily for blood pressure support.    antibiotics as per ID. follow up blood cultures  TTE with EF 25-30% with WMA, mod MR/pHTN, mod- severe TR, mild-mod AR, Moderately reduced RV systolic function, No evidence of valvular or lead vegetation, finding of elevated  transaortic valve gradient is unclear.  need for MOE to be determined as per ID. Both pt and family prefers pt to be transferred to [facility] where all his physician are for any further invasive interventions, will reach out to Dr. Dennison, Pt's EP physician   Case discussed with Dr. Mcgrath

## 2022-12-21 NOTE — PROGRESS NOTE ADULT - ASSESSMENT
Streptococcus gordonii septicemia in 4/4 bottles.   This is a viridans streptococcus of the S. sanguinis  group typically found in dental plaque  No acute oral pathology noted  Viridans streptococci can be found in any part of the GI tract  This group is associated with infective endocarditis rather than abscesses  Multiple potential avenues to be further explored- endocarditis/device infection, compromise of graft, occult oral/dental pathology, relation to colon polypetc.  Primary hyperparathyroidism  However assessment of cardiac status overrides infectious issues.  I asked  Dr. Chauhan to consult    12/20: no fevers since 12/17, no leukocytosis, Cr ok, TTE performed - no evidence of valvular or lead vegetation, BCs x 2 repeated and pending result. UC with E. Coli, pt reports having urinary symptoms, on Ceftriaxone IV. New right LQ abd pain on today's exam, consider imaging.   Attending Addendum--  Case reviewed with NP Tess Tello. Her note reviewed and modified as appropriate.   Patient personally assessed and examined.  For transfer to tertiary facility  Isolate is PCN-S  Abdomen without localizing tenderness on my exam later in the day, just 'sore all over'  Very [place] IRA  D/W Cardiology NP  D/W Dr. Garcia    12/21: less interactive today, CT head and CT a/p were performed, pending reading by radiology. The pt is afebrile, on NC, no leukocytosis, repeat BCs are pending result, ceftriaxone continued.        Suggestions  Continue ceftriaxone IV 2 g q 24 hrs  For transfer to tertiary facility  f/up on repeat BCs  ECHO - no evidence of valvular or lead vegetation, technically limited study  need for MOE to be determined   CT head performed - pending reading  CT a/p performed - pending reading   F/U CBC, trend WBC Hb    [place] guarded.      Discussed with Dr. Aden  Discussed with THAIS

## 2022-12-21 NOTE — PROGRESS NOTE ADULT - ASSESSMENT
87-year-old M with hypertension, dyslipidemia, CAD, anemia, A. 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   12/19/22 Had ICD discharge x7 for ventricular tachycardia.  Medtronic device interrogated.  Failed ATP and appropriate ICD discharges for ventricular tachycardia noted.    Blood Blood-Peripheral 12-17-22   Growth in aerobic and anaerobic bottles: Streptococcus gordonii  Pt found to be more lethargic, hemodynamically remains stable     PLAN:     cont on telemetry  CT head with no acute findings   ABG 7.37/72/179/41.6/99.5  Cont amiodarone PO loading for VT suppression. QTc monitoring while on amio loading    will add low dose bbl when BP improves   Unable to optimize Guideline directed medical therapies for heart failure because of hypotension  Continue aspirin and atorvastatin for CAD management.  Continue Lasix 20 mg IV twice daily for diuresis, monitor renal function and electrolytes   Continue midodrine 5 mg 3 times daily for blood pressure support.    antibiotics as per ID. follow up blood cultures  TTE with EF 25-30% with WMA, mod MR/pHTN, mod- severe TR, mild-mod AR, Moderately reduced RV systolic function, No evidence of valvular or lead vegetation, finding of elevated  transaortic valve gradient is unclear.  need for MOE to be determined as per ID. Both pt and family prefers pt to be transferred to [facility] where all his physician are for any further invasive interventions,  office of Dr. Dennison, Pt's EP physician and patient's cardiologist Dr. Tigre Donahue (), was contacted and was asked to initiate the transfer by calling , discussed with primary medicine team  Case discussed with Dr. Mcgrath

## 2022-12-21 NOTE — PROGRESS NOTE ADULT - NS ATTEND AMEND GEN_ALL_CORE FT
Attending Addendum--  Case reviewed with NP Tess Tello. Her note reviewed and modified as appropriate.   Patient personally assessed and examined.  Seen earlier this am.  Arousable but much less alert than prior interactions.  Case discussed with cardiology and reviewed with hospitalist, nursing team.  Physical exam not significantly changed c/w yesterday save for mental status.  F/U Cx pending.  Numerous active potential sources/consequences of bacteremia being investigated  Effort poor  Rufino Aden MD  Attending Physician  [facility]  Division of Infectious Diseases  [phone]

## 2022-12-21 NOTE — PROGRESS NOTE ADULT - NUTRITIONAL ASSESSMENT
This patient has been assessed with a concern for Malnutrition and has been determined to have a diagnosis/diagnoses of Severe protein-calorie malnutrition and Underweight (BMI < 19).    This patient is being managed with:   Diet Easy to Chew-  Low Sodium  Supplement Feeding Modality:  Oral  Ensure Plus High Protein Cans or Servings Per Day:  1       Frequency:  Two Times a day  Entered: Dec 20 2022  3:33PM    
This patient has been assessed with a concern for Malnutrition and has been determined to have a diagnosis/diagnoses of Severe protein-calorie malnutrition and Underweight (BMI < 19).    This patient is being managed with:   Diet Regular-  Entered: Dec 17 2022 10:24AM    The following pending diet order is being considered for treatment of Severe protein-calorie malnutrition and Underweight (BMI < 19):  Diet Easy to Chew-  Low Sodium  Supplement Feeding Modality:  Oral  Ensure Plus High Protein Cans or Servings Per Day:  1       Frequency:  Two Times a day  Entered: Dec 20 2022  3:33PM

## 2022-12-21 NOTE — PROGRESS NOTE ADULT - ASSESSMENT
88 yo gentlemen with h/o chronic anemia, CAD, HTN, HLD, afib on eliquis, BPH, AAA (had saccular dilation w/ stent placement August 2022), CHF EF 35% on torsemide prn and s/p AICD, colonic polyps w/ 2.5 cm remaining in transverse colon (to be evaled w/ Dr. Brenner outpt), recent endoscopy w/ Mendez's esophagus w/ anemia, recent covid p/w weakness and hypoxia.     Acute hypoxic respiratory failure:  - Recent COVID  - Acute on chronic HFrEF  - On 4L NC, SO2 100%, taper down  - CT : Small to moderate left pleural effusion with complete atelectasis of the left lower lobe; left lower lobe atelectasis is significantly worsened compared to the prior CT of the chest from 12/18/2022.  Moderate sized right pleural effusion with almost complete atelectasis of the right lower lobe, similar to prior. Groundglass opacities are seen in   the included right upper lobe and right middle lobe, new compared to the prior CT of the chest from 12/18/2022.  - On IV Lasix  - Fluid and salt restriction, daily wt  - Continue midodrine 5 mg 3 times daily for blood pressure support.    - TTE with EF 25-30% with WMA, mod MR/pHTN, mod- severe TR, mild-mod AR, Moderately reduced RV systolic function      #Vtach/AICD firing  -Cards consulted  - Cont amiodarone PO loading  - Unable to optimize Guideline directed medical therapies ( BB, MRA, ARB)  for heart failure because of hypotension  - Continue aspirin and atorvastatin for CAD management.  - Continue Lasix 20 mg IV twice daily for diuresis, monitor renal function and electrolytes   - Continue midodrine 5 mg 3 times daily for blood pressure support.    - TTE with EF 25-30% with WMA, mod MR/pHTN, mod- severe TR, mild-mod AR, Moderately reduced RV systolic function, No evidence of valvular or lead vegetation, finding of elevated  transaortic valve gradient is unclear.  - Need MOE.  - Both pt and family prefers pt to be transferred to [facility] where all his physician are for any further invasive interventions.  - As per discussion between our cardio team and Pt's cardiologist pt will be transferred to [facility] for further cardiac work  Discussed with accepting attending Dr. Pappas, Pt is accepted.  will tx in am if bed available. Dr. Dennison is pt's EP physician and patient's cardiologist Dr. Tigre Donahue ().  - Pt's family is aware    #Severe sepsis/ Strep bacteremia  - Continue Ceftriaxone   - TTE with EF 25-30% with WMA, mod MR/pHTN, mod- severe TR, mild-mod AR, Moderately reduced RV systolic function, No evidence of valvular or lead vegetation, finding of elevated  transaortic valve gradient is unclear.  - Need MOE.  - CT a/p with no source of infection  - ID following    #Chronic afib  Off of AC b/o recent bleed/anemia    #Lethargy:  - Arousable  - CT head with no acute finding  - ABG with chronic CO2 retention     #HTN  Patient now with hypotension and on midodrine, possibly due to infection    #AAA  Now stable, no vascular intervention D/W with vascular surgeon Dr. Chirinos   CT: Redemonstrated abdominal aortic endograft with 2 adjacent abdominal   aortic aneurysms measuring 6.5 cm and more inferiorly 3.7 cm.    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    Discussed with daughter Sheri on phone and Wife Angela at bedside.

## 2022-12-21 NOTE — PROGRESS NOTE ADULT - SUBJECTIVE AND OBJECTIVE BOX
HAMLAUREL  MRN-[id number]    Follow Up:  bacteremia    Interval History: the pt was seen and examined earlier, no distress, awake but less interactive today, does not answer my questions, now s/p CT head and CT a/p. The pt is afebrile, on NC, no leukocytosis.     PAST MEDICAL & SURGICAL HISTORY:  SSS (sick sinus syndrome)  AICD placed 11/06 generator change 10/09      BPH (benign prostatic hyperplasia)      HTN (hypertension)      CHF (congestive heart failure)      GERD (gastroesophageal reflux disease)      Peripheral edema      CAD (coronary artery disease)  MI in 1994 balloon angioplasty      HLD (hyperlipidemia)      A-fib  dx 1981      PITO (obstructive sleep apnea)  sleeps with O2 unable to tolerate cpap      IBS (irritable bowel syndrome)  with constipation      Hernia  bilateral IHR 1991      AICD (automatic cardioverter/defibrillator) present  placed 2006, replaced 2009      H/O angioplasty  1994      S/P TURP (status post transurethral resection of prostate)  x 2- Oct, Nov 2014          ROS:    [x ] Unobtainable because: pt does not answer my questions today   [ ] All other systems negative    Constitutional: no fever, no chills  Head: no trauma  Eyes: no vision changes, no eye pain  ENT:  no sore throat, no rhinorrhea  Cardiovascular:  no chest pain, no palpitation  Respiratory:  no SOB, no cough  GI:  no abd pain, no vomiting, no diarrhea  urinary: no dysuria, no hematuria, no flank pain  musculoskeletal:  no joint pain, no joint swelling  skin:  no rash  neurology:  no headache, no seizure, no change in mental status  psych: no anxiety, no depression         Allergies  codeine (Nausea)  hazelnuts, facial swelling (Other)        ANTIMICROBIALS:  cefTRIAXone   IVPB 2000 every 24 hours      OTHER MEDS:  acetaminophen     Tablet .. 650 milliGRAM(s) Oral every 6 hours PRN  aMIOdarone    Tablet   Oral   aMIOdarone    Tablet 400 milliGRAM(s) Oral every 8 hours  aspirin  chewable 81 milliGRAM(s) Oral daily  atorvastatin 20 milliGRAM(s) Oral at bedtime  furosemide   Injectable 20 milliGRAM(s) IV Push two times a day  heparin   Injectable 5000 Unit(s) SubCutaneous every 12 hours  midodrine. 5 milliGRAM(s) Oral three times a day      Vital Signs Last 24 Hrs  T(C): 35.2 (21 Dec 2022 11:11), Max: 36.8 (20 Dec 2022 23:37)  T(F): 95.4 (21 Dec 2022 11:11), Max: 98.2 (20 Dec 2022 23:37)  HR: 83 (21 Dec 2022 11:11) (79 - 88)  BP: 112/71 (21 Dec 2022 11:11) (103/68 - 146/75)  BP(mean): --  RR: 16 (21 Dec 2022 11:11) (16 - 22)  SpO2: 97% (21 Dec 2022 11:11) (86% - 100%)    Parameters below as of 21 Dec 2022 09:21    O2 Flow (L/min): 4      Physical Exam:  General: Chromically ill-appearing Male in no acute distress. Bitemporal wasting, NC  HEENT: AT/NC.. Anicteric. Conjunctiva pink and moist. Mouth - unable due to pt's non-compliance   Neck: Not rigid. No sense of mass.  Nodes: None palpable.  Lungs: Diminished BS bilaterally without rales, wheezing or rhonchi  Heart: Regular rate and rhythm. II/Vi SM. No rub. No gallop. No palpable thrill. Left sided cardiac device   Abdomen: Bowel sounds present and normoactive. Soft. Nondistended. Does not seem to be tender on today's exam. No sense of mass. No organomegaly.  Back: No spinal tenderness. No costovertebral angle tenderness.   Extremities: No cyanosis or clubbing. 1+katie. Wasted.  Skin: Warm. Dry. Good turgor. No rash. No vasculitic stigmata.  Psychiatric: confused       WBC Count: 6.88 K/uL (12-21 @ 07:50)  WBC Count: 9.56 K/uL (12-20 @ 08:12)  WBC Count: 10.50 K/uL (12-19 @ 07:20)  WBC Count: 12.39 K/uL (12-18 @ 08:00)  WBC Count: 12.32 K/uL (12-17 @ 04:59)                            10.0   6.88  )-----------( 173      ( 21 Dec 2022 07:50 )             35.8       12-21    138  |  99  |  36<H>  ----------------------------<  135<H>  4.1   |  36<H>  |  0.89    Ca    11.7<H>      21 Dec 2022 07:50    TPro  6.6  /  Alb  3.0<L>  /  TBili  1.3<H>  /  DBili  x   /  AST  32  /  ALT  19  /  AlkPhos  164<H>  12-21          Creatinine Trend: 0.89<--, 0.77<--, 0.84<--, 1.01<--, 1.32<--, 1.02<--      MICROBIOLOGY:  v  Clean Catch Clean Catch (Midstream)  12-17-22   >100,000 CFU/ml Escherichia coli  --  Escherichia coli      .Blood Blood-Venous  12-17-22   Growth in aerobic and anaerobic bottles: Streptococcus gordonii  ***Blood Panel PCR results on this specimen are available  approximately 3 hours after the Gram stain result.***  Gram stain, PCR, and/or culture results may not always  correspond due to difference in methodologies.  ************************************************************  This PCR assay was performed by multiplex PCR. This  Assay tests for 66 bacterial and resistance gene targets.  Please refer to the [facility] test directory  at [email or web address] for details.  --  Blood Culture PCR  Streptococcus gordonii      .Blood Blood-Peripheral  12-17-22   Growth in aerobic and anaerobic bottles: Streptococcus gordonii  See previous culture [id number]  --    Growth in anaerobic bottle: Gram positive cocci in pairs  Growth in aerobic bottle: Gram positive cocci in pairs      SARS-CoV-2 Result: NotDete (12-17-22 @ 05:01)      RADIOLOGY:

## 2022-12-21 NOTE — PROGRESS NOTE ADULT - SUBJECTIVE AND OBJECTIVE BOX
Patient is a 87y old  Male who presents with a chief complaint of weakness/sob (21 Dec 2022 14:35)    PAST MEDICAL & SURGICAL HISTORY:  SSS (sick sinus syndrome)    BPH (benign prostatic hyperplasia)    HTN (hypertension)    CHF (congestive heart failure)    GERD (gastroesophageal reflux disease)    Peripheral edema    CAD (coronary artery disease)  MI in 1994 balloon angioplasty    HLD (hyperlipidemia)    A-fib  dx 1981    PITO (obstructive sleep apnea)  sleeps with O2 unable to tolerate cpap    IBS (irritable bowel syndrome)  with constipation    Hernia  bilateral IHR 1991    AICD (automatic cardioverter/defibrillator) present  placed 2006, replaced 2009    H/O angioplasty  1994    S/P TURP (status post transurethral resection of prostate)  x 2- Oct, Nov 2014    INTERVAL HISTORY: lethargic with mild dyspnea   	  MEDICATIONS:  MEDICATIONS  (STANDING):  aMIOdarone    Tablet   Oral   aMIOdarone    Tablet 400 milliGRAM(s) Oral every 8 hours  aspirin  chewable 81 milliGRAM(s) Oral daily  atorvastatin 20 milliGRAM(s) Oral at bedtime  cefTRIAXone   IVPB 2000 milliGRAM(s) IV Intermittent every 24 hours  furosemide   Injectable 20 milliGRAM(s) IV Push two times a day  heparin   Injectable 5000 Unit(s) SubCutaneous every 12 hours  midodrine. 5 milliGRAM(s) Oral three times a day    MEDICATIONS  (PRN):  acetaminophen     Tablet .. 650 milliGRAM(s) Oral every 6 hours PRN Temp greater or equal to 38C (100.4F), Mild Pain (1 - 3)    Vitals:  T(F): 95.4 (12-21-22 @ 11:11), Max: 98.2 (12-20-22 @ 23:37)  HR: 83 (12-21-22 @ 11:11) (79 - 88)  BP: 112/71 (12-21-22 @ 11:11) (103/68 - 146/75)  RR: 16 (12-21-22 @ 11:11) (16 - 22)  SpO2: 97% (12-21-22 @ 11:11) (86% - 100%)    12-20 @ 07:01  -  12-21 @ 07:00  --------------------------------------------------------  IN:    Oral Fluid: 120 mL  Total IN: 120 mL    OUT:    Voided (mL): 500 mL  Total OUT: 500 mL    Total NET: -380 mL    PHYSICAL EXAM:  Neuro: lethargic   CV: S1 S2 RRR + SM  Lungs: diminished to bases   GI: Soft, BS +, ND, mild tenderness to lower abd  Extremities: + LE edema    TELEMETRY: paced, few PVCs    RADIOLOGY: < from: CT Head No Cont (12.21.22 @ 13:40) >  Age-appropriate involutional and ischemic gliotic changes. No   hemorrhage.    < from: CT Chest No Cont (12.18.22 @ 22:13) >    Nonspecific interlobar septal thickening and groundglass opacities, which   can be seen in noninfectious (pulmonary edema given cardiomegaly and   pleural effusion) and infectious processes.    Nodularity along the right cervicothoracic junction trachea, which may be  due to secretion/debris. Focal lesion/pneumonia cannot be excluded.   Recommend follow-up.    Heterogenous pattern of decreased bone mineralization with lucencies,   which may be due to osteopenia. Marrow infiltrative/replacing process is   difficult to assess. Recommend comparison to previous outside study and   follow-up as indicated.    Age-indeterminate, contour deformity of right ribs. Recommend clinical   correlation.    < end of copied text >    DIAGNOSTIC TESTING:    [x ] Echocardiogram: < from: TTE Echo Complete w/o Contrast w/ Doppler (12.18.22 @ 14:52) >  Left Ventricle: The left ventricular internal cavity size is normal. Left   ventricular wall thickness is mildly increased. Increased relative wall   thickness with normal mass index consistent with left ventricular   concentric remodeling.  Global LV systolic function was severely decreased. Left ventricular   ejection fraction, by visual estimation, is 25 to 30%. The mitral in-flow   pattern reveals no discernable A-wave, therefore no comment on diastolic   function can be made.  Findings are consistent with global cardiomyopathy.      LV Wall Scoring:  The basal and mid inferior septum and mid inferior segment are   hypokinetic.    Right Ventricle: The right ventricular size is mildly enlarged. RV   systolic function is moderately reduced.  Left Atrium: Severely enlarged left atrium.  Right Atrium: Severely enlarged right atrium.  Pericardium: A small pericardial effusion is present. The pericardial   effusion is localized near the left ventricle and posterior to the left   ventricle.  Mitral Valve: The mitral valve leaflets are tethered which is due to   reduced systolic function and elevated LVDP. Moderate mitral valve   regurgitation is seen.  Tricuspid Valve: The tricuspid valve is degenerative in appearance.   Moderate-severe tricuspid regurgitation is visualized. Estimated   pulmonary artery systolic pressure is 52.6 mmHg assuming a right atrial   pressure of 20 mmHg, which is consistent with moderate pulmonary   hypertension.  Aortic Valve: The aortic valve was not well visualized. The aortic valve   is trileaflet. Sclerotic aortic valve with decreased opening. The peak   aortic velocity was obtained from the apical view. Mild to moderate   aortic valve regurgitation is seen.  Pulmonic Valve: The pulmonic valve was not well visualized. Mild pulmonic   valve regurgitation.  Aorta: Aortic root measured at Sinus of Valsalva is normal. There is mild   aortic root calcification.  Venous: The inferior vena cava was normal sized, with respiratory size   variation less than 50%, consistent with elevated right atrial pressure.  Additional Comments: A ICD lead is visualized in the right atrium and   right ventricle.  In comparison to the previous echocardiogram(s): There are no prior   studies on this patient for comparison purposes.      Summary:   1. Left ventricular ejection fraction, by visual estimation, is 25 to   30%.   2. Technically limited study.   3. Severely decreased global left ventricular systolic function.   4. Severely enlarged left atrium.   5. Severely enlarged right atrium.   6. Basal and mid inferior septum and mid inferior segment are abnormal   as described above.   7. Global cardiomyopathy.   8.Mildly increased LV wall thickness.   9. Normal left ventricular internal cavity size.  10. The mitral in-flow pattern reveals no discernable A-wave, therefore   no comment on diastolic function can be made.  11. Mildly enlarged right ventricle.  12.Moderately reduced RV systolic function.  13. Small pericardial effusion.  14. Moderate mitral valve regurgitation.  15. The mitral valve leaflets are tethered which is due to reduced   systolic function and elevated LVDP.  16. Degenerative tricuspidvalve.  17. Moderate-severe tricuspid regurgitation.  18. Mild to moderate aortic regurgitation.  19. Sclerotic aortic valve with decreased opening.  20. Mild pulmonic valve regurgitation.  21. Estimated pulmonary artery systolic pressure is 52.6 mmHg assuming a   right atrial pressure of 20 mmHg, which is consistent with moderate   pulmonary hypertension.  22. Increased relative wall thickness with normal mass index consistent   with left ventricular concentric remodeling.  23. No evidence of valvular or lead vegetation. Also, finding of elevated   transaortic valve gradient is unclear.  24. There is mild aortic root calcification.    < end of copied text >    LABS:	 	    21 Dec 2022 07:50    138    |  99     |  36     ----------------------------<  135    4.1     |  36     |  0.89   20 Dec 2022 08:12    136    |  98     |  30     ----------------------------<  126    3.9     |  36     |  0.77   19 Dec 2022 07:20    136    |  99     |  32     ----------------------------<  96     3.8     |  33     |  0.84     Ca    11.7       21 Dec 2022 07:50    TPro  6.6    /  Alb  3.0    /  TBili  1.3    /  DBili  x      /  AST  32     /  ALT  19     /  AlkPhos  164    21 Dec 2022 07:50                        10.0   6.88  )-----------( 173      ( 21 Dec 2022 07:50 )             35.8 ,                       9.9    9.56  )-----------( 183      ( 20 Dec 2022 08:12 )             34.9 ,                       9.6    10.50 )-----------( 154      ( 19 Dec 2022 07:20 )             33.2   TSH: Thyroid Stimulating Hormone, Serum: 0.866 uIU/mL (12-19 @ 07:20)

## 2022-12-21 NOTE — PROGRESS NOTE ADULT - SUBJECTIVE AND OBJECTIVE BOX
Patient is a 87y old  Male who presents with a chief complaint of weakness/sob (21 Dec 2022 15:36)      INTERVAL HPI/OVERNIGHT EVENTS:  Pt was seen and examined, no acute events.      MEDICATIONS  (STANDING):  aMIOdarone    Tablet   Oral   aMIOdarone    Tablet 400 milliGRAM(s) Oral every 8 hours  aspirin  chewable 81 milliGRAM(s) Oral daily  atorvastatin 20 milliGRAM(s) Oral at bedtime  cefTRIAXone   IVPB 2000 milliGRAM(s) IV Intermittent every 24 hours  furosemide   Injectable 20 milliGRAM(s) IV Push two times a day  heparin   Injectable 5000 Unit(s) SubCutaneous every 12 hours  midodrine. 5 milliGRAM(s) Oral three times a day    MEDICATIONS  (PRN):  acetaminophen     Tablet .. 650 milliGRAM(s) Oral every 6 hours PRN Temp greater or equal to 38C (100.4F), Mild Pain (1 - 3)      Allergies    codeine (Nausea)  hazelnuts, facial swelling (Other)    Intolerances          Vital Signs Last 24 Hrs  T(C): 35.2 (21 Dec 2022 16:43), Max: 36.8 (20 Dec 2022 23:37)  T(F): 95.4 (21 Dec 2022 16:43), Max: 98.2 (20 Dec 2022 23:37)  HR: 78 (21 Dec 2022 16:43) (78 - 88)  BP: 103/60 (21 Dec 2022 16:43) (103/60 - 146/75)  BP(mean): --  RR: 16 (21 Dec 2022 16:43) (16 - 22)  SpO2: 97% (21 Dec 2022 16:43) (86% - 100%)    Parameters below as of 21 Dec 2022 09:21    O2 Flow (L/min): 4      PHYSICAL EXAM:  GENERAL: NAD  HEAD:  Atraumatic  EYES: PERRLA  ENMT: Mouth moist  NECK: Supple  NERVOUS SYSTEM:  Lethargic, arousbale   CHEST/LUNG: Clear  HEART: RRR, S1, S2  ABDOMEN: Soft, non tender  EXTREMITIES: no edema B/L LE   SKIN: No rash        LABS:                        10.0   6.88  )-----------( 173      ( 21 Dec 2022 07:50 )             35.8     12-21    138  |  99  |  36<H>  ----------------------------<  135<H>  4.1   |  36<H>  |  0.89    Ca    11.7<H>      21 Dec 2022 07:50    TPro  6.6  /  Alb  3.0<L>  /  TBili  1.3<H>  /  DBili  x   /  AST  32  /  ALT  19  /  AlkPhos  164<H>  12-21        CAPILLARY BLOOD GLUCOSE      POCT Blood Glucose.: 120 mg/dL (21 Dec 2022 13:23)      Culture - Blood (collected 20 Dec 2022 11:50)  Source: .Blood Blood-Peripheral  Preliminary Report (21 Dec 2022 15:11):    No growth to date.    Culture - Blood (collected 20 Dec 2022 11:20)  Source: .Blood Blood-Peripheral  Preliminary Report (21 Dec 2022 15:11):    No growth to date.    Culture - Urine (collected 17 Dec 2022 05:41)  Source: Clean Catch Clean Catch (Midstream)  Final Report (20 Dec 2022 09:13):    >100,000 CFU/ml Escherichia coli  Organism: Escherichia coli (20 Dec 2022 09:13)  Organism: Escherichia coli (20 Dec 2022 09:13)    Culture - Blood (collected 17 Dec 2022 05:20)  Source: .Blood Blood-Venous  Gram Stain (19 Dec 2022 17:15):    Growth in aerobic bottle: Gram positive cocci in pairs    Growth in anaerobic bottle: Gram positive cocci in pairs  Final Report (19 Dec 2022 17:15):    Growth in aerobic and anaerobic bottles: Streptococcus gordonii    ***Blood Panel PCR results on this specimen are available    approximately 3 hours after the Gram stain result.***    Gram stain, PCR, and/or culture results may not always    correspond due to difference in methodologies.    ************************************************************    This PCR assay was performed by multiplex PCR. This    Assay tests for 66 bacterial and resistance gene targets.    Please refer to the [facility] Labs test directory    at [email or web address] for details.  Organism: Blood Culture PCR  Streptococcus gordonii (19 Dec 2022 17:15)  Organism: Streptococcus gordonii (19 Dec 2022 17:15)  Organism: Blood Culture PCR (19 Dec 2022 17:15)    Culture - Blood (collected 17 Dec 2022 05:01)  Source: .Blood Blood-Peripheral  Gram Stain (19 Dec 2022 17:16):    Growth in anaerobic bottle: Gram positive cocci in pairs    Growth in aerobic bottle: Gram positive cocci in pairs  Final Report (19 Dec 2022 17:16):    Growth in aerobic and anaerobic bottles: Streptococcus gordonii    See previous culture [id number]      RADIOLOGY & ADDITIONAL TESTS:    Imaging Personally Reviewed:  [ ] YES  [ ] NO    Consultant(s) Notes Reviewed:  [ ] YES  [ ] NO    Care Discussed with Consultants/Other Providers [ ] YES  [ ] NO

## 2022-12-22 NOTE — PROGRESS NOTE ADULT - REASON FOR ADMISSION
weakness/sob

## 2022-12-22 NOTE — DISCHARGE NOTE PROVIDER - NSDCCPCAREPLAN_GEN_ALL_CORE_FT
PRINCIPAL DISCHARGE DIAGNOSIS  Diagnosis: Bacteremia  Assessment and Plan of Treatment: Acute hypoxic respiratory failure:  - Recent COVID  - Acute on chronic HFrEF  - On 4L NC, SO2 100%, taper down  - CT : Small to moderate left pleural effusion with complete atelectasis of the left lower lobe; left lower lobe atelectasis is significantly worsened compared to the prior CT of the chest from 12/18/2022.  Moderate sized right pleural effusion with almost complete atelectasis of the right lower lobe, similar to prior. Groundglass opacities are seen in   the included right upper lobe and right middle lobe, new compared to the prior CT of the chest from 12/18/2022.  - On IV Lasix  - Fluid and salt restriction, daily wt  - Continue midodrine 5 mg 3 times daily for blood pressure support.    - TTE with EF 25-30% with WMA, mod MR/pHTN, mod- severe TR, mild-mod AR, Moderately reduced RV systolic function  #Vtach/AICD firing  -Cards consulted  - Cont amiodarone PO loading  - Unable to optimize Guideline directed medical therapies ( BB, MRA, ARB)  for heart failure because of hypotension  - Continue aspirin and atorvastatin for CAD management.  - Continue Lasix 20 mg IV twice daily for diuresis, monitor renal function and electrolytes   - Continue midodrine 5 mg 3 times daily for blood pressure support.    - TTE with EF 25-30% with WMA, mod MR/pHTN, mod- severe TR, mild-mod AR, Moderately reduced RV systolic function, No evidence of valvular or lead vegetation, finding of elevated  transaortic valve gradient is unclear.  - Need MOE.  - Both pt and family prefers pt to be transferred to [facility] where all his physician are for any further invasive interventions.  - As per discussion between our cardio team and Pt's cardiologist pt will be transferred to [facility] for further cardiac work  Discussed with accepting attending Dr. Pappas, Pt is accepted. Dr. Dennison is pt's EP physician and patient's cardiologist Dr. Tigre Donahue ().  - Pt's family is aware  #Severe sepsis/ Strep bacteremia  - Continue Ceftriaxone   - TTE with EF 25-30% with WMA, mod MR/pHTN, mod- se

## 2022-12-22 NOTE — PROGRESS NOTE ADULT - SUBJECTIVE AND OBJECTIVE BOX
LAUREL HAM  MRN-[id number]    Follow Up:  bacteremia, positive UC, new CT chest findings     Interval History: the pt was seen and examined earlier, no distress, states that he is not feeling well overall, however, pt's presentation is much better today, awake and alert, able to carry a short conversation. The pt and I called the wife on pt's cell phone and informed her about pending transfer. The pt is afebrile, on NC, no leukocytosis.     PAST MEDICAL & SURGICAL HISTORY:  SSS (sick sinus syndrome)  AICD placed 11/06 generator change 10/09      BPH (benign prostatic hyperplasia)      HTN (hypertension)      CHF (congestive heart failure)      GERD (gastroesophageal reflux disease)      Peripheral edema      CAD (coronary artery disease)  MI in 1994 balloon angioplasty      HLD (hyperlipidemia)      A-fib  dx 1981      PITO (obstructive sleep apnea)  sleeps with O2 unable to tolerate cpap      IBS (irritable bowel syndrome)  with constipation      Hernia  bilateral IHR 1991      AICD (automatic cardioverter/defibrillator) present  placed 2006, replaced 2009      H/O angioplasty  1994      S/P TURP (status post transurethral resection of prostate)  x 2- Oct, Nov 2014          ROS:  limited: pt denies having cough, no chest pain, denies abd pain today, complains of being weak and tired  [ ] Unobtainable because:  [x ] All other systems negative    Constitutional: no fever, no chills  Head: no trauma  Eyes: no vision changes, no eye pain  ENT:  no sore throat, no rhinorrhea  Cardiovascular:  no chest pain, no palpitation  Respiratory:  no SOB, no cough  GI:  no abd pain, no vomiting, no diarrhea  urinary: no dysuria, no hematuria, no flank pain  musculoskeletal:  no joint pain, no joint swelling  skin:  no rash  neurology:  no headache, no seizure, no change in mental status  psych: no anxiety, no depression         Allergies  codeine (Nausea)  hazelnuts, facial swelling (Other)        ANTIMICROBIALS:  cefTRIAXone   IVPB 2000 every 24 hours      OTHER MEDS:  acetaminophen     Tablet .. 650 milliGRAM(s) Oral every 6 hours PRN  aMIOdarone    Tablet 400 milliGRAM(s) Oral every 8 hours  aMIOdarone    Tablet   Oral   aspirin  chewable 81 milliGRAM(s) Oral daily  atorvastatin 20 milliGRAM(s) Oral at bedtime  furosemide   Injectable 20 milliGRAM(s) IV Push two times a day  heparin   Injectable 5000 Unit(s) SubCutaneous every 12 hours  midodrine. 5 milliGRAM(s) Oral three times a day      Vital Signs Last 24 Hrs  T(C): 36.2 (22 Dec 2022 11:01), Max: 36.3 (22 Dec 2022 04:48)  T(F): 97.1 (22 Dec 2022 11:01), Max: 97.3 (22 Dec 2022 04:48)  HR: 80 (22 Dec 2022 11:01) (78 - 81)  BP: 99/61 (22 Dec 2022 11:01) (99/58 - 107/72)  BP(mean): --  RR: 18 (22 Dec 2022 11:01) (16 - 18)  SpO2: 98% (22 Dec 2022 11:01) (95% - 100%)    Parameters below as of 22 Dec 2022 04:48  Patient On (Oxygen Delivery Method): nasal cannula  O2 Flow (L/min): 2      Physical Exam:  General: Chromically ill-appearing Male in no acute distress. Bitemporal wasting, NC  HEENT: AT/NC. Anicteric. Conjunctiva pink and moist. Mouth - pt unable to open fully   Neck: Not rigid. No sense of mass.  Nodes: None palpable.  Lungs: Diminished BS bilaterally without rales, wheezing or rhonchi  Heart: Regular rate and rhythm. II/Vi SM. No rub. No gallop. No palpable thrill. Left sided cardiac device   Abdomen: Bowel sounds present and normoactive. Soft. Nondistended. Does not seem to be tender on today's exam. No sense of mass. No organomegaly.  Back: No spinal tenderness. No costovertebral angle tenderness.   Extremities: No cyanosis or clubbing. 1+katie. Wasted.  Skin: Warm. Dry. Good turgor. No rash. No vasculitic stigmata.  Psychiatric: more awake and alert today, answers questions     WBC Count: 6.97 K/uL (12-22 @ 07:20)  WBC Count: 6.88 K/uL (12-21 @ 07:50)  WBC Count: 9.56 K/uL (12-20 @ 08:12)  WBC Count: 10.50 K/uL (12-19 @ 07:20)  WBC Count: 12.39 K/uL (12-18 @ 08:00)  WBC Count: 12.32 K/uL (12-17 @ 04:59)                            10.0   6.97  )-----------( 198      ( 22 Dec 2022 07:20 )             34.9       12-22    142  |  99  |  36<H>  ----------------------------<  120<H>  3.5   |  40<H>  |  0.81    Ca    11.9<H>      22 Dec 2022 07:20    TPro  6.3  /  Alb  2.9<L>  /  TBili  1.1  /  DBili  x   /  AST  20  /  ALT  18  /  AlkPhos  154<H>  12-22          Creatinine Trend: 0.81<--, 0.89<--, 0.77<--, 0.84<--, 1.01<--, 1.32<--      MICROBIOLOGY:  v  .Blood Blood-Peripheral  12-20-22   No growth to date.  --  --      .Blood Blood-Peripheral  12-20-22   No growth to date.  --  --      Clean Catch Clean Catch (Midstream)  12-17-22   >100,000 CFU/ml Escherichia coli  --  Escherichia coli      .Blood Blood-Venous  12-17-22   Growth in aerobic and anaerobic bottles: Streptococcus gordonii  ***Blood Panel PCR results on this specimen are available  approximately 3 hours after the Gram stain result.***  Gram stain, PCR, and/or culture results may not always  correspond due to difference in methodologies.  ************************************************************  This PCR assay was performed by multiplex PCR. This  Assay tests for 66 bacterial and resistance gene targets.  Please refer to the [facility] [employer] ([employer]) Labs test directory  at [email or web address] for details.  --  Blood Culture PCR  Streptococcus gordonii      .Blood Blood-Peripheral  12-17-22   Growth in aerobic and anaerobic bottles: Streptococcus gordonii  See previous culture [id number]  --    Growth in anaerobic bottle: Gram positive cocci in pairs  Growth in aerobic bottle: Gram positive cocci in pairs    RADIOLOGY:    < from: CT Abdomen and Pelvis w/ IV Cont (12.21.22 @ 13:40) >    ACC: [id number] EXAM:  CT ABDOMEN AND PELVIS IC                          PROCEDURE DATE:  12/21/2022          INTERPRETATION:  CT ABDOMEN AND PELVIS WITH CONTRAST    Clinical Indication: sepsis with bacteremia    Technique: Axial CT images of the abdomen and pelvis were obtained from   the lung bases to the pubic symphysis after the administration of IV   contrast.  Coronal and sagittal reformatted images were created and   reviewed.    CONTRAST/COMPLICATIONS:  IV Contrast: Omnipaque 350  90 cc administered   10 cc discarded  Oral Contrast: NONE  Complications: None reported at time of study completion      Comparison:  Prior CT angiogram of the abdomen and pelvis from 12/3/2022.   CT of the chest from 12/8/2022.    Findings:  LOWER CHEST: Marked cardiomegaly. Pacemaker leads are partially   visualized. Coronary artery calcifications are present. There is a   moderate sized right pleural effusion with almost complete atelectasis of   the right lower lobe, similar to prior. Groundglass opacities are seen in   the included right upper lobe and right middle lobe, new compared to the   prior CT of the chest. There is a small to moderate left pleural effusion   with complete atelectasis of the left lower lobe; complete left lower   lobe atelectasis is new compared to the prior CT chest.  Scattered   calcified granulomas. Small, stable pericardial effusion.    LIVER: Within normal limits aside for a 1.0 cm hypodensity at the dome of   the right hepatic lobe, which is arthritis..  BILE DUCTS: Normal caliber.  GALLBLADDER: Gallstones are seen within the gallbladder.  SPLEEN: Limited evaluation. Nonspecific subcentimeter splenic   hypodensities are not characterized however without significant change.  PANCREAS: Limited visualization.  ADRENALS: Within normal limits.  KIDNEYS/URETERS: The kidneys appear somewhat atrophic. The kidneys   symmetrically. There is 2.0 cm hypodensity at the posterior region kidney   which likely represents a cyst.    BLADDER: The urinary bladder has a grossly unremarkable CT appearance.  REPRODUCTIVE ORGANS: The prostate is enlarged, and indents the base   bladder.    BOWEL: There is no evidence for bowel obstruction or  inflammation. The   appendix is not visualized; no pericecal inflammatory changes are seen to   suggest appendicitis. Abundant stool is seen right colon and transverse   colon.  PERITONEUM: No ascites.  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   There is an approximately 0.6 cm distance between the 2 aneurysms.  There   appears to be a discontinuity in the graft at the level of the more   inferior 3.7 cm aneurysm (series 602 image 61) suspicious for a type III   endoleak. Iliac and superior mesenteric arteries are patent. Renal   arteries are patent.    LYMPH NODES: No lymphadenopathy.  ABDOMINAL WALL: Within normal limits. Small amount of fluid in the left   inguinal canal.  Suggestion of anasarca.  BONES: No suspicious osseous lesion. Multilevel degenerative changes are   seen throughout the visualized spine. Mild dextrolumbar scoliosis.    IMPRESSION:  Small to moderate left pleural effusion with complete atelectasis of the   left lower lobe; left lower lobe atelectasis is significantly worsened   compared to the prior CT of the chest from 12/18/2022.    Moderate sized right pleural effusion with almost complete atelectasis of   the right lower lobe, similar to prior. Groundglass opacities are seen in   the included right upper lobe and right middle lobe, new compared to the   prior CT of the chest from 12/18/2022.    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    Cholelithiasis.    Prostatomegaly.    Other findings, as above.    --- End of Report ---            RUBY SERRA MD; Attending Radiologist  This document has been electronically signed. Dec 21 2022  3:56PM    < end of copied text >    < from: CT Head No Cont (12.21.22 @ 13:40) >    ACC: [id number] EXAM:  CT BRAIN                          PROCEDURE DATE:  12/21/2022          INTERPRETATION:  Clinical Indication: Altered mental status, bacteremia    5mm axial sections of the brain were obtained from base to vertex,   without the intravenous administration of contrast material. Coronal and   sagittal computer generated reconstructed views are available.    No prior brain imaging is available for comparison.      The ventricles and sulci are prominent consistent age appropriate  involutional changes. Small vessel white matter ischemic changes are   noted. There is no hemorrhage, mass, or shift of midline structures. Bone   window examination is unremarkable. Incidental calcifications in the   dentate nucleus of the cerebellum bilaterally. Calcification of the right   vertebral artery.    IMPRESSION: Age-appropriate involutional and ischemic gliotic changes. No   hemorrhage.    --- End of Report ---            MILADYS GREEN MD; Attending Radiologist  This document has been electronically signed. Dec 21 2022  3:15PM    < end of copied text >

## 2022-12-22 NOTE — DISCHARGE NOTE PROVIDER - ATTENDING DISCHARGE PHYSICAL EXAMINATION:
Vital Signs Last 24 Hrs  T(C): 36.2 (22 Dec 2022 11:01), Max: 36.3 (22 Dec 2022 04:48)  T(F): 97.1 (22 Dec 2022 11:01), Max: 97.3 (22 Dec 2022 04:48)  HR: 80 (22 Dec 2022 11:01) (78 - 81)  BP: 99/61 (22 Dec 2022 11:01) (99/58 - 107/72)  BP(mean): --  RR: 18 (22 Dec 2022 11:01) (16 - 18)  SpO2: 98% (22 Dec 2022 11:01) (95% - 100%)    Parameters below as of 22 Dec 2022 04:48  Patient On (Oxygen Delivery Method): nasal cannula  O2 Flow (L/min): 2    GENERAL: NAD  HEAD:  Atraumatic  EYES: PERRLA  ENMT: Mouth moist  NECK: Supple  NERVOUS SYSTEM:  Awake, alert  CHEST/LUNG: Clear  HEART: RRR, S1, S2  ABDOMEN: Soft, non tender  EXTREMITIES: no edema B/L LE   SKIN: No rash

## 2022-12-22 NOTE — DISCHARGE NOTE PROVIDER - DETAILS OF MALNUTRITION DIAGNOSIS/DIAGNOSES
This patient has been assessed with a concern for Malnutrition and was treated during this hospitalization for the following Nutrition diagnosis/diagnoses:     -  12/20/2022: Severe protein-calorie malnutrition   -  12/20/2022: Underweight (BMI < 19)

## 2022-12-22 NOTE — PROGRESS NOTE ADULT - ASSESSMENT
Streptococcus gordonii septicemia in 4/4 bottles.   This is a viridans streptococcus of the S. sanguinis  group typically found in dental plaque  No acute oral pathology noted  Viridans streptococci can be found in any part of the GI tract  This group is associated with infective endocarditis rather than abscesses  Multiple potential avenues to be further explored- endocarditis/device infection, compromise of graft, occult oral/dental pathology, relation to colon polypetc.  Primary hyperparathyroidism  However assessment of cardiac status overrides infectious issues.  I asked  Dr. Chauhan to consult    12/20: no fevers since 12/17, no leukocytosis, Cr ok, TTE performed - no evidence of valvular or lead vegetation, BCs x 2 repeated and pending result. UC with E. Coli, pt reports having urinary symptoms, on Ceftriaxone IV. New right LQ abd pain on today's exam, consider imaging.   Attending Addendum--  Case reviewed with NP Tess Tello. Her note reviewed and modified as appropriate.   Patient personally assessed and examined.  For transfer to tertiary facility  Isolate is PCN-S  Abdomen without localizing tenderness on my exam later in the day, just 'sore all over'  Very [place]  D/W Cardiology NP  D/W Dr. Garcia    12/21: less interactive today, CT head and CT a/p were performed, pending reading by radiology. The pt is afebrile, on NC, no leukocytosis, repeat BCs are pending result, ceftriaxone continued.    Attending Addendum--  Case reviewed with NP Tess Tello. Her note reviewed and modified as appropriate.   Patient personally assessed and examined.  Seen earlier this am.  Arousable but much less alert than prior interactions.  Case discussed with cardiology and reviewed with hospitalist, nursing team.  Physical exam not significantly changed c/w yesterday save for mental status.  F/U Cx pending.  Numerous active potential sources/consequences of bacteremia being investigated  [place] poor    12/22: CT head with no acute events, CT a/p - reviewed, mental status is better today, no fever, NC, no leukocytosis, Cr/LFTs ok, repeat BCs with no growth to date, Ceftriaxone IV continued, pt is for transfer to tertiary facility.     Suggestions  Continue ceftriaxone IV 2 g q 24 hrs  For transfer to tertiary facility  follow repeat BCs  ECHO - no evidence of valvular or lead vegetation, technically limited study  need for MOE to be determined   F/U CBC, trend WBC Hb  [place] guarded.      Discussed with Dr. Aden  Discussed with RN  Discussed with the pt and his wife

## 2022-12-22 NOTE — DISCHARGE NOTE PROVIDER - HOSPITAL COURSE
86 yo gentlemen with h/o chronic anemia, CAD, HTN, HLD, afib on eliquis, BPH, AAA (had saccular dilation w/ stent placement August 2022), CHF EF 35% on torsemide prn and s/p AICD, colonic polyps w/ 2.5 cm remaining in transverse colon (to be evaluated w/ Dr. Brenner outpt), recent endoscopy w/ Mendez's esophagus w/ anemia, recent covid p/w weakness and hypoxia.     Acute hypoxic respiratory failure:  - Recent COVID  - Acute on chronic HFrEF  - On 4L NC, SO2 100%, taper down  - CT : Small to moderate left pleural effusion with complete atelectasis of the left lower lobe; left lower lobe atelectasis is significantly worsened compared to the prior CT of the chest from 12/18/2022.  Moderate sized right pleural effusion with almost complete atelectasis of the right lower lobe, similar to prior. Groundglass opacities are seen in   the included right upper lobe and right middle lobe, new compared to the prior CT of the chest from 12/18/2022.  - On IV Lasix  - Fluid and salt restriction, daily wt  - Continue midodrine 5 mg 3 times daily for blood pressure support.    - TTE with EF 25-30% with WMA, mod MR/pHTN, mod- severe TR, mild-mod AR, Moderately reduced RV systolic function      #Vtach/AICD firing  -Cards consulted  - Cont amiodarone PO loading  - Unable to optimize Guideline directed medical therapies ( BB, MRA, ARB)  for heart failure because of hypotension  - Continue aspirin and atorvastatin for CAD management.  - Continue Lasix 20 mg IV twice daily for diuresis, monitor renal function and electrolytes   - Continue midodrine 5 mg 3 times daily for blood pressure support.    - TTE with EF 25-30% with WMA, mod MR/pHTN, mod- severe TR, mild-mod AR, Moderately reduced RV systolic function, No evidence of valvular or lead vegetation, finding of elevated  transaortic valve gradient is unclear.  - Need MOE.  - Both pt and family prefers pt to be transferred to [facility] where all his physician are for any further invasive interventions.  - As per discussion between our cardio team and Pt's cardiologist pt will be transferred to [facility] for further cardiac work  Discussed with accepting attending Dr. Pappas, Pt is accepted. Dr. Dennison is pt's EP physician and patient's cardiologist Dr. Tigre Donahue ().  - Pt's family is aware    #Severe sepsis/ Strep bacteremia  - Continue Ceftriaxone   - TTE with EF 25-30% with WMA, mod MR/pHTN, mod- severe TR, mild-mod AR, Moderately reduced RV systolic function, No evidence of valvular or lead vegetation, finding of elevated  transaortic valve gradient is unclear.  - Need MOE.  - CT a/p with no source of infection  - ID following    #Chronic afib  Off of AC b/o recent bleed/anemia    #Lethargy:  - Arousable  - CT head with no acute finding  - ABG with chronic CO2 retention     #HTN  Patient now with hypotension and on midodrine, possibly due to infection    #AAA  Now stable, no vascular intervention D/W with vascular surgeon Dr. Chirinos   CT: Redemonstrated abdominal aortic endograft with 2 adjacent abdominal   aortic aneurysms measuring 6.5 cm and more inferiorly 3.7 cm.    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    Discussed with daughter Sheri on phone and Wife Angela at bedside.

## 2022-12-22 NOTE — DISCHARGE NOTE PROVIDER - NSDCMRMEDTOKEN_GEN_ALL_CORE_FT
amiodarone 200 mg oral tablet: 1 tab(s) orally once a day  aspirin 81 mg oral tablet, chewable: 1 tab(s) orally once a day  atorvastatin 20 mg oral tablet: 1 tab(s) orally once a day (at bedtime)  cefTRIAXone: 2 gram(s) intravenous once a day  furosemide 100 mg/100 mL-0.9% intravenous solution: 20 milliliter(s) intravenous 2 times a day  glycopyrrolate 1 mg oral tablet: 1 tab(s) orally 2 times a day  midodrine 5 mg oral tablet: 1 tab(s) orally 3 times a day  Trelegy Ellipta 200 mcg-62.5 mcg-25 mcg/inh inhalation powder: 1 puff(s) inhaled once a day

## 2023-01-01 ENCOUNTER — NON-APPOINTMENT (OUTPATIENT)
Age: 88
End: 2023-01-01
